# Patient Record
Sex: FEMALE | Race: WHITE | NOT HISPANIC OR LATINO | Employment: FULL TIME | URBAN - METROPOLITAN AREA
[De-identification: names, ages, dates, MRNs, and addresses within clinical notes are randomized per-mention and may not be internally consistent; named-entity substitution may affect disease eponyms.]

---

## 2017-03-27 ENCOUNTER — TRANSFERRED RECORDS (OUTPATIENT)
Dept: HEALTH INFORMATION MANAGEMENT | Facility: CLINIC | Age: 27
End: 2017-03-27

## 2017-03-27 LAB
CHOLEST SERPL-MCNC: 144 MG/DL (ref 100–199)
HBA1C MFR BLD: 5.7 % (ref 0–5.7)
HDLC SERPL-MCNC: 29 MG/DL
HPV ABSTRACT: NORMAL
LDLC SERPL CALC-MCNC: 90 MG/DL
NONHDLC SERPL-MCNC: 115 MG/DL
PAP-ABSTRACT: NORMAL
TRIGL SERPL-MCNC: 126 MG/DL

## 2018-02-21 ENCOUNTER — TELEPHONE (OUTPATIENT)
Dept: PEDIATRICS | Facility: CLINIC | Age: 28
End: 2018-02-21

## 2018-02-21 ENCOUNTER — HOSPITAL ENCOUNTER (EMERGENCY)
Facility: CLINIC | Age: 28
Discharge: HOME | End: 2018-02-22
Attending: EMERGENCY MEDICINE | Admitting: EMERGENCY MEDICINE
Payer: COMMERCIAL

## 2018-02-21 ENCOUNTER — APPOINTMENT (OUTPATIENT)
Dept: CT IMAGING | Facility: CLINIC | Age: 28
End: 2018-02-21
Attending: EMERGENCY MEDICINE
Payer: COMMERCIAL

## 2018-02-21 ENCOUNTER — APPOINTMENT (OUTPATIENT)
Dept: ULTRASOUND IMAGING | Facility: CLINIC | Age: 28
End: 2018-02-21
Attending: EMERGENCY MEDICINE
Payer: COMMERCIAL

## 2018-02-21 DIAGNOSIS — R10.31 ABDOMINAL PAIN, RIGHT LOWER QUADRANT: ICD-10-CM

## 2018-02-21 LAB
ALBUMIN SERPL-MCNC: 3.6 G/DL (ref 3.4–5)
ALBUMIN UR-MCNC: NEGATIVE MG/DL
ALP SERPL-CCNC: 77 U/L (ref 40–150)
ALT SERPL W P-5'-P-CCNC: 40 U/L (ref 0–50)
ANION GAP SERPL CALCULATED.3IONS-SCNC: 5 MMOL/L (ref 3–14)
APPEARANCE UR: ABNORMAL
APTT PPP: 28 SEC (ref 22–37)
AST SERPL W P-5'-P-CCNC: 19 U/L (ref 0–45)
BASOPHILS # BLD AUTO: 0.1 10E9/L (ref 0–0.2)
BASOPHILS NFR BLD AUTO: 0.5 %
BILIRUB SERPL-MCNC: 0.3 MG/DL (ref 0.2–1.3)
BILIRUB UR QL STRIP: NEGATIVE
BUN SERPL-MCNC: 8 MG/DL (ref 7–30)
CALCIUM SERPL-MCNC: 9.1 MG/DL (ref 8.5–10.1)
CHLORIDE SERPL-SCNC: 108 MMOL/L (ref 94–109)
CO2 SERPL-SCNC: 26 MMOL/L (ref 20–32)
COLOR UR AUTO: YELLOW
CREAT SERPL-MCNC: 0.8 MG/DL (ref 0.52–1.04)
DIFFERENTIAL METHOD BLD: NORMAL
EOSINOPHIL # BLD AUTO: 0.2 10E9/L (ref 0–0.7)
EOSINOPHIL NFR BLD AUTO: 1.8 %
ERYTHROCYTE [DISTWIDTH] IN BLOOD BY AUTOMATED COUNT: 13.8 % (ref 10–15)
GFR SERPL CREATININE-BSD FRML MDRD: 84 ML/MIN/1.7M2
GLUCOSE SERPL-MCNC: 97 MG/DL (ref 70–99)
GLUCOSE UR STRIP-MCNC: NEGATIVE MG/DL
HCG SERPL QL: NEGATIVE
HCT VFR BLD AUTO: 43.3 % (ref 35–47)
HGB BLD-MCNC: 13.9 G/DL (ref 11.7–15.7)
HGB UR QL STRIP: ABNORMAL
IMM GRANULOCYTES # BLD: 0.1 10E9/L (ref 0–0.4)
IMM GRANULOCYTES NFR BLD: 0.7 %
INR PPP: 1 (ref 0.86–1.14)
KETONES UR STRIP-MCNC: NEGATIVE MG/DL
LEUKOCYTE ESTERASE UR QL STRIP: ABNORMAL
LIPASE SERPL-CCNC: 151 U/L (ref 73–393)
LYMPHOCYTES # BLD AUTO: 2.9 10E9/L (ref 0.8–5.3)
LYMPHOCYTES NFR BLD AUTO: 26.5 %
MCH RBC QN AUTO: 28.4 PG (ref 26.5–33)
MCHC RBC AUTO-ENTMCNC: 32.1 G/DL (ref 31.5–36.5)
MCV RBC AUTO: 88 FL (ref 78–100)
MONOCYTES # BLD AUTO: 0.8 10E9/L (ref 0–1.3)
MONOCYTES NFR BLD AUTO: 7 %
MUCOUS THREADS #/AREA URNS LPF: PRESENT /LPF
NEUTROPHILS # BLD AUTO: 6.8 10E9/L (ref 1.6–8.3)
NEUTROPHILS NFR BLD AUTO: 63.5 %
NITRATE UR QL: NEGATIVE
NRBC # BLD AUTO: 0 10*3/UL
NRBC BLD AUTO-RTO: 0 /100
PH UR STRIP: 5 PH (ref 5–7)
PLATELET # BLD AUTO: 308 10E9/L (ref 150–450)
POTASSIUM SERPL-SCNC: 3.9 MMOL/L (ref 3.4–5.3)
PROT SERPL-MCNC: 8.3 G/DL (ref 6.8–8.8)
RBC # BLD AUTO: 4.9 10E12/L (ref 3.8–5.2)
RBC #/AREA URNS AUTO: 3 /HPF (ref 0–2)
SODIUM SERPL-SCNC: 139 MMOL/L (ref 133–144)
SOURCE: ABNORMAL
SP GR UR STRIP: 1.02 (ref 1–1.03)
SQUAMOUS #/AREA URNS AUTO: 5 /HPF (ref 0–1)
UROBILINOGEN UR STRIP-MCNC: 0 MG/DL (ref 0–2)
WBC # BLD AUTO: 10.8 10E9/L (ref 4–11)
WBC #/AREA URNS AUTO: 5 /HPF (ref 0–2)

## 2018-02-21 PROCEDURE — 84703 CHORIONIC GONADOTROPIN ASSAY: CPT | Performed by: EMERGENCY MEDICINE

## 2018-02-21 PROCEDURE — 80053 COMPREHEN METABOLIC PANEL: CPT | Performed by: EMERGENCY MEDICINE

## 2018-02-21 PROCEDURE — 25000128 H RX IP 250 OP 636: Performed by: EMERGENCY MEDICINE

## 2018-02-21 PROCEDURE — 81001 URINALYSIS AUTO W/SCOPE: CPT | Performed by: EMERGENCY MEDICINE

## 2018-02-21 PROCEDURE — 96374 THER/PROPH/DIAG INJ IV PUSH: CPT | Mod: 59

## 2018-02-21 PROCEDURE — 99285 EMERGENCY DEPT VISIT HI MDM: CPT | Mod: 25

## 2018-02-21 PROCEDURE — 85730 THROMBOPLASTIN TIME PARTIAL: CPT | Performed by: EMERGENCY MEDICINE

## 2018-02-21 PROCEDURE — 96375 TX/PRO/DX INJ NEW DRUG ADDON: CPT

## 2018-02-21 PROCEDURE — 74177 CT ABD & PELVIS W/CONTRAST: CPT

## 2018-02-21 PROCEDURE — 96361 HYDRATE IV INFUSION ADD-ON: CPT

## 2018-02-21 PROCEDURE — 85025 COMPLETE CBC W/AUTO DIFF WBC: CPT | Performed by: EMERGENCY MEDICINE

## 2018-02-21 PROCEDURE — 93976 VASCULAR STUDY: CPT

## 2018-02-21 PROCEDURE — 85610 PROTHROMBIN TIME: CPT | Performed by: EMERGENCY MEDICINE

## 2018-02-21 PROCEDURE — 96376 TX/PRO/DX INJ SAME DRUG ADON: CPT

## 2018-02-21 PROCEDURE — 83690 ASSAY OF LIPASE: CPT | Performed by: EMERGENCY MEDICINE

## 2018-02-21 RX ORDER — ONDANSETRON 2 MG/ML
4 INJECTION INTRAMUSCULAR; INTRAVENOUS EVERY 30 MIN PRN
Status: DISCONTINUED | OUTPATIENT
Start: 2018-02-21 | End: 2018-02-22 | Stop reason: HOSPADM

## 2018-02-21 RX ORDER — IOPAMIDOL 755 MG/ML
500 INJECTION, SOLUTION INTRAVASCULAR ONCE
Status: COMPLETED | OUTPATIENT
Start: 2018-02-21 | End: 2018-02-21

## 2018-02-21 RX ORDER — MORPHINE SULFATE 4 MG/ML
4 INJECTION, SOLUTION INTRAMUSCULAR; INTRAVENOUS
Status: DISCONTINUED | OUTPATIENT
Start: 2018-02-21 | End: 2018-02-22 | Stop reason: HOSPADM

## 2018-02-21 RX ADMIN — ONDANSETRON 4 MG: 2 INJECTION INTRAMUSCULAR; INTRAVENOUS at 21:53

## 2018-02-21 RX ADMIN — SODIUM CHLORIDE 1000 ML: 9 INJECTION, SOLUTION INTRAVENOUS at 22:36

## 2018-02-21 RX ADMIN — IOPAMIDOL 100 ML: 755 INJECTION, SOLUTION INTRAVENOUS at 22:21

## 2018-02-21 RX ADMIN — SODIUM CHLORIDE 65 ML: 9 INJECTION, SOLUTION INTRAVENOUS at 22:22

## 2018-02-21 RX ADMIN — ONDANSETRON 4 MG: 2 INJECTION INTRAMUSCULAR; INTRAVENOUS at 22:35

## 2018-02-21 RX ADMIN — MORPHINE SULFATE 4 MG: 4 INJECTION INTRAVENOUS at 21:55

## 2018-02-21 NOTE — TELEPHONE ENCOUNTER
"Patient called regarding     Lower right quadrant and lower middle quadrant abdominal pain 6-7/10. Pain described as cramping and constant.     Patient reports she was seen at [facility] last night. Was given pain medication, completed a urine test, and blood work. Patient reports she did not hear anything back regarding lab work.    Patient stated pain has intensified from last night. Feels she needs to be in a crunched position to feel relief of pain. Reports nausea. Denies vomiting. Reported chills and night sweats last night. Denies fever. Has tried IBU this morning. Pain was somewhat relieved but still present. Patient reports she has not had a BM for 2 days, \"unsualy for me\". Reports no passing of gas. Reports LMP this month.     Triage advised to be seen today in ER for further eval as patient reports pain intensified.  OV scheduled with resident cancelled.     Pt expressed understanding and acceptance of the plan.  Pt had no further questions at this time.  Advised can call back to clinic at any time with concerns.     Kandace BHANDARI RN, BSN, PHN  [place] [facility] RN    "

## 2018-02-21 NOTE — ED AVS SNAPSHOT
[facility] Emergency Department    [address]    [facility] [address]    Phone:  [phone]    Fax:  [phone]                                       Olga Sheehan   MRN: [id number]    Department:  [facility] Emergency Department   Date of Visit:  2/21/2018           After Visit Summary Signature Page     I have received my discharge instructions, and my questions have been answered. I have discussed any challenges I see with this plan with the nurse or doctor.    ..........................................................................................................................................  Patient/Patient Representative Signature      ..........................................................................................................................................  Patient Representative Print Name and Relationship to Patient    ..................................................               ................................................  Date                                            Time    ..........................................................................................................................................  Reviewed by Signature/Title    ...................................................              ..............................................  Date                                                            Time

## 2018-02-21 NOTE — ED AVS SNAPSHOT
[facility] Emergency Department    [address] [place] [address]    Phone:  [phone]    Fax:  [phone]                                       Olga Sheehan   MRN: [id number]    Department:  [facility] Emergency Department   Date of Visit:  2/21/2018           Patient Information     Date Of Birth          1990        Your diagnoses for this visit were:     Abdominal pain, right lower quadrant        You were seen by Lee Escalera MD.      Follow-up Information     Follow up with Yanni Ac. Schedule an appointment as soon as possible for a visit in 2 days.    Why:  For close follow up    Contact information:    [address] [facility]  [place] [address]  [phone]          Discharge Instructions       Discharge Instructions  Abdominal Pain    Abdominal pain (belly pain) can be caused by many things. Your evaluation today does not show the exact cause for your pain. Your provider today has decided that it is unlikely your pain is due to a life threatening problem, or a problem requiring surgery or hospital admission. Sometimes those problems cannot be found right away, so it is very important that you follow up as directed.  Sometimes only the changes which occur over time allow the cause of your pain to be found.    Generally, every Emergency Department visit should have a follow-up clinic visit with either a primary or a specialty clinic/provider. Please follow-up as instructed by your emergency provider today. With abdominal pain, we often recommend very close follow-up, such as the following day.    ADULTS:  Return to the Emergency Department right away if:      You get an oral temperature above 102oF or as directed by your provider.    You have blood in your stools. This may be bright red or appear as black, tarry stools.      You keep vomiting (throwing up) or cannot drink liquids.    You see blood when you vomit.     You cannot  have a bowel movement or you cannot pass gas.    Your stomach gets bloated or bigger.    Your skin or the whites of your eyes look yellow.    You faint.    You have bloody, frequent or painful urination (peeing).    You have new symptoms or anything that worries you.    CHILDREN:  Return to the Emergency Department right away if your child has any of the above-listed symptoms or the following:      Pushes your hand away or screams/cries when his/her belly is touched.    You notice your child is very fussy or weak.    Your child is very tired and is too tired to eat or drink.    Your child is dehydrated.  Signs of dehydration can be:  o Significant change in the amount of wet diapers/urine.  o Your infant or child starts to have dry mouth and lips, or no saliva (spit) or tears.    PREGNANT WOMEN:  Return to the Emergency Department right away if you have any of the above-listed symptoms or the following:      You have bleeding, leaking fluid or passing tissue from the vagina.    You have worse pain or cramping, or pain in your shoulder or back.    You have vomiting that will not stop.    You have a temperature of 100oF or more.    Your baby is not moving as much as usual.    You faint.    You get a bad headache with or without eye problems and abdominal pain.    You have a seizure.    You have unusual discharge from your vagina and abdominal pain.    Abdominal pain is pretty common during pregnancy.  Your pain may or may not be related to your pregnancy. You should follow-up closely with your OB provider so they can evaluate you and your baby.  Until you follow-up with your regular provider, do the following:       Avoid sex and do not put anything in your vagina.    Drink clear fluids.    Only take medications approved by your provider.    MORE INFORMATION:    Appendicitis:  A possible cause of abdominal pain in any person who still has their appendix is acute appendicitis. Appendicitis is often hard to diagnose.   "Testing does not always rule out early appendicitis or other causes of abdominal pain. Close follow-up with your provider and re-evaluations may be needed to figure out the reason for your abdominal pain.    Follow-up:  It is very important that you make an appointment with your clinic and go to the appointment.  If you do not follow-up with your primary provider, it may result in missing an important development which could result in permanent injury or disability and/or lasting pain.  If there is any problem keeping your appointment, call your provider or return to the Emergency Department.    Medications:  Take your medications as directed by your provider today.  Before using over-the-counter medications, ask your provider and make sure to take the medications as directed.  If you have any questions about medications, ask your provider.    Diet:  Resume your normal diet as much as possible, but do not eat fried, fatty or spicy foods while you have pain.  Do not drink alcohol or have caffeine.  Do not smoke tobacco.    Probiotics: If you have been given an antibiotic, you may want to also take a probiotic pill or eat yogurt with live cultures. Probiotics have \"good bacteria\" to help your intestines stay healthy. Studies have shown that probiotics help prevent diarrhea (loose stools) and other intestine problems (including C. diff infection) when you take antibiotics. You can buy these without a prescription in the pharmacy section of the store.     If you were given a prescription for medicine here today, be sure to read all of the information (including the package insert) that comes with your prescription.  This will include important information about the medicine, its side effects, and any warnings that you need to know about.  The pharmacist who fills the prescription can provide more information and answer questions you may have about the medicine.  If you have questions or concerns that the pharmacist " cannot address, please call or return to the Emergency Department.       Remember that you can always come back to the Emergency Department if you are not able to see your regular provider in the amount of time listed above, if you get any new symptoms, or if there is anything that worries you.      24 Hour Appointment Hotline       To make an appointment at any [facility], call [phone] ([phone]). If you don't have a family doctor or clinic, we will help you find one. [facility] are conveniently located to serve the needs of you and your family.             Review of your medicines      START taking        Dose / Directions Last dose taken    acetaminophen-codeine 300-30 MG per tablet   Commonly known as:  TYLENOL WITH CODEINE #3   Dose:  1-2 tablet   Quantity:  20 tablet        Take 1-2 tablets by mouth every 6 hours as needed for pain   Refills:  0        ibuprofen 600 MG tablet   Commonly known as:  ADVIL/MOTRIN   Dose:  600 mg   Quantity:  30 tablet        Take 1 tablet (600 mg) by mouth every 8 hours as needed for moderate pain   Refills:  0        polyethylene glycol powder   Commonly known as:  MIRALAX   Dose:  1 capful   Quantity:  510 g        Take 17 g (1 capful) by mouth daily Mix with 8 ounces of water or other liquid   Refills:  1          Our records show that you are taking the medicines listed below. If these are incorrect, please call your family doctor or clinic.        Dose / Directions Last dose taken    ALBUTEROL INHALER 17GM        None Entered   Refills:  0        ATIVAN PO        prn   Refills:  0        ondansetron 4 MG ODT tab   Commonly known as:  ZOFRAN ODT   Dose:  4 mg   Quantity:  10 tablet        Take 1 tablet by mouth every 6 hours as needed for nausea.   Refills:  0        ondansetron 4 MG tablet   Commonly known as:  ZOFRAN   Dose:  4 mg   Quantity:  6 tablet        Take 1 tablet by mouth every 8 hours as needed for nausea.   Refills:  0                 Prescriptions were sent or printed at these locations (3 Prescriptions)                   Other Prescriptions                Printed at Department/Unit printer (3 of 3)         polyethylene glycol (MIRALAX) powder               ibuprofen (ADVIL/MOTRIN) 600 MG tablet               acetaminophen-codeine (TYLENOL WITH CODEINE #3) 300-30 MG per tablet                Procedures and tests performed during your visit     CBC with platelets differential    CT Abdomen Pelvis w Contrast    Comprehensive metabolic panel    Give 20 ounces of water 15 minutes before CT of abdomen    HCG qualitative Blood    INR    Lipase    Partial thromboplastin time    Peripheral IV: Standard    UA reflex to Microscopic and Culture    US Pelvic Complete w Transvaginal & Abd/Pel Duplex Limited      Orders Needing Specimen Collection     None      Pending Results     Date and Time Order Name Status Description    2/21/2018 2236 US Pelvic Complete w Transvaginal & Abd/Pel Duplex Limited Preliminary             Pending Culture Results     No orders found for last 3 day(s).            Pending Results Instructions     If you had any lab results that were not finalized at the time of your Discharge, you can call the ED Lab Result RN at [phone]. You will be contacted by this team for any positive Lab results or changes in treatment. The nurses are available 7 days a week from 10A to 6:30P.  You can leave a message 24 hours per day and they will return your call.        Test Results From Your Hospital Stay        2/21/2018  9:49 PM      Component Results     Component Value Ref Range & Units Status    WBC 10.8 4.0 - 11.0 10e9/L Final    RBC Count 4.90 3.8 - 5.2 10e12/L Final    Hemoglobin 13.9 11.7 - 15.7 g/dL Final    Hematocrit 43.3 35.0 - 47.0 % Final    MCV 88 78 - 100 fl Final    MCH 28.4 26.5 - 33.0 pg Final    MCHC 32.1 31.5 - 36.5 g/dL Final    RDW 13.8 10.0 - 15.0 % Final    Platelet Count 308 150 - 450 10e9/L Final    Diff Method  Automated Method  Final    % Neutrophils 63.5 % Final    % Lymphocytes 26.5 % Final    % Monocytes 7.0 % Final    % Eosinophils 1.8 % Final    % Basophils 0.5 % Final    % Immature Granulocytes 0.7 % Final    Nucleated RBCs 0 0 /100 Final    Absolute Neutrophil 6.8 1.6 - 8.3 10e9/L Final    Absolute Lymphocytes 2.9 0.8 - 5.3 10e9/L Final    Absolute Monocytes 0.8 0.0 - 1.3 10e9/L Final    Absolute Eosinophils 0.2 0.0 - 0.7 10e9/L Final    Absolute Basophils 0.1 0.0 - 0.2 10e9/L Final    Abs Immature Granulocytes 0.1 0 - 0.4 10e9/L Final    Absolute Nucleated RBC 0.0  Final         2/21/2018 10:01 PM      Component Results     Component Value Ref Range & Units Status    INR 1.00 0.86 - 1.14 Final         2/21/2018 10:01 PM      Component Results     Component Value Ref Range & Units Status    PTT 28 22 - 37 sec Final         2/21/2018 10:07 PM      Component Results     Component Value Ref Range & Units Status    Sodium 139 133 - 144 mmol/L Final    Potassium 3.9 3.4 - 5.3 mmol/L Final    Chloride 108 94 - 109 mmol/L Final    Carbon Dioxide 26 20 - 32 mmol/L Final    Anion Gap 5 3 - 14 mmol/L Final    Glucose 97 70 - 99 mg/dL Final    Urea Nitrogen 8 7 - 30 mg/dL Final    Creatinine 0.80 0.52 - 1.04 mg/dL Final    GFR Estimate 84 >60 mL/min/1.7m2 Final    Non  GFR Calc    GFR Estimate If Black >90 >60 mL/min/1.7m2 Final    African American GFR Calc    Calcium 9.1 8.5 - 10.1 mg/dL Final    Bilirubin Total 0.3 0.2 - 1.3 mg/dL Final    Albumin 3.6 3.4 - 5.0 g/dL Final    Protein Total 8.3 6.8 - 8.8 g/dL Final    Alkaline Phosphatase 77 40 - 150 U/L Final    ALT 40 0 - 50 U/L Final    AST 19 0 - 45 U/L Final         2/21/2018 10:07 PM      Component Results     Component Value Ref Range & Units Status    Lipase 151 73 - 393 U/L Final         2/21/2018 10:12 PM      Component Results     Component Value Ref Range & Units Status    HCG Qualitative Serum Negative NEG^Negative Final    This test is for  screening purposes.  Results should be interpreted along with   the clinical picture.  Confirmation testing is available if warranted by   ordering [id number], HCG Quantitative Pregnancy.           2/21/2018 10:32 PM      Component Results     Component Value Ref Range & Units Status    Color Urine Yellow  Final    Appearance Urine Slightly Cloudy  Final    Glucose Urine Negative NEG^Negative mg/dL Final    Bilirubin Urine Negative NEG^Negative Final    Ketones Urine Negative NEG^Negative mg/dL Final    Specific Gravity Urine 1.018 1.003 - 1.035 Final    Blood Urine Small (A) NEG^Negative Final    pH Urine 5.0 5.0 - 7.0 pH Final    Protein Albumin Urine Negative NEG^Negative mg/dL Final    Urobilinogen mg/dL 0.0 0.0 - 2.0 mg/dL Final    Nitrite Urine Negative NEG^Negative Final    Leukocyte Esterase Urine Trace (A) NEG^Negative Final    Source Midstream Urine  Final    RBC Urine 3 (H) 0 - 2 /HPF Final    WBC Urine 5 (H) 0 - 2 /HPF Final    Squamous Epithelial /HPF Urine 5 (H) 0 - 1 /HPF Final    Mucous Urine Present (A) NEG^Negative /LPF Final         2/21/2018 10:31 PM      Narrative     CT ABDOMEN PELVIS W CONTRAST 2/21/2018 10:27 PM    HISTORY: Abdominal pain.    TECHNIQUE: CT of the abdomen and pelvis is performed with 100mL  Isovue-370 intravenously. Radiation dose for this scan was reduced  using automated exposure control, adjustment of the mA and/or kV  according to patient size, or iterative reconstruction technique.    COMPARISON: None.    FINDINGS:    Liver: There is diffuse fatty infiltration of the liver. Hepatomegaly.  Gallbladder: Cholelithiasis.  Pancreas:Normal.  Spleen:Normal.  Adrenals:Normal.  Ascites:None.    Kidneys:Normal.  Bladder:Normal.  Pelvic free fluid:None.    Bowels:Unremarkable.  No evidence of obstruction.  Appendix:Normal.    Abdominal or pelvic lymphadenopathy:None.    Miscellaneous findings:None.        Impression     IMPRESSION:    1. Hepatomegaly, with diffuse fatty infiltration of  the liver.  2. Cholelithiasis.  3. No evidence of appendicitis.    CHRISTIANA MONDRAGON MD         2/21/2018 11:56 PM      Narrative     PELVIC ULTRASOUND WITH ENDOVAGINAL TRANSDUCER   2/21/2018 11:48 PM     HISTORY: Right lower quadrant pain, evaluate for torsion or ovarian  pathology.     TECHNIQUE:  Endovaginal sonography was added to the transabdominal  exam to better evaluate the uterus and ovaries.    COMPARISON: None.    FINDINGS: The uterus is normal in size and position measuring 7.4 x  4.4 x 4.1 cm. The endometrium is normal in thickness at 1.0 cm.  Neither ovary is identified. No adnexal mass. No free pelvic fluid.        Impression     IMPRESSION: Normal uterus. Neither ovary is identified.                Clinical Quality Measure: Blood Pressure Screening     Your blood pressure was checked while you were in the emergency department today. The last reading we obtained was  BP: (!) 163/95 . Please read the guidelines below about what these numbers mean and what you should do about them.  If your systolic blood pressure (the top number) is less than 120 and your diastolic blood pressure (the bottom number) is less than 80, then your blood pressure is normal. There is nothing more that you need to do about it.  If your systolic blood pressure (the top number) is 120-139 or your diastolic blood pressure (the bottom number) is 80-89, your blood pressure may be higher than it should be. You should have your blood pressure rechecked within a year by a primary care provider.  If your systolic blood pressure (the top number) is 140 or greater or your diastolic blood pressure (the bottom number) is 90 or greater, you may have high blood pressure. High blood pressure is treatable, but if left untreated over time it can put you at risk for heart attack, stroke, or kidney failure. You should have your blood pressure rechecked by a primary care provider within the next 4 weeks.  If your provider in the emergency department  "today gave you specific instructions to follow-up with your doctor or provider even sooner than that, you should follow that instruction and not wait for up to 4 weeks for your follow-up visit.        Thank you for choosing [facility]       Thank you for choosing [facility] for your care. Our goal is always to provide you with excellent care. Hearing back from our patients is one way we can continue to improve our services. Please take a few minutes to complete the written survey that you may receive in the mail after you visit with us. Thank you!        [employer] Information     [employer] lets you send messages to your doctor, view your test results, renew your prescriptions, schedule appointments and more. To sign up, go to [email or web address] . Click on \"Log in\" on the left side of the screen, which will take you to the Welcome page. Then click on \"Sign up Now\" on the right side of the page.     You will be asked to enter the access code listed below, as well as some personal information. Please follow the directions to create your username and password.     Your access code is: [email or web address]  Expires: 2018 12:02 AM     Your access code will  in 90 days. If you need help or a new code, please call your [facility] clinic or [phone].        Care EveryWhere ID     This is your Care EveryWhere ID. This could be used by other organizations to access your [facility] medical records  [id number]        Equal Access to Services     BYRON MEDRANO : Hadii kadi Ruiz, waaxda zena, qaybta kaalmasylvester madsen, zaire singh . So [facility] [phone].    ATENCIÓN: Si habla español, tiene a tolbert disposición servicios gratuitos de asistencia lingüística. Lilaame al [phone].    We comply with applicable federal civil rights laws and [place] laws. We do not discriminate on the basis of race, color, national origin, age, disability, sex, sexual orientation, or gender identity.       "      After Visit Summary       This is your record. Keep this with you and show to your community pharmacist(s) and doctor(s) at your next visit.

## 2018-02-22 VITALS
SYSTOLIC BLOOD PRESSURE: 134 MMHG | DIASTOLIC BLOOD PRESSURE: 82 MMHG | OXYGEN SATURATION: 98 % | TEMPERATURE: 97.9 F | RESPIRATION RATE: 16 BRPM

## 2018-02-22 RX ORDER — IBUPROFEN 600 MG/1
600 TABLET, FILM COATED ORAL EVERY 8 HOURS PRN
Qty: 30 TABLET | Refills: 0 | Status: SHIPPED | OUTPATIENT
Start: 2018-02-22 | End: 2019-03-25

## 2018-02-22 RX ORDER — ACETAMINOPHEN AND CODEINE PHOSPHATE 300; 30 MG/1; MG/1
1-2 TABLET ORAL EVERY 6 HOURS PRN
Qty: 20 TABLET | Refills: 0 | Status: ON HOLD | OUTPATIENT
Start: 2018-02-22 | End: 2018-06-28

## 2018-02-22 RX ORDER — POLYETHYLENE GLYCOL 3350 17 G/17G
1 POWDER, FOR SOLUTION ORAL DAILY
Qty: 510 G | Refills: 1 | Status: SHIPPED | OUTPATIENT
Start: 2018-02-21 | End: 2019-09-10

## 2018-02-22 ASSESSMENT — ENCOUNTER SYMPTOMS
CONSTIPATION: 1
NAUSEA: 1
FEVER: 1
ABDOMINAL PAIN: 1
VOMITING: 1

## 2018-02-22 NOTE — ED PROVIDER NOTES
History     Chief Complaint:  Abdominal Pain     HPI   Olga Sheehan is a 28 year old female who presents with abdominal pain. The patient states that she had an onset of right lower quadrant abdominal pain yesterday which prompted her to visit the [facility]. There, they had performed an ultrasound as well as urine and blood labs. See below for more detail. She was told to follow up in the ED if she develops vomiting. This morning, the patient states that she had began to have emesis episodes this morning. In the afternoon, she notes that she has had menstrual type cramping in addition to the abdominal pain, but notes she is not currently on her menstrual period or having irregular bleeding. Here in the ED, the patient states that she has increased RLQ abdominal pain worse with movement. She additionally notes that she has had ongoing fever, highest of 101. She notes that she has been taking ibuprofen. She also notes decrease in bowel movements. Denies diarrhea, BRBPR, melena. She denies urinary symptoms. Pain does not radiate to her back or flank. She denies pelvic pain or vaginal symptoms.     Ultrasound conclusion from [facility] 2/20/18:  1. Normal gallbladder. No bile duct dilatation.  2. Diffuse hepatic steatosis.    Laboratory studies from [facility] 2/20/2018:  BASIC METABOLIC PANEL (02/20/2018 6:15 PM)  Component Value   SODIUM 145   POTASSIUM 3.6   CHLORIDE 107   CO2,TOTAL 24   ANION GAP 14 (H)   GLUCOSE,RANDOM 125 (H)   CALCIUM 9.1   BUN 9   CREATININE 0.80   BUN/CREAT RATIO 11   GFR if  >60   GFR if not  >60     HEPATIC FUNCTION PANEL (02/20/2018 6:15 PM)  Component Value   ALBUMIN 4.6   PROTEIN,TOTAL 8.4 (H)   GLOBULIN 3.8 (H)   A/G RATIO 1.2 (L)   BILIRUBIN,TOTAL 0.4   ALK PHOSPHATASE 75   ALT (SGPT) 42   AST (SGOT) 25     CBC WITH AUTO DIFFERENTIAL (02/20/2018 6:15 PM)  Component Value   WHITE BLOOD COUNT  10.8 (H)   RED BLOOD COUNT  5.01   HEMOGLOBIN  14.1    HEMATOCRIT  43.4   MCV  87   MCH  28.1   MCHC  32.5   RDW  13.4   PLATELET COUNT  282   MPV  8.9   NEUTROPHILS  66.5   LYMPHOCYTES  25.5   MONOCYTES  5.3   EOSINOPHILS  1.8   BASOPHILS  0.9   ABSOLUTE NEUTROPHILS  7.2 (H)   ABSOLUTE LYMPHOCYTES  2.7   ABSOLUTE MONOCYTES  0.6   ABSOLUTE EOSINOPHILS  0.2       Allergies:  Ceclor [Cefaclor]  Penicillins      Medications:    Zofran  Ativan  Albuterol    Past Medical History:    Asthma    Past Surgical History:    The patient does not have any pertinent past surgical history.     Family History:    No past pertinent family history.     Social History:  Presents with her significant other.   Current Every Day Smoker   Positive for alcohol use.    Marital Status:  Single [1]     Review of Systems   Constitutional: Positive for fever.   Gastrointestinal: Positive for abdominal pain, constipation, nausea and vomiting.   Genitourinary: Negative for vaginal bleeding.   All other systems reviewed and are negative.      Physical Exam   First Vitals:  BP: (!) 163/95  Heart Rate: 95  Temp: 97.9  F (36.6  C)  Resp: 16  SpO2: 100 %    Physical Exam  General: Well appearing, nontoxic. Resting comfortably  Head:  Scalp, face, and head appear normal  Eyes:  Pupils are equal, round    Conjunctivae non-injected and sclerae white  ENT:    The external nose is normal    Pinnae are normal    The oropharynx is normal, mucous membranes moist    Uvula is in the midline  Neck:  Normal range of motion    There is no rigidity noted    Trachea is in the midline  CV:  Regular rate and rhythm     Normal S1/S2, no S3/S4    No murmur or rub  Resp:  Lungs are clear and equal bilaterally    There is no tachypnea    No increased work of breathing    No rales, wheezing, or rhonchi  GI:  Abdomen is soft, obese, no rigidity or guarding    No distension, or mass    Significant RLQ tenderness. + Rebound tenderness. + Rosvings sign. No CVA tenderness   MS:  Normal muscular tone    Symmetric motor  strength    No lower extremity edema  Skin:  No rash or acute skin lesions noted  Neuro:  Awake and alert    Speech is normal and fluent    Moves all extremities spontaneously  Psych: Normal affect.  Appropriate interactions.    Emergency Department Course     Imaging:  Radiographic findings were communicated with the patient who voiced understanding of the findings.  CT Abdomen/Pelvis with IV contrast:   1. Hepatomegaly, with diffuse fatty infiltration of the liver.  2. Cholelithiasis.  3. No evidence of appendicitis. As per radiology.    US Pelvic Complete, with Transvaginal and Abdomen/Pelvis Duplex Limited:  Normal uterus. Neither ovary is identified. As per radiology.     Laboratory:  CBC: WBC: 10.8, HGB: 13.9, PLT: 308  CMP: all WNL (Creatinine: 0.80)    INR: 1.0  Partial Thromboplastin: 28  Lipase: 151  HCG qualitative: negative   UA with micro: small blood, trace leukocyte esterase, mucous present, RBC: 3 (H), WBC: 5 (H), squamous epithelial: 5 (H)  o/w negative     Interventions:   2153 Zofran, 4 mg, IV  2155 Morphine, 4 mg, IV  2235 Zofran, 4 mg, IV  2236 0.9% Sodium Chloride Bolus, 1L, IV      Emergency Department Course:  Nursing notes and vitals reviewed.     2118  I performed an exam of the patient as documented above.     IV inserted. Medicine administered as documented above. Blood drawn. This was sent to the lab for further testing, results above. The patient provided a urine sample here in the emergency department. This was sent for laboratory testing, findings above.      The patient was sent for a rib and chest XR while in the emergency department, findings above.     2238 I rechecked the patient and discussed the results of her workup thus far.     Findings and plan explained to the Patient. Patient discharged home with instructions regarding supportive care, medications, and reasons to return. The importance of close follow-up was reviewed. The patient was prescribed Miralax, Tylenol,  ibuprofen.     I personally reviewed the laboratory results with the Patient and answered all related questions prior to discharge.       Impression & Plan      Medical Decision Making:  Olga Sheehan is a 28 year old female who presents with worsening abdominal pain and was previously seen at the [facility] where she had a negative right upper quadrant US and unremarkable laboratory studies as noted above. Her pain has worsened and localized to the right lower quadrant. The differential diagnosis is broad and includes appendicitis, intraabdominal abscess, colitis, bowel obstruction, volvulus. There was no evidence of cholelithiasis or cholecystitis on her US results reviewed from [facility]. She does have evidence of hepatic steatosis, but I feel it is unlikely to cause her current symptoms. She also notes decrease in bowel movements. Constipation is on differential as well. Also considered was pelvic or gynecologic causes for her pain. Work up in the emergency department revealed normal laboratory studies including lipase which makes pancreatitis less likely. CT of the abdomen and pelvis was unremarkable. Appendix was unremarkable. There is no evidence of any other acute, serious etiology for her symptoms. Transvaginal and Pelvic US was then obtained and revealed no acute findings although the exam was limited due to the patient's body habitus and the ovaries were not definitely seen. On reevaluation multiple times during her ED course, her symptoms significantly improved after the above interventions. Clinically, I feel that acute ovarian torsion is less likely although I did discuss with the patient that we could not definitively rule this out given the difficulty visualizing the ovaries. Despite this and given that her symptoms are essentially resolved, I feel it is safe to have the patient follow up as an out patient. She has an appointment to see her PCP tomorrow. I will provide the patient with  Miralax, Tylenol #3, as well as ibuprofen to use as needed for pain. Return precautions were discussed with patient. The patient's questions were answered and the patient was agreeable with discharge. Patient was discharged in improved condition.       Diagnosis:    ICD-10-CM   1. Abdominal pain, right lower quadrant R10.31       Disposition:  discharged to home    Discharge Medications:   Details   polyethylene glycol (MIRALAX) powder Take 17 g (1 capful) by mouth daily Mix with 8 ounces of water or other liquid, Disp-510 g, R-1, Local Print      ibuprofen (ADVIL/MOTRIN) 600 MG tablet Take 1 tablet (600 mg) by mouth every 8 hours as needed for moderate pain, Disp-30 tablet, R-0, Local Print      acetaminophen-codeine (TYLENOL WITH CODEINE #3) 300-30 MG per tablet Take 1-2 tablets by mouth every 6 hours as needed for pain, Disp-20 tablet, R-0, Local Print     IDenita, am serving as a scribe on 2/21/2018 at 9:18 PM to personally document services performed by Lee Escalera MD based on my observations and the provider's statements to me.      Denita Ryder  2/21/2018   [facility] EMERGENCY DEPARTMENT       Lee Escalera MD  02/22/18 [id number]

## 2018-02-22 NOTE — ED NOTES
Middle abdomen pain that started yesterday. Seen at the [facility]; told to follow up if fever, chills or vomiting. No BM in two days. Not passing gas. Lower abdominal cramping.

## 2018-03-01 PROBLEM — J45.20 INTERMITTENT ASTHMA: Status: ACTIVE | Noted: 2018-03-01

## 2018-03-01 PROBLEM — J45.909 ASTHMA: Status: ACTIVE | Noted: 2018-03-01

## 2018-03-01 PROBLEM — E66.9 OBESITY: Status: ACTIVE | Noted: 2018-03-01

## 2018-06-27 ENCOUNTER — HOSPITAL ENCOUNTER (EMERGENCY)
Facility: CLINIC | Age: 28
Discharge: HOME | End: 2018-06-27
Attending: EMERGENCY MEDICINE | Admitting: EMERGENCY MEDICINE
Payer: COMMERCIAL

## 2018-06-27 VITALS
WEIGHT: 293 LBS | RESPIRATION RATE: 20 BRPM | BODY MASS INDEX: 44.41 KG/M2 | OXYGEN SATURATION: 100 % | HEART RATE: 119 BPM | TEMPERATURE: 98.7 F | SYSTOLIC BLOOD PRESSURE: 160 MMHG | HEIGHT: 68 IN | DIASTOLIC BLOOD PRESSURE: 104 MMHG

## 2018-06-27 DIAGNOSIS — K61.1 PERIRECTAL ABSCESS: ICD-10-CM

## 2018-06-27 PROCEDURE — 76857 US EXAM PELVIC LIMITED: CPT

## 2018-06-27 PROCEDURE — 46050 I&D PERIANAL ABSCESS SUPFC: CPT

## 2018-06-27 PROCEDURE — 99284 EMERGENCY DEPT VISIT MOD MDM: CPT | Mod: 25

## 2018-06-27 PROCEDURE — 25000132 ZZH RX MED GY IP 250 OP 250 PS 637: Performed by: EMERGENCY MEDICINE

## 2018-06-27 PROCEDURE — 25000128 H RX IP 250 OP 636: Performed by: EMERGENCY MEDICINE

## 2018-06-27 RX ORDER — DOXYCYCLINE 100 MG/1
100 CAPSULE ORAL 2 TIMES DAILY
Qty: 14 CAPSULE | Refills: 0 | Status: SHIPPED | OUTPATIENT
Start: 2018-06-27 | End: 2018-07-04

## 2018-06-27 RX ORDER — OXYCODONE HYDROCHLORIDE 5 MG/1
10 TABLET ORAL ONCE
Status: COMPLETED | OUTPATIENT
Start: 2018-06-27 | End: 2018-06-27

## 2018-06-27 RX ORDER — OXYCODONE HYDROCHLORIDE 5 MG/1
5-10 TABLET ORAL EVERY 6 HOURS PRN
Qty: 15 TABLET | Refills: 0 | Status: SHIPPED | OUTPATIENT
Start: 2018-06-27 | End: 2018-11-07

## 2018-06-27 RX ORDER — BUPIVACAINE HYDROCHLORIDE 5 MG/ML
INJECTION, SOLUTION PERINEURAL
Status: DISCONTINUED
Start: 2018-06-27 | End: 2018-06-27 | Stop reason: HOSPADM

## 2018-06-27 RX ADMIN — OXYCODONE HYDROCHLORIDE 10 MG: 5 TABLET ORAL at 14:48

## 2018-06-27 RX ADMIN — MIDAZOLAM 10 MG: 5 INJECTION INTRAMUSCULAR; INTRAVENOUS at 13:51

## 2018-06-27 ASSESSMENT — ENCOUNTER SYMPTOMS: DIARRHEA: 1

## 2018-06-27 NOTE — ED AVS SNAPSHOT
[facility]    [address] [place] [address]    Phone:  [phone]    Fax:  [phone]                                       Olga Sheehan   MRN: [id number]    Department:  [facility]   Date of Visit:  6/27/2018           Patient Information     Date Of Birth          1990        Your diagnoses for this visit were:     Perirectal abscess        You were seen by Adams Billings MD.        Discharge Instructions       Please make an appointment to follow up with [facility] [phone] in 2-3 days even if entirely better.      Discharge Instructions  Boils or Abscesses, MRSA Skin infections    You have been treated today for a skin boil or abscess. A boil is an infection under the skin that causes a painful pus filled lump. Boils start when bacteria infect a hair follicle, the place where a hair starts to grow.  The most common places that boils develop are on the face, neck, armpits, breasts, groin and buttocks. When they are small they can often be treated at home, but they can grow quickly, become very painful, and require medical attention.    Many of these infections are staph infections. Staph is a type of bacteria that commonly lives on skin. As many as 1 out of 3 people have staph that lives on their skin. Usually, it causes no problems, but if there is a cut or scrape, it can cause an infection. You have been treated today for an infection thought to be caused by MRSA, (pronounced  mursa ) which stands for methicillin resistant staph aureus. MRSA can be very difficult to treat. The antibiotics that were once used for skin infections do not work on MRSA, so alternative medications must be prescribed.    Return to the Emergency Department if:    Your redness, pain, or swelling gets a lot worse.    You are unable to get your antibiotics, or are vomiting them up or you can t take them.    You are  feeling more ill, weak or lightheaded.    You start to run a new fever (temperature >101).    Anything else about the infection worries or concerns you.  Treatment:    Incision and drainage (opening the boil with a scalpel to help the pus drain) or needle aspiration (removing pus with a syringe) is sometimes needed for larger abscesses. A wick or packing is sometimes put in the wound to encourage ongoing drainage of infection from the area.  Please leave it in place for as long as instructed by your care provider or until your follow up wound check in 48 hours.    Start your antibiotics right away, and take them as prescribed. Be sure to finish the whole prescription, even if you are better.    Apply a heating pad, warm packs, or warm water soaks to the infected area for 15 minutes at a time, at least 3 times a day. Do not use a heating pad on your feet or legs if you have diabetes. Do not sleep with a heating pad on, since this can cause burns or skin injury.    Raise the affected area above the level of your heart as much as possible in the first 1-2 days.    Pain medication - You may take a pain medication such as Tylenol  (acetaminophen), Advil  (ibuprofen), Nuprin  (ibuprofen) or Aleve  (naproxen).  If you have been given a narcotic such as Vicodin  (hydrocodone with acetaminophen), Percocet  (oxycodone with acetaminophen), or codeine, do not drive for four hours after you have taken it. If the narcotic contains Tylenol  (acetaminophen), do not take Tylenol  with it. All narcotics will cause constipation, so eat a high fiber diet.              Information about MRSA    How do you catch MRSA?    By touching a person who has MRSA on his or her skin.    By being nearby when a person with MRSA breathes, coughs, or sneezes.    By touching a table, handle or other surface that has the germ on it.    If the germ is on your skin and you cut yourself or have another injury, you can get infected.    How do I know if I  "have a MRSA infection? MRSA most commonly causes skin infections such as boils, red tender lumps that contain pus. Your physician may recognize MRSA from the appearance of your infection. Sometimes, your doctor may swab your skin or the drainage from a boil to test for MRSA or other bacteria.    Can MRSA be treated? Yes, certain medications are still effective in treating MRSA infections.  It is very important that you follow the directions exactly. Take ALL the pills you are given, even if you feel better before you finish the pills. If you do not take them all, the germ could come back even stronger and be harder to treat next time.  Is there any way to prevent MRSA?     Wash your hands frequently with soap and water.    Do not share towels, washcloths, razors or other personal care items.    Wipe down gym equipment before and after you use it.    If you develop a similar infection in the future, you can try to treat it at home:    Apply warm compresses to the affected area to promote drainage.    Wash hands frequently to prevent spread of infection.    Keep affected area covered to prevent spread of infection.    Never squeeze or pop boils.     When to seek medical attention for boils:    You develop a fever.    The area around the boil becomes red or red streaks develop.    Your pain becomes severe.    You develop swollen lymph nodes.    You have diabetes, a heart murmur, an immune disease like HIV or AIDS, you take corticosteroids for a medical condition, or you are on chemotherapy.    You develop a boil or abscess on your face, near your spine or near your rectal opening.  Probiotics: If you have been given an antibiotic, you may want to also take a probiotic pill or eat yogurt with live cultures. Probiotics have \"good bacteria\" to help your intestines stay healthy. Studies have shown that probiotics help prevent diarrhea and other intestine problems (including C. diff infection) when you take antibiotics. You " can buy these without a prescription in the pharmacy section of the store.   If you were given a prescription for medicine here today, be sure to read all of the information (including the package insert) that comes with your prescription.  This will include important information about the medicine, its side effects, and any warnings that you need to know about.  The pharmacist who fills the prescription can provide more information and answer questions you may have about the medicine.  If you have questions or concerns that the pharmacist cannot address, please call or return to the Emergency Department.   Opioid Medication Information    Pain medications are among the most commonly prescribed medicines, so we are including this information for all our patients. If you did not receive pain medication or get a prescription for pain medicine, you can ignore it.     You may have been given a prescription for an opioid (narcotic) pain medicine and/or have received a pain medicine while here in the Emergency Department. These medicines can make you drowsy or impaired. You must not drive, operate dangerous equipment, or engage in any other dangerous activities while taking these medications. If you drive while taking these medications, you could be arrested for DUI, or driving under the influence. Do not drink any alcohol while you are taking these medications.     Opioid pain medications can cause addiction. If you have a history of chemical dependency of any type, you are at a higher risk of becoming addicted to pain medications.  Only take these prescribed medications to treat your pain when all other options have been tried. Take it for as short a time and as few doses as possible. Store your pain pills in a secure place, as they are frequently stolen and provide a dangerous opportunity for children or visitors in your house to start abusing these powerful medications. We will not replace any lost or stolen medicine.   As soon as your pain is better, you should flush all your remaining medication.     Many prescription pain medications contain Tylenol  (acetaminophen), including Vicodin , Tylenol #3 , Norco , Lortab , and Percocet .  You should not take any extra pills of Tylenol  if you are using these prescription medications or you can get very sick.  Do not ever take more than 3000 mg of acetaminophen in any 24 hour period.    All opioids tend to cause constipation. Drink plenty of water and eat foods that have a lot of fiber, such as fruits, vegetables, prune juice, apple juice and high fiber cereal.  Take a laxative if you don t move your bowels at least every other day. Miralax , Milk of Magnesia, Colace , or Senna  can be used to keep you regular.      Remember that you can always come back to the Emergency Department if you are not able to see your regular doctor in the amount of time listed above, if you get any new symptoms, or if there is anything that worries you.        Perianal Abscess, Incision and Drainage  Glands near the anus can become blocked. This can lead to infection. If the infection cannot drain, a collection of pus called an abscess may form. Symptoms of an abscess include pain, itching, swelling, and fever.  Treatment of this infection has required an incision to drain the pus from the abscess. A gauze packing may have been put into the abscess opening. This should be removed within 1-2 days. if it falls out sooner, do not try to put it back in. Treatment with antibiotics may or may not be needed.  Healing of the wound will take about 1 to 2 weeks, depending on the size of the abscess.  Home care    The wound may drain for the first several days. Cover the wound with a clean dry bandage. Change the dressing if it becomes soaked with blood or pus, or soiled with feces.    If gauze packing was placed inside the abscess cavity, you may be told to remove it yourself. Do this only do it if the doctor told  you to. You may do this in the shower. Once the packing is removed, wash the area carefully once a day until the skin opening has closed.     Try sitz baths. Sit in a tub filled with about 6 inches of hot water for 15-30 minutes. (Test the water temperature before sitting down to ensure it will not burn you.) Repeat this twice a day until pain is relieved.    If you were prescribed antibiotics, take all of the medicine as prescribed. Continue it even if you start feeling better. All of the medicine should be finished unless your healthcare provider tells you to stop.    Unless a pain medicine has been prescribed, you may take an over-the-counter medicine, such as ibuprofen, for pain.    Passing stools may be painful. If so, ask your healthcare provider about using a stool-softener for a short time.  Follow-up care  Follow up with your healthcare provider as advised by our staff.  When to seek medical advice  Call your health care provider if any of the following occur:    Increasing pain, swelling, or redness    Pus continuing to drain from the wound 5 days after the incision    Fever of 100.4 F (38 C) or higher, or as directed by your healthcare provider  Date Last Reviewed: 6/22/2015 2000-2017 [employer]. [address] [place] [address]. All rights reserved. This information is not intended as a substitute for professional medical care. Always follow your healthcare professional's instructions.          24 Hour Appointment Hotline       To make an appointment at any [facility], call [phone] ([phone]). If you don't have a family doctor or clinic, we will help you find one. [facility] clinics are conveniently located to serve the needs of you and your family.             Review of your medicines      START taking        Dose / Directions Last dose taken    doxycycline 100 MG capsule   Commonly known as:  VIBRAMYCIN   Dose:  100 mg   Quantity:  14 capsule        Take 1  capsule (100 mg) by mouth 2 times daily for 7 days   Refills:  0        oxyCODONE IR 5 MG tablet   Commonly known as:  ROXICODONE   Dose:  5-10 mg   Quantity:  15 tablet        Take 1-2 tablets (5-10 mg) by mouth every 6 hours as needed for pain   Refills:  0          Our records show that you are taking the medicines listed below. If these are incorrect, please call your family doctor or clinic.        Dose / Directions Last dose taken    acetaminophen-codeine 300-30 MG per tablet   Commonly known as:  TYLENOL WITH CODEINE #3   Dose:  1-2 tablet   Quantity:  20 tablet        Take 1-2 tablets by mouth every 6 hours as needed for pain   Refills:  0        ALBUTEROL INHALER 17GM        None Entered   Refills:  0        ATIVAN PO        prn   Refills:  0        ibuprofen 600 MG tablet   Commonly known as:  ADVIL/MOTRIN   Dose:  600 mg   Quantity:  30 tablet        Take 1 tablet (600 mg) by mouth every 8 hours as needed for moderate pain   Refills:  0        ondansetron 4 MG ODT tab   Commonly known as:  ZOFRAN ODT   Dose:  4 mg   Quantity:  10 tablet        Take 1 tablet by mouth every 6 hours as needed for nausea.   Refills:  0        ondansetron 4 MG tablet   Commonly known as:  ZOFRAN   Dose:  4 mg   Quantity:  6 tablet        Take 1 tablet by mouth every 8 hours as needed for nausea.   Refills:  0        polyethylene glycol powder   Commonly known as:  MIRALAX   Dose:  1 capful   Quantity:  510 g        Take 17 g (1 capful) by mouth daily Mix with 8 ounces of water or other liquid   Refills:  1                Information about OPIOIDS     PRESCRIPTION OPIOIDS: WHAT YOU NEED TO KNOW   We gave you an opioid (narcotic) pain medicine. It is important to manage your pain, but opioids are not always the best choice. You should first try all the other options your care team gave you. Take this medicine for as short a time (and as few doses) as possible.     These medicines have risks:    DO NOT drive when on new or higher  doses of pain medicine. These medicines can affect your alertness and reaction times, and you could be arrested for driving under the influence (DUI). If you need to use opioids long-term, talk to your care team about driving.    DO NOT operate heave machinery    DO NOT do any other dangerous activities while taking these medicines.     DO NOT drink any alcohol while taking these medicines.      If the opioid prescribed includes acetaminophen, DO NOT take with any other medicines that contain acetaminophen. Read all labels carefully. Look for the word  acetaminophen  or  Tylenol.  Ask your pharmacist if you have questions or are unsure.    You can get addicted to pain medicines, especially if you have a history of addiction (chemical, alcohol or substance dependence). Talk to your care team about ways to reduce this risk.    Store your pills in a secure place, locked if possible. We will not replace any lost or stolen medicine. If you don t finish your medicine, please throw away (dispose) as directed by your pharmacist. The [place] [employer]ution [employer] Agency has more information about safe disposal: https://www.pca.[facility].[place]/living-green/managing-unwanted-medications.     All opioids tend to cause constipation. Drink plenty of water and eat foods that have a lot of fiber, such as fruits, vegetables, prune juice, apple juice and high-fiber cereal. Take a laxative (Miralax, milk of magnesia, Colace, Senna) if you don t move your bowels at least every other day.         Prescriptions were sent or printed at these locations (2 Prescriptions)                   Other Prescriptions                Printed at Department/Unit printer (2 of 2)         doxycycline (VIBRAMYCIN) 100 MG capsule               oxyCODONE IR (ROXICODONE) 5 MG tablet                Procedures and tests performed during your visit     POC US SOFT TISSUE    Pulse oximetry nursing    Suction      Orders Needing Specimen Collection     None      Pending  Results     Date and Time Order Name Status Description    6/27/2018 1323 POC US SOFT TISSUE In process             Pending Culture Results     No orders found from 6/25/2018 to 6/28/2018.            Pending Results Instructions     If you had any lab results that were not finalized at the time of your Discharge, you can call the ED Lab Result RN at [phone]. You will be contacted by this team for any positive Lab results or changes in treatment. The nurses are available 7 days a week from 10A to 6:30P.  You can leave a message 24 hours per day and they will return your call.        Test Results From Your Hospital Stay        6/27/2018  1:23 PM      Result not yet available     Exam Begun                Clinical Quality Measure: Blood Pressure Screening     Your blood pressure was checked while you were in the emergency department today. The last reading we obtained was  BP: (!) 171/93 . Please read the guidelines below about what these numbers mean and what you should do about them.  If your systolic blood pressure (the top number) is less than 120 and your diastolic blood pressure (the bottom number) is less than 80, then your blood pressure is normal. There is nothing more that you need to do about it.  If your systolic blood pressure (the top number) is 120-139 or your diastolic blood pressure (the bottom number) is 80-89, your blood pressure may be higher than it should be. You should have your blood pressure rechecked within a year by a primary care provider.  If your systolic blood pressure (the top number) is 140 or greater or your diastolic blood pressure (the bottom number) is 90 or greater, you may have high blood pressure. High blood pressure is treatable, but if left untreated over time it can put you at risk for heart attack, stroke, or kidney failure. You should have your blood pressure rechecked by a primary care provider within the next 4 weeks.  If your provider in the emergency department  "today gave you specific instructions to follow-up with your doctor or provider even sooner than that, you should follow that instruction and not wait for up to 4 weeks for your follow-up visit.        Thank you for choosing [facility]       Thank you for choosing [facility] for your care. Our goal is always to provide you with excellent care. Hearing back from our patients is one way we can continue to improve our services. Please take a few minutes to complete the written survey that you may receive in the mail after you visit with us. Thank you!        [employer] Information     [employer] lets you send messages to your doctor, view your test results, renew your prescriptions, schedule appointments and more. To sign up, go to [email or web address] . Click on \"Log in\" on the left side of the screen, which will take you to the Welcome page. Then click on \"Sign up Now\" on the right side of the page.     You will be asked to enter the access code listed below, as well as some personal information. Please follow the directions to create your username and password.     Your access code is: [email or web address]  Expires: 2018  3:15 PM     Your access code will  in 90 days. If you need help or a new code, please call your [facility] clinic or [phone].        Care EveryWhere ID     This is your Care EveryWhere ID. This could be used by other organizations to access your [facility] medical records  [id number]        Equal Access to Services     BYRON MEDRANO : Hadii kadi Ruiz, waaxda lususan, qaybta kaalmada cale, zaire singh . [facility] [phone].    ATENCIÓN: Si habla español, tiene a tolbert disposición servicios gratuitos de asistencia lingüística. Rafael carrion [phone].    We comply with applicable federal civil rights laws and [place] laws. We do not discriminate on the basis of race, color, national origin, age, disability, sex, sexual orientation, or gender identity.       "      After Visit Summary       This is your record. Keep this with you and show to your community pharmacist(s) and doctor(s) at your next visit.

## 2018-06-27 NOTE — ED NOTES
A/O. VSS.. Pt verbalized understanding of d/c instructions and ambulated to lobby steady gait.   Script rx adoxycycline and oxycodone given to pt at time of d/c

## 2018-06-27 NOTE — ED PROVIDER NOTES
"  History     Chief Complaint:  Rectal Pain      HPI   Olga Sheehan is a 28 year old female who presents with rectal pain. She states having intense diarrhea four days ago. The next day she began having internal and external rectal pain that has progressively worsened. She noted the pain is off centered and more on one side. The pain is constant and non-radiating. The patient denies any bloody discharge. She took laxatives as to not strain the area. She stated the pain woke her up from sleep at 0300 this morning and she is having difficulty sleeping due to it.     Allergies:  Ceclor   Penicillins     Medications:    Acetaminophen  Albuterol  Ativan  Ibuprofen  Zofran     Past Medical History:    Asthma  Fatty liver   Obesity   Depressed bipolar disorder  PTSD    Past Surgical History:    The patient does not have any pertinent past surgical history.    Family History:    The patient denies any relevant family medical history.    Social History:  Smoking Status: Daily  Smokeless Tobacco: No  Alcohol Use: Yes  Marital Status:  Single [1]      Review of Systems   Gastrointestinal: Positive for diarrhea.   All other systems reviewed and are negative.    Physical Exam   Vitals:  Patient Vitals for the past 24 hrs:   BP Temp Temp src Pulse Resp SpO2 Height Weight   06/27/18 1558 - - - - 20 - - -   06/27/18 1450 - - - - - 100 % - -   06/27/18 1449 - - - - - 98 % - -   06/27/18 1448 (!) 160/104 - - - - - - -   06/27/18 1400 (!) 171/93 - - - - 96 % - -   06/27/18 1350 161/75 - - - - 98 % - -   06/27/18 1345 - - - - - 99 % - -   06/27/18 1330 - - - - - 98 % - -   06/27/18 1140 162/85 98.7  F (37.1  C) Oral 119 18 99 % 1.727 m (5' 8\") (!) 163.3 kg (360 lb)         Physical Exam   Genitourinary:             Constitutional:  Pleasant, age appropriate female lying on her right side in obvious discomfort.  HEENT:    Oropharynx is moist  Eyes:    Conjunctiva normal  Neck:    Supple, no meningismus.     CV:     Regular rate and " rhythm.      No murmurs, rubs or gallops.  PULM:    Clear to auscultation bilateral.       No respiratory distress.      Good air exchange.  ABD:    Soft, non-distended.       No abdominal tenderness.     Bowel sounds normal.     No pulsatile masses.       No rebound, guarding or rigidity.     No CVA tenderness.   MSK:     No gross deformity to all four extremities.   LYMPH:   No cervical lymphadenopathy.  NEURO:   Alert.  Good muscular tone, no atrophy.   Skin:    Warm, dry and intact.    Psych:    Mood is good and affect is appropriate.      Emergency Department Course   Imaging:  Radiology findings were communicated with the patient who voiced understanding of the findings.  POC US Soft Tissue   FINDINGS: Cobblestoning of soft tissue: absent  Hypoechoic fluid (ie abscess) identified: present measuring 2 x 2 cm    INTERPRETATION:  The soft tissue and muscle layers were evaluated.     Findings indicate abscess    Final result by Adams Billings MD      Procedures:      Procedure: Incision and Drainage   LOCATIONS:  Perirectal abscess     ANESTHESIA:  Local field block using Marcaine 0.5% plain, total of 9 mLs     PREPARATION:  Cleansed with Betadine     PROCEDURE:  Area was incised with # 11 Blade ([place]) with a Single Straight incision.  Wound treatment included Deloculation, Purulent Drainage and Expression of Material.  Packing consisted of No Packing.  Appropriate dressing was applied to cover the area.    Patient Status:        Patient tolerated the procedure well. There were no complications.          .  Interventions:  1351 Versed 10 mg IV  1448 Oxycodone 10 oral     Emergency Department Course:  Nursing notes and vitals reviewed.  I performed an exam of the patient as documented above.     1340 I checked in with the patient.    1401 I rechecked with the patient.     The patient was sent for a POC US soft tissue while in the emergency department, results above.     I personally answered all  related questions prior to discharge.    Findings and plan explained to the Patient. Patient discharged home with instructions regarding supportive care, medications, and reasons to return. The importance of close follow-up was reviewed.   Impression & Plan      Medical Decision Makin-year-old female presented to the ED with progressively worsening perirectal pain.  Patient found to have a perirectal abscess which is confirmed by bedside ultrasound.  Patient underwent an incision and drainage with significant purulent drainage.  Patient is safe for discharge home with ongoing analgesics and antibiotics to cover for Staph and Strep including MRSA.  General wound care instructions given.  Close follow with primary care physician in 2-3 days and return to ED for any worsening symptoms..     Diagnosis:    ICD-10-CM    1. Perirectal abscess K61.1        Disposition:   Discharged.     CMS Diagnoses: None     Discharge Medications:    Discharge Medication List as of 2018  3:15 PM      START taking these medications    Details   doxycycline (VIBRAMYCIN) 100 MG capsule Take 1 capsule (100 mg) by mouth 2 times daily for 7 days, Disp-14 capsule, R-0, Local Print      oxyCODONE IR (ROXICODONE) 5 MG tablet Take 1-2 tablets (5-10 mg) by mouth every 6 hours as needed for pain, Disp-15 tablet, R-0, Local Print           Scribe Disclosure:  I, Yvonne Powers, am serving as a scribe at 1:12 PM on 2018 to document services personally performed by Adams Billings MD, based on my observations and the provider's statements to me.  [facility] EMERGENCY DEPARTMENT       Adams Billings MD  [phone]

## 2018-06-27 NOTE — ED TRIAGE NOTES
Pt c/o pain in rectum Saturday night, getting worse. Pt c/o a lump on her rectum- states it has gotten bigger since Saturday.

## 2018-06-27 NOTE — ED AVS SNAPSHOT
[facility] Emergency Department    [address]    [facility] [address]    Phone:  [phone]    Fax:  [phone]                                       Olga Sheehan   MRN: [id number]    Department:  [facility] Emergency Department   Date of Visit:  6/27/2018           After Visit Summary Signature Page     I have received my discharge instructions, and my questions have been answered. I have discussed any challenges I see with this plan with the nurse or doctor.    ..........................................................................................................................................  Patient/Patient Representative Signature      ..........................................................................................................................................  Patient Representative Print Name and Relationship to Patient    ..................................................               ................................................  Date                                            Time    ..........................................................................................................................................  Reviewed by Signature/Title    ...................................................              ..............................................  Date                                                            Time

## 2018-06-27 NOTE — DISCHARGE INSTRUCTIONS
Please make an appointment to follow up with [facility] [phone] in 2-3 days even if entirely better.      Discharge Instructions  Boils or Abscesses, MRSA Skin infections    You have been treated today for a skin boil or abscess. A boil is an infection under the skin that causes a painful pus filled lump. Boils start when bacteria infect a hair follicle, the place where a hair starts to grow.  The most common places that boils develop are on the face, neck, armpits, breasts, groin and buttocks. When they are small they can often be treated at home, but they can grow quickly, become very painful, and require medical attention.    Many of these infections are staph infections. Staph is a type of bacteria that commonly lives on skin. As many as 1 out of 3 people have staph that lives on their skin. Usually, it causes no problems, but if there is a cut or scrape, it can cause an infection. You have been treated today for an infection thought to be caused by MRSA, (pronounced  mursa ) which stands for methicillin resistant staph aureus. MRSA can be very difficult to treat. The antibiotics that were once used for skin infections do not work on MRSA, so alternative medications must be prescribed.    Return to the Emergency Department if:    Your redness, pain, or swelling gets a lot worse.    You are unable to get your antibiotics, or are vomiting them up or you can t take them.    You are feeling more ill, weak or lightheaded.    You start to run a new fever (temperature >101).    Anything else about the infection worries or concerns you.  Treatment:    Incision and drainage (opening the boil with a scalpel to help the pus drain) or needle aspiration (removing pus with a syringe) is sometimes needed for larger abscesses. A wick or packing is sometimes put in the wound to encourage ongoing drainage of infection from the area.  Please leave it in place for as long as instructed by your care provider or  until your follow up wound check in 48 hours.    Start your antibiotics right away, and take them as prescribed. Be sure to finish the whole prescription, even if you are better.    Apply a heating pad, warm packs, or warm water soaks to the infected area for 15 minutes at a time, at least 3 times a day. Do not use a heating pad on your feet or legs if you have diabetes. Do not sleep with a heating pad on, since this can cause burns or skin injury.    Raise the affected area above the level of your heart as much as possible in the first 1-2 days.    Pain medication - You may take a pain medication such as Tylenol  (acetaminophen), Advil  (ibuprofen), Nuprin  (ibuprofen) or Aleve  (naproxen).  If you have been given a narcotic such as Vicodin  (hydrocodone with acetaminophen), Percocet  (oxycodone with acetaminophen), or codeine, do not drive for four hours after you have taken it. If the narcotic contains Tylenol  (acetaminophen), do not take Tylenol  with it. All narcotics will cause constipation, so eat a high fiber diet.              Information about MRSA    How do you catch MRSA?    By touching a person who has MRSA on his or her skin.    By being nearby when a person with MRSA breathes, coughs, or sneezes.    By touching a table, handle or other surface that has the germ on it.    If the germ is on your skin and you cut yourself or have another injury, you can get infected.    How do I know if I have a MRSA infection? MRSA most commonly causes skin infections such as boils, red tender lumps that contain pus. Your physician may recognize MRSA from the appearance of your infection. Sometimes, your doctor may swab your skin or the drainage from a boil to test for MRSA or other bacteria.    Can MRSA be treated? Yes, certain medications are still effective in treating MRSA infections.  It is very important that you follow the directions exactly. Take ALL the pills you are given, even if you feel better before you  "finish the pills. If you do not take them all, the germ could come back even stronger and be harder to treat next time.  Is there any way to prevent MRSA?     Wash your hands frequently with soap and water.    Do not share towels, washcloths, razors or other personal care items.    Wipe down gym equipment before and after you use it.    If you develop a similar infection in the future, you can try to treat it at home:    Apply warm compresses to the affected area to promote drainage.    Wash hands frequently to prevent spread of infection.    Keep affected area covered to prevent spread of infection.    Never squeeze or pop boils.     When to seek medical attention for boils:    You develop a fever.    The area around the boil becomes red or red streaks develop.    Your pain becomes severe.    You develop swollen lymph nodes.    You have diabetes, a heart murmur, an immune disease like HIV or AIDS, you take corticosteroids for a medical condition, or you are on chemotherapy.    You develop a boil or abscess on your face, near your spine or near your rectal opening.  Probiotics: If you have been given an antibiotic, you may want to also take a probiotic pill or eat yogurt with live cultures. Probiotics have \"good bacteria\" to help your intestines stay healthy. Studies have shown that probiotics help prevent diarrhea and other intestine problems (including C. diff infection) when you take antibiotics. You can buy these without a prescription in the pharmacy section of the store.   If you were given a prescription for medicine here today, be sure to read all of the information (including the package insert) that comes with your prescription.  This will include important information about the medicine, its side effects, and any warnings that you need to know about.  The pharmacist who fills the prescription can provide more information and answer questions you may have about the medicine.  If you have questions or " concerns that the pharmacist cannot address, please call or return to the Emergency Department.   Opioid Medication Information    Pain medications are among the most commonly prescribed medicines, so we are including this information for all our patients. If you did not receive pain medication or get a prescription for pain medicine, you can ignore it.     You may have been given a prescription for an opioid (narcotic) pain medicine and/or have received a pain medicine while here in the Emergency Department. These medicines can make you drowsy or impaired. You must not drive, operate dangerous equipment, or engage in any other dangerous activities while taking these medications. If you drive while taking these medications, you could be arrested for DUI, or driving under the influence. Do not drink any alcohol while you are taking these medications.     Opioid pain medications can cause addiction. If you have a history of chemical dependency of any type, you are at a higher risk of becoming addicted to pain medications.  Only take these prescribed medications to treat your pain when all other options have been tried. Take it for as short a time and as few doses as possible. Store your pain pills in a secure place, as they are frequently stolen and provide a dangerous opportunity for children or visitors in your house to start abusing these powerful medications. We will not replace any lost or stolen medicine.  As soon as your pain is better, you should flush all your remaining medication.     Many prescription pain medications contain Tylenol  (acetaminophen), including Vicodin , Tylenol #3 , Norco , Lortab , and Percocet .  You should not take any extra pills of Tylenol  if you are using these prescription medications or you can get very sick.  Do not ever take more than 3000 mg of acetaminophen in any 24 hour period.    All opioids tend to cause constipation. Drink plenty of water and eat foods that have a lot of  fiber, such as fruits, vegetables, prune juice, apple juice and high fiber cereal.  Take a laxative if you don t move your bowels at least every other day. Miralax , Milk of Magnesia, Colace , or Senna  can be used to keep you regular.      Remember that you can always come back to the Emergency Department if you are not able to see your regular doctor in the amount of time listed above, if you get any new symptoms, or if there is anything that worries you.        Perianal Abscess, Incision and Drainage  Glands near the anus can become blocked. This can lead to infection. If the infection cannot drain, a collection of pus called an abscess may form. Symptoms of an abscess include pain, itching, swelling, and fever.  Treatment of this infection has required an incision to drain the pus from the abscess. A gauze packing may have been put into the abscess opening. This should be removed within 1-2 days. if it falls out sooner, do not try to put it back in. Treatment with antibiotics may or may not be needed.  Healing of the wound will take about 1 to 2 weeks, depending on the size of the abscess.  Home care    The wound may drain for the first several days. Cover the wound with a clean dry bandage. Change the dressing if it becomes soaked with blood or pus, or soiled with feces.    If gauze packing was placed inside the abscess cavity, you may be told to remove it yourself. Do this only do it if the doctor told you to. You may do this in the shower. Once the packing is removed, wash the area carefully once a day until the skin opening has closed.     Try sitz baths. Sit in a tub filled with about 6 inches of hot water for 15-30 minutes. (Test the water temperature before sitting down to ensure it will not burn you.) Repeat this twice a day until pain is relieved.    If you were prescribed antibiotics, take all of the medicine as prescribed. Continue it even if you start feeling better. All of the medicine should be  finished unless your healthcare provider tells you to stop.    Unless a pain medicine has been prescribed, you may take an over-the-counter medicine, such as ibuprofen, for pain.    Passing stools may be painful. If so, ask your healthcare provider about using a stool-softener for a short time.  Follow-up care  Follow up with your healthcare provider as advised by our staff.  When to seek medical advice  Call your health care provider if any of the following occur:    Increasing pain, swelling, or redness    Pus continuing to drain from the wound 5 days after the incision    Fever of 100.4 F (38 C) or higher, or as directed by your healthcare provider  Date Last Reviewed: 6/22/2015 2000-2017 [employer]. [address] [place] [address], [place] [address]. All rights reserved. This information is not intended as a substitute for professional medical care. Always follow your healthcare professional's instructions.

## 2018-06-28 ENCOUNTER — HOSPITAL ENCOUNTER (OUTPATIENT)
Facility: CLINIC | Age: 28
Discharge: HOME | End: 2018-06-28
Attending: EMERGENCY MEDICINE | Admitting: COLON & RECTAL SURGERY
Payer: COMMERCIAL

## 2018-06-28 ENCOUNTER — NURSE TRIAGE (OUTPATIENT)
Dept: NURSING | Facility: CLINIC | Age: 28
End: 2018-06-28

## 2018-06-28 ENCOUNTER — ANESTHESIA (OUTPATIENT)
Dept: SURGERY | Facility: CLINIC | Age: 28
End: 2018-06-28
Payer: COMMERCIAL

## 2018-06-28 ENCOUNTER — ANESTHESIA EVENT (OUTPATIENT)
Dept: SURGERY | Facility: CLINIC | Age: 28
End: 2018-06-28
Payer: COMMERCIAL

## 2018-06-28 ENCOUNTER — APPOINTMENT (OUTPATIENT)
Dept: CT IMAGING | Facility: CLINIC | Age: 28
End: 2018-06-28
Attending: EMERGENCY MEDICINE
Payer: COMMERCIAL

## 2018-06-28 VITALS
DIASTOLIC BLOOD PRESSURE: 92 MMHG | RESPIRATION RATE: 12 BRPM | OXYGEN SATURATION: 95 % | BODY MASS INDEX: 45.99 KG/M2 | HEIGHT: 67 IN | WEIGHT: 293 LBS | SYSTOLIC BLOOD PRESSURE: 141 MMHG | TEMPERATURE: 99.9 F

## 2018-06-28 DIAGNOSIS — K61.0 PERIANAL ABSCESS: ICD-10-CM

## 2018-06-28 LAB
ANION GAP SERPL CALCULATED.3IONS-SCNC: 6 MMOL/L (ref 3–14)
B-HCG FREE SERPL-ACNC: <5 IU/L
BASOPHILS # BLD AUTO: 0 10E9/L (ref 0–0.2)
BASOPHILS NFR BLD AUTO: 0.4 %
BUN SERPL-MCNC: 6 MG/DL (ref 7–30)
CALCIUM SERPL-MCNC: 8.5 MG/DL (ref 8.5–10.1)
CHLORIDE SERPL-SCNC: 105 MMOL/L (ref 94–109)
CO2 SERPL-SCNC: 26 MMOL/L (ref 20–32)
CREAT SERPL-MCNC: 0.77 MG/DL (ref 0.52–1.04)
DIFFERENTIAL METHOD BLD: ABNORMAL
EOSINOPHIL # BLD AUTO: 0.1 10E9/L (ref 0–0.7)
EOSINOPHIL NFR BLD AUTO: 0.8 %
ERYTHROCYTE [DISTWIDTH] IN BLOOD BY AUTOMATED COUNT: 14.5 % (ref 10–15)
GFR SERPL CREATININE-BSD FRML MDRD: 88 ML/MIN/1.7M2
GLUCOSE SERPL-MCNC: 101 MG/DL (ref 70–99)
HCT VFR BLD AUTO: 41.1 % (ref 35–47)
HGB BLD-MCNC: 13 G/DL (ref 11.7–15.7)
IMM GRANULOCYTES # BLD: 0.1 10E9/L (ref 0–0.4)
IMM GRANULOCYTES NFR BLD: 1.2 %
LYMPHOCYTES # BLD AUTO: 1.3 10E9/L (ref 0.8–5.3)
LYMPHOCYTES NFR BLD AUTO: 12.2 %
MCH RBC QN AUTO: 28.2 PG (ref 26.5–33)
MCHC RBC AUTO-ENTMCNC: 31.6 G/DL (ref 31.5–36.5)
MCV RBC AUTO: 89 FL (ref 78–100)
MONOCYTES # BLD AUTO: 0.8 10E9/L (ref 0–1.3)
MONOCYTES NFR BLD AUTO: 7.3 %
NEUTROPHILS # BLD AUTO: 8.4 10E9/L (ref 1.6–8.3)
NEUTROPHILS NFR BLD AUTO: 78.1 %
NRBC # BLD AUTO: 0 10*3/UL
NRBC BLD AUTO-RTO: 0 /100
PLATELET # BLD AUTO: 247 10E9/L (ref 150–450)
POTASSIUM SERPL-SCNC: 3.7 MMOL/L (ref 3.4–5.3)
RBC # BLD AUTO: 4.61 10E12/L (ref 3.8–5.2)
SODIUM SERPL-SCNC: 137 MMOL/L (ref 133–144)
WBC # BLD AUTO: 10.7 10E9/L (ref 4–11)

## 2018-06-28 PROCEDURE — 25000125 ZZHC RX 250: Performed by: NURSE ANESTHETIST, CERTIFIED REGISTERED

## 2018-06-28 PROCEDURE — 25000128 H RX IP 250 OP 636: Performed by: ANESTHESIOLOGY

## 2018-06-28 PROCEDURE — 25000128 H RX IP 250 OP 636: Performed by: NURSE ANESTHETIST, CERTIFIED REGISTERED

## 2018-06-28 PROCEDURE — 93005 ELECTROCARDIOGRAM TRACING: CPT | Mod: 59

## 2018-06-28 PROCEDURE — 25000128 H RX IP 250 OP 636: Performed by: COLON & RECTAL SURGERY

## 2018-06-28 PROCEDURE — 25000128 H RX IP 250 OP 636: Performed by: EMERGENCY MEDICINE

## 2018-06-28 PROCEDURE — 71000012 ZZH RECOVERY PHASE 1 LEVEL 1 FIRST HR: Performed by: COLON & RECTAL SURGERY

## 2018-06-28 PROCEDURE — 40000305 ZZH STATISTIC PRE PROC ASSESS I: Performed by: COLON & RECTAL SURGERY

## 2018-06-28 PROCEDURE — 84702 CHORIONIC GONADOTROPIN TEST: CPT

## 2018-06-28 PROCEDURE — 27210995 ZZH RX 272: Performed by: COLON & RECTAL SURGERY

## 2018-06-28 PROCEDURE — 36000052 ZZH SURGERY LEVEL 2 EA 15 ADDTL MIN: Performed by: COLON & RECTAL SURGERY

## 2018-06-28 PROCEDURE — 25000132 ZZH RX MED GY IP 250 OP 250 PS 637: Performed by: COLON & RECTAL SURGERY

## 2018-06-28 PROCEDURE — 96376 TX/PRO/DX INJ SAME DRUG ADON: CPT

## 2018-06-28 PROCEDURE — 25000125 ZZHC RX 250: Performed by: COLON & RECTAL SURGERY

## 2018-06-28 PROCEDURE — 27210794 ZZH OR GENERAL SUPPLY STERILE: Performed by: COLON & RECTAL SURGERY

## 2018-06-28 PROCEDURE — 71000027 ZZH RECOVERY PHASE 2 EACH 15 MINS: Performed by: COLON & RECTAL SURGERY

## 2018-06-28 PROCEDURE — 96361 HYDRATE IV INFUSION ADD-ON: CPT

## 2018-06-28 PROCEDURE — 25000566 ZZH SEVOFLURANE, EA 15 MIN: Performed by: COLON & RECTAL SURGERY

## 2018-06-28 PROCEDURE — 72193 CT PELVIS W/DYE: CPT

## 2018-06-28 PROCEDURE — 96374 THER/PROPH/DIAG INJ IV PUSH: CPT | Mod: 59

## 2018-06-28 PROCEDURE — 96375 TX/PRO/DX INJ NEW DRUG ADDON: CPT | Mod: 59

## 2018-06-28 PROCEDURE — 99285 EMERGENCY DEPT VISIT HI MDM: CPT | Mod: 25

## 2018-06-28 PROCEDURE — 37000009 ZZH ANESTHESIA TECHNICAL FEE, EACH ADDTL 15 MIN: Performed by: COLON & RECTAL SURGERY

## 2018-06-28 PROCEDURE — 85025 COMPLETE CBC W/AUTO DIFF WBC: CPT | Performed by: EMERGENCY MEDICINE

## 2018-06-28 PROCEDURE — 36000050 ZZH SURGERY LEVEL 2 1ST 30 MIN: Performed by: COLON & RECTAL SURGERY

## 2018-06-28 PROCEDURE — 37000008 ZZH ANESTHESIA TECHNICAL FEE, 1ST 30 MIN: Performed by: COLON & RECTAL SURGERY

## 2018-06-28 PROCEDURE — 80048 BASIC METABOLIC PNL TOTAL CA: CPT | Performed by: EMERGENCY MEDICINE

## 2018-06-28 RX ORDER — ONDANSETRON 4 MG/1
4 TABLET, ORALLY DISINTEGRATING ORAL EVERY 30 MIN PRN
Status: DISCONTINUED | OUTPATIENT
Start: 2018-06-28 | End: 2018-06-28 | Stop reason: HOSPADM

## 2018-06-28 RX ORDER — FENTANYL CITRATE 50 UG/ML
25-50 INJECTION, SOLUTION INTRAMUSCULAR; INTRAVENOUS
Status: DISCONTINUED | OUTPATIENT
Start: 2018-06-28 | End: 2018-06-28 | Stop reason: HOSPADM

## 2018-06-28 RX ORDER — LABETALOL HYDROCHLORIDE 5 MG/ML
10 INJECTION, SOLUTION INTRAVENOUS
Status: DISCONTINUED | OUTPATIENT
Start: 2018-06-28 | End: 2018-06-28 | Stop reason: HOSPADM

## 2018-06-28 RX ORDER — PROPOFOL 10 MG/ML
INJECTION, EMULSION INTRAVENOUS PRN
Status: DISCONTINUED | OUTPATIENT
Start: 2018-06-28 | End: 2018-06-28

## 2018-06-28 RX ORDER — OXYCODONE HYDROCHLORIDE 5 MG/1
5 TABLET ORAL EVERY 6 HOURS PRN
Qty: 12 TABLET | Refills: 0 | Status: SHIPPED | OUTPATIENT
Start: 2018-06-28 | End: 2018-11-07

## 2018-06-28 RX ORDER — SODIUM CHLORIDE, SODIUM LACTATE, POTASSIUM CHLORIDE, CALCIUM CHLORIDE 600; 310; 30; 20 MG/100ML; MG/100ML; MG/100ML; MG/100ML
INJECTION, SOLUTION INTRAVENOUS CONTINUOUS
Status: DISCONTINUED | OUTPATIENT
Start: 2018-06-28 | End: 2018-06-28 | Stop reason: HOSPADM

## 2018-06-28 RX ORDER — ONDANSETRON 4 MG/1
4 TABLET, ORALLY DISINTEGRATING ORAL EVERY 8 HOURS PRN
Qty: 10 TABLET | Refills: 0 | Status: SHIPPED | OUTPATIENT
Start: 2018-06-28 | End: 2018-07-01

## 2018-06-28 RX ORDER — MEPERIDINE HYDROCHLORIDE 25 MG/ML
12.5 INJECTION INTRAMUSCULAR; INTRAVENOUS; SUBCUTANEOUS
Status: DISCONTINUED | OUTPATIENT
Start: 2018-06-28 | End: 2018-06-28 | Stop reason: HOSPADM

## 2018-06-28 RX ORDER — DEXAMETHASONE SODIUM PHOSPHATE 4 MG/ML
INJECTION, SOLUTION INTRA-ARTICULAR; INTRALESIONAL; INTRAMUSCULAR; INTRAVENOUS; SOFT TISSUE PRN
Status: DISCONTINUED | OUTPATIENT
Start: 2018-06-28 | End: 2018-06-28

## 2018-06-28 RX ORDER — BUPIVACAINE HYDROCHLORIDE 5 MG/ML
INJECTION, SOLUTION EPIDURAL; INTRACAUDAL PRN
Status: DISCONTINUED | OUTPATIENT
Start: 2018-06-28 | End: 2018-06-28 | Stop reason: HOSPADM

## 2018-06-28 RX ORDER — HYDROMORPHONE HYDROCHLORIDE 1 MG/ML
.3-.5 INJECTION, SOLUTION INTRAMUSCULAR; INTRAVENOUS; SUBCUTANEOUS EVERY 10 MIN PRN
Status: DISCONTINUED | OUTPATIENT
Start: 2018-06-28 | End: 2018-06-28 | Stop reason: HOSPADM

## 2018-06-28 RX ORDER — FENTANYL CITRATE 50 UG/ML
INJECTION, SOLUTION INTRAMUSCULAR; INTRAVENOUS PRN
Status: DISCONTINUED | OUTPATIENT
Start: 2018-06-28 | End: 2018-06-28

## 2018-06-28 RX ORDER — GLYCOPYRROLATE 0.2 MG/ML
INJECTION, SOLUTION INTRAMUSCULAR; INTRAVENOUS PRN
Status: DISCONTINUED | OUTPATIENT
Start: 2018-06-28 | End: 2018-06-28

## 2018-06-28 RX ORDER — OXYCODONE HYDROCHLORIDE 5 MG/1
5-10 TABLET ORAL
Qty: 13 TABLET | Refills: 0 | Status: SHIPPED | OUTPATIENT
Start: 2018-06-28 | End: 2018-11-07

## 2018-06-28 RX ORDER — OXYCODONE HYDROCHLORIDE 5 MG/1
5 TABLET ORAL
Status: COMPLETED | OUTPATIENT
Start: 2018-06-28 | End: 2018-06-28

## 2018-06-28 RX ORDER — IOPAMIDOL 755 MG/ML
500 INJECTION, SOLUTION INTRAVASCULAR ONCE
Status: COMPLETED | OUTPATIENT
Start: 2018-06-28 | End: 2018-06-28

## 2018-06-28 RX ORDER — HYDRALAZINE HYDROCHLORIDE 20 MG/ML
2.5-5 INJECTION INTRAMUSCULAR; INTRAVENOUS EVERY 10 MIN PRN
Status: DISCONTINUED | OUTPATIENT
Start: 2018-06-28 | End: 2018-06-28 | Stop reason: HOSPADM

## 2018-06-28 RX ORDER — ONDANSETRON 2 MG/ML
4 INJECTION INTRAMUSCULAR; INTRAVENOUS EVERY 30 MIN PRN
Status: COMPLETED | OUTPATIENT
Start: 2018-06-28 | End: 2018-06-28

## 2018-06-28 RX ORDER — ACETAMINOPHEN 325 MG/1
975 TABLET ORAL ONCE
Status: DISCONTINUED | OUTPATIENT
Start: 2018-06-28 | End: 2018-06-28 | Stop reason: HOSPADM

## 2018-06-28 RX ORDER — LIDOCAINE 40 MG/G
CREAM TOPICAL
Status: DISCONTINUED | OUTPATIENT
Start: 2018-06-28 | End: 2018-06-28 | Stop reason: HOSPADM

## 2018-06-28 RX ORDER — CIPROFLOXACIN 2 MG/ML
400 INJECTION, SOLUTION INTRAVENOUS
Status: COMPLETED | OUTPATIENT
Start: 2018-06-28 | End: 2018-06-28

## 2018-06-28 RX ORDER — LIDOCAINE HYDROCHLORIDE AND EPINEPHRINE 10; 10 MG/ML; UG/ML
INJECTION, SOLUTION INFILTRATION; PERINEURAL
Status: DISCONTINUED
Start: 2018-06-28 | End: 2018-06-28 | Stop reason: HOSPADM

## 2018-06-28 RX ORDER — ONDANSETRON 2 MG/ML
4 INJECTION INTRAMUSCULAR; INTRAVENOUS EVERY 30 MIN PRN
Status: DISCONTINUED | OUTPATIENT
Start: 2018-06-28 | End: 2018-06-28 | Stop reason: HOSPADM

## 2018-06-28 RX ORDER — MAGNESIUM HYDROXIDE 1200 MG/15ML
LIQUID ORAL PRN
Status: DISCONTINUED | OUTPATIENT
Start: 2018-06-28 | End: 2018-06-28 | Stop reason: HOSPADM

## 2018-06-28 RX ORDER — SODIUM CHLORIDE 9 MG/ML
1000 INJECTION, SOLUTION INTRAVENOUS CONTINUOUS
Status: DISCONTINUED | OUTPATIENT
Start: 2018-06-28 | End: 2018-06-28 | Stop reason: HOSPADM

## 2018-06-28 RX ORDER — LIDOCAINE HYDROCHLORIDE 10 MG/ML
INJECTION, SOLUTION INFILTRATION; PERINEURAL PRN
Status: DISCONTINUED | OUTPATIENT
Start: 2018-06-28 | End: 2018-06-28

## 2018-06-28 RX ORDER — NALOXONE HYDROCHLORIDE 0.4 MG/ML
.1-.4 INJECTION, SOLUTION INTRAMUSCULAR; INTRAVENOUS; SUBCUTANEOUS
Status: DISCONTINUED | OUTPATIENT
Start: 2018-06-28 | End: 2018-06-28 | Stop reason: HOSPADM

## 2018-06-28 RX ORDER — CIPROFLOXACIN 2 MG/ML
400 INJECTION, SOLUTION INTRAVENOUS SEE ADMIN INSTRUCTIONS
Status: DISCONTINUED | OUTPATIENT
Start: 2018-06-28 | End: 2018-06-28 | Stop reason: HOSPADM

## 2018-06-28 RX ADMIN — HYDROMORPHONE HYDROCHLORIDE 1 MG: 1 INJECTION, SOLUTION INTRAMUSCULAR; INTRAVENOUS; SUBCUTANEOUS at 14:30

## 2018-06-28 RX ADMIN — CIPROFLOXACIN 400 MG: 2 INJECTION, SOLUTION INTRAVENOUS at 16:25

## 2018-06-28 RX ADMIN — ONDANSETRON 4 MG: 2 INJECTION, SOLUTION INTRAMUSCULAR; INTRAVENOUS at 16:03

## 2018-06-28 RX ADMIN — MIDAZOLAM 2 MG: 1 INJECTION INTRAMUSCULAR; INTRAVENOUS at 16:19

## 2018-06-28 RX ADMIN — DEXAMETHASONE SODIUM PHOSPHATE 4 MG: 4 INJECTION, SOLUTION INTRA-ARTICULAR; INTRALESIONAL; INTRAMUSCULAR; INTRAVENOUS; SOFT TISSUE at 16:25

## 2018-06-28 RX ADMIN — HYDROMORPHONE HYDROCHLORIDE 1 MG: 1 INJECTION, SOLUTION INTRAMUSCULAR; INTRAVENOUS; SUBCUTANEOUS at 13:08

## 2018-06-28 RX ADMIN — GLYCOPYRROLATE 0.2 MG: 0.2 INJECTION, SOLUTION INTRAMUSCULAR; INTRAVENOUS at 16:25

## 2018-06-28 RX ADMIN — GLYCOPYRROLATE 0.2 MG: 0.2 INJECTION, SOLUTION INTRAMUSCULAR; INTRAVENOUS at 16:21

## 2018-06-28 RX ADMIN — LIDOCAINE HYDROCHLORIDE 40 MG: 10 INJECTION, SOLUTION INFILTRATION; PERINEURAL at 16:25

## 2018-06-28 RX ADMIN — PROPOFOL 200 MG: 10 INJECTION, EMULSION INTRAVENOUS at 16:25

## 2018-06-28 RX ADMIN — ONDANSETRON 4 MG: 2 INJECTION, SOLUTION INTRAMUSCULAR; INTRAVENOUS at 18:42

## 2018-06-28 RX ADMIN — IOPAMIDOL 100 ML: 755 INJECTION, SOLUTION INTRAVENOUS at 13:25

## 2018-06-28 RX ADMIN — FENTANYL CITRATE 50 MCG: 50 INJECTION INTRAMUSCULAR; INTRAVENOUS at 17:49

## 2018-06-28 RX ADMIN — Medication 0.5 MG: at 17:35

## 2018-06-28 RX ADMIN — ROCURONIUM BROMIDE 20 MG: 10 INJECTION INTRAVENOUS at 16:33

## 2018-06-28 RX ADMIN — ROCURONIUM BROMIDE 10 MG: 10 INJECTION INTRAVENOUS at 16:25

## 2018-06-28 RX ADMIN — FENTANYL CITRATE 50 MCG: 50 INJECTION INTRAMUSCULAR; INTRAVENOUS at 17:12

## 2018-06-28 RX ADMIN — HYDROMORPHONE HYDROCHLORIDE 1 MG: 1 INJECTION, SOLUTION INTRAMUSCULAR; INTRAVENOUS; SUBCUTANEOUS at 12:32

## 2018-06-28 RX ADMIN — ONDANSETRON 4 MG: 2 INJECTION, SOLUTION INTRAMUSCULAR; INTRAVENOUS at 13:08

## 2018-06-28 RX ADMIN — SODIUM CHLORIDE 1000 ML: 9 INJECTION, SOLUTION INTRAVENOUS at 12:32

## 2018-06-28 RX ADMIN — Medication 100 MG: at 16:25

## 2018-06-28 RX ADMIN — OXYCODONE HYDROCHLORIDE 5 MG: 5 TABLET ORAL at 17:44

## 2018-06-28 RX ADMIN — METRONIDAZOLE 500 MG: 500 INJECTION, SOLUTION INTRAVENOUS at 16:19

## 2018-06-28 RX ADMIN — FENTANYL CITRATE 100 MCG: 50 INJECTION, SOLUTION INTRAMUSCULAR; INTRAVENOUS at 16:25

## 2018-06-28 RX ADMIN — SODIUM CHLORIDE, POTASSIUM CHLORIDE, SODIUM LACTATE AND CALCIUM CHLORIDE: 600; 310; 30; 20 INJECTION, SOLUTION INTRAVENOUS at 16:19

## 2018-06-28 RX ADMIN — ONDANSETRON 4 MG: 2 INJECTION, SOLUTION INTRAMUSCULAR; INTRAVENOUS at 12:32

## 2018-06-28 RX ADMIN — SODIUM CHLORIDE 65 ML: 9 INJECTION, SOLUTION INTRAVENOUS at 13:26

## 2018-06-28 ASSESSMENT — ENCOUNTER SYMPTOMS
FEVER: 0
CHILLS: 1
ROS SKIN COMMENTS: PERIRECTAL ABSCESS
NAUSEA: 1
COLOR CHANGE: 1
VOMITING: 1

## 2018-06-28 NOTE — DISCHARGE INSTRUCTIONS
Perianal Abscess, Incision and Drainage  Glands near the anus can become blocked. This can lead to infection. If the infection cannot drain, a collection of pus called an abscess may form. Symptoms of an abscess include pain, itching, swelling, and fever.  Treatment of this infection has required an incision to drain the pus from the abscess. A gauze packing may have been put into the abscess opening. This should be removed within 1-2 days. if it falls out sooner, do not try to put it back in. Treatment with antibiotics may or may not be needed.  Healing of the wound will take about 1 to 2 weeks, depending on the size of the abscess.  Home care    The wound may drain for the first several days. Cover the wound with a clean dry bandage. Change the dressing if it becomes soaked with blood or pus, or soiled with feces.    If gauze packing was placed inside the abscess cavity, you may be told to remove it yourself. Do this only do it if the doctor told you to. You may do this in the shower. Once the packing is removed, wash the area carefully once a day until the skin opening has closed.     Try sitz baths. Sit in a tub filled with about 6 inches of hot water for 15-30 minutes. (Test the water temperature before sitting down to ensure it will not burn you.) Repeat this twice a day until pain is relieved.    If you were prescribed antibiotics, take all of the medicine as prescribed. Continue it even if you start feeling better. All of the medicine should be finished unless your healthcare provider tells you to stop.    Unless a pain medicine has been prescribed, you may take an over-the-counter medicine, such as ibuprofen, for pain.    Passing stools may be painful. If so, ask your healthcare provider about using a stool-softener for a short time.  Follow-up care  Follow up with your healthcare provider as advised by our staff.  When to seek medical advice  Call your health care provider if any of the following  occur:    Increasing pain, swelling, or redness    Pus continuing to drain from the wound 5 days after the incision    Fever of 100.4 F (38 C) or higher, or as directed by your healthcare provider  Date Last Reviewed: 6/22/2015 2000-2017 [employer]. [address] [place] [id number]. All rights reserved. This information is not intended as a substitute for professional medical care. Always follow your healthcare professional's instructions.      Opioid Medication Information    You have been given a prescription for an opioid (narcotic) pain medicine and/or have received a pain medicine while here in the Emergency Department. These medicines can make you drowsy or impaired. You must not drive, operate dangerous equipment, or engage in any other dangerous activities while taking these medications. If you drive while taking these medications, you could be arrested for DUI, or driving under the influence. Do not drink any alcohol while you are taking these medications.   Opioid pain medications can cause addiction. If you have a history of chemical dependency of any type, you are at a higher risk of becoming addicted to pain medications.  Only take these prescribed medications to treat your pain when all other options have been tried. Take it for as short a time and as few doses as possible. Store your pain pills in a secure place, as they are frequently stolen and provide a dangerous opportunity for children or visitors in your house to start abusing these powerful medications. We will not replace any lost or stolen medicine.  As soon as your pain is better, you should flush all your remaining medication.   Many prescription pain medications contain Tylenol  (acetaminophen), including Vicodin , Tylenol #3 , Norco , Lortab , and Percocet .  You should not take any extra pills of Tylenol  if you are using these prescription medications or you can get very sick.  Do not ever take more than 4000  mg of acetaminophen in any 24 hour period.  All opioids tend to cause constipation. Drink plenty of water and eat foods that have a lot of fiber, such as fruits, vegetables, prune juice, apple juice and high fiber cereal.  Take a laxative if you don t move your bowels at least every other day. Miralax , Milk of Magnesia, Colace , or Senna  can be used to keep you regular.        GENERAL ANESTHESIA OR SEDATION ADULT DISCHARGE INSTRUCTIONS   SPECIAL PRECAUTIONS FOR 24 HOURS AFTER SURGERY    IT IS NOT UNUSUAL TO FEEL LIGHT-HEADED OR FAINT, UP TO 24 HOURS AFTER SURGERY OR WHILE TAKING PAIN MEDICATION.  IF YOU HAVE THESE SYMPTOMS; SIT FOR A FEW MINUTES BEFORE STANDING AND HAVE SOMEONE ASSIST YOU WHEN YOU GET UP TO WALK OR USE THE BATHROOM.    YOU SHOULD REST AND RELAX FOR THE NEXT 24 HOURS AND YOU MUST MAKE ARRANGEMENTS TO HAVE SOMEONE STAY WITH YOU FOR AT LEAST 24 HOURS AFTER YOUR DISCHARGE.  AVOID HAZARDOUS AND STRENUOUS ACTIVITIES.  DO NOT MAKE IMPORTANT DECISIONS FOR 24 HOURS.    DO NOT DRIVE ANY VEHICLE OR OPERATE MECHANICAL EQUIPMENT FOR 24 HOURS FOLLOWING THE END OF YOUR SURGERY.  EVEN THOUGH YOU MAY FEEL NORMAL, YOUR REACTIONS MAY BE AFFECTED BY THE MEDICATION YOU HAVE RECEIVED.    DO NOT DRINK ALCOHOLIC BEVERAGES FOR 24 HOURS FOLLOWING YOUR SURGERY.    DRINK CLEAR LIQUIDS (APPLE JUICE, GINGER ALE, 7-UP, BROTH, ETC.).  PROGRESS TO YOUR REGULAR DIET AS YOU FEEL ABLE.    YOU MAY HAVE A DRY MOUTH, A SORE THROAT, MUSCLES ACHES OR TROUBLE SLEEPING.  THESE SHOULD GO AWAY AFTER 24 HOURS.    CALL YOUR DOCTOR FOR ANY OF THE FOLLOWING:  SIGNS OF INFECTION (FEVER, GROWING TENDERNESS AT THE SURGERY SITE, A LARGE AMOUNT OF DRAINAGE OR BLEEDING, SEVERE PAIN, FOUL-SMELLING DRAINAGE, REDNESS OR SWELLING.    IT HAS BEEN OVER 8 TO 10 HOURS SINCE SURGERY AND YOU ARE STILL NOT ABLE TO URINATE (PASS WATER).     You received one oxyCODONE IR 5 MG tablet at 5:45 pm    DR. WALT JONES M.D.  CLINIC PHONE NUMBER:   [phone]

## 2018-06-28 NOTE — IP AVS SNAPSHOT
MRN:[id number]                      After Visit Summary   6/28/2018    Olga Sheehan    MRN: [id number]           Thank you!     Thank you for choosing [facility] for your care. Our goal is always to provide you with excellent care. Hearing back from our patients is one way we can continue to improve our services. Please take a few minutes to complete the written survey that you may receive in the mail after you visit. If you would like to speak to someone directly about your visit please contact Patient Relations at [phone]. Thank you!          Patient Information     Date Of Birth          1990        About your hospital stay     You were admitted on:  N/A You last received care in the:  [facility] PreOP/PostOP    You were discharged on:  June 28, 2018       Who to Call     For medical emergencies, please call [phone].  For non-urgent questions about your medical care, please call your primary care provider or clinic, None  For questions related to your surgery, please call your surgery clinic        Attending Provider     Provider Specialty    Catarina Chi MD Emergency Medicine       Primary Care Provider Fax #    Physician No Ref-Primary [phone]      After Care Instructions     Diet Instructions       Resume pre-procedure diet            Discharge Instructions       Make an appointment to follow up with Dr. Davis or one of our PA's in 3-6 weeks or as scheduled if previously arranged. Call [phone] to schedule this appointment. Call the office if you develop pain that is not controlled with pain medication, bleeding that does not stop, fever, or constipation.   Dress the area with dry gauze.   Do warm baths several times daily.   Take Metamucil 1 tablespoon daily in 8 ounces of fluid to prevent constipation.   Resume activities as tolerated.   No driving while taking narcotics.   If you have trouble urinating, soak in a hot tub for 15 minutes.  If you are  unable to void, call our office.  You may need to go to the emergency department to have a catheter placed.  If you have not had a bowel movement with in 24 hours after surgery, take one bottle of magnesium citrate.  If this does not produce a bowel movement within 6 hours, mix one 8.3 oz bottle of Miralax in 64 ounces of Gatorade.  Drink this over a period of about 3 hours.  If no bowel movement within 8 hours of doing this, call our office.            Dressing       Keep dressing clean and dry.  Dressing / incisional care as instructed by RN and or Surgeon            Ice to affected area       Ice to operative site PRN            No Alcohol       For 24 hours post procedure                  Further instructions from your care team         Perianal Abscess, Incision and Drainage  Glands near the anus can become blocked. This can lead to infection. If the infection cannot drain, a collection of pus called an abscess may form. Symptoms of an abscess include pain, itching, swelling, and fever.  Treatment of this infection has required an incision to drain the pus from the abscess. A gauze packing may have been put into the abscess opening. This should be removed within 1-2 days. if it falls out sooner, do not try to put it back in. Treatment with antibiotics may or may not be needed.  Healing of the wound will take about 1 to 2 weeks, depending on the size of the abscess.  Home care    The wound may drain for the first several days. Cover the wound with a clean dry bandage. Change the dressing if it becomes soaked with blood or pus, or soiled with feces.    If gauze packing was placed inside the abscess cavity, you may be told to remove it yourself. Do this only do it if the doctor told you to. You may do this in the shower. Once the packing is removed, wash the area carefully once a day until the skin opening has closed.     Try sitz baths. Sit in a tub filled with about 6 inches of hot water for 15-30 minutes. (Test  the water temperature before sitting down to ensure it will not burn you.) Repeat this twice a day until pain is relieved.    If you were prescribed antibiotics, take all of the medicine as prescribed. Continue it even if you start feeling better. All of the medicine should be finished unless your healthcare provider tells you to stop.    Unless a pain medicine has been prescribed, you may take an over-the-counter medicine, such as ibuprofen, for pain.    Passing stools may be painful. If so, ask your healthcare provider about using a stool-softener for a short time.  Follow-up care  Follow up with your healthcare provider as advised by our staff.  When to seek medical advice  Call your health care provider if any of the following occur:    Increasing pain, swelling, or redness    Pus continuing to drain from the wound 5 days after the incision    Fever of 100.4 F (38 C) or higher, or as directed by your healthcare provider  Date Last Reviewed: 6/22/2015 2000-2017 [employer]. [address] [place] [address]. All rights reserved. This information is not intended as a substitute for professional medical care. Always follow your healthcare professional's instructions.      Opioid Medication Information    You have been given a prescription for an opioid (narcotic) pain medicine and/or have received a pain medicine while here in the Emergency Department. These medicines can make you drowsy or impaired. You must not drive, operate dangerous equipment, or engage in any other dangerous activities while taking these medications. If you drive while taking these medications, you could be arrested for DUI, or driving under the influence. Do not drink any alcohol while you are taking these medications.   Opioid pain medications can cause addiction. If you have a history of chemical dependency of any type, you are at a higher risk of becoming addicted to pain medications.  Only take these prescribed  medications to treat your pain when all other options have been tried. Take it for as short a time and as few doses as possible. Store your pain pills in a secure place, as they are frequently stolen and provide a dangerous opportunity for children or visitors in your house to start abusing these powerful medications. We will not replace any lost or stolen medicine.  As soon as your pain is better, you should flush all your remaining medication.   Many prescription pain medications contain Tylenol  (acetaminophen), including Vicodin , Tylenol #3 , Norco , Lortab , and Percocet .  You should not take any extra pills of Tylenol  if you are using these prescription medications or you can get very sick.  Do not ever take more than 4000 mg of acetaminophen in any 24 hour period.  All opioids tend to cause constipation. Drink plenty of water and eat foods that have a lot of fiber, such as fruits, vegetables, prune juice, apple juice and high fiber cereal.  Take a laxative if you don t move your bowels at least every other day. Miralax , Milk of Magnesia, Colace , or Senna  can be used to keep you regular.        GENERAL ANESTHESIA OR SEDATION ADULT DISCHARGE INSTRUCTIONS   SPECIAL PRECAUTIONS FOR 24 HOURS AFTER SURGERY    IT IS NOT UNUSUAL TO FEEL LIGHT-HEADED OR FAINT, UP TO 24 HOURS AFTER SURGERY OR WHILE TAKING PAIN MEDICATION.  IF YOU HAVE THESE SYMPTOMS; SIT FOR A FEW MINUTES BEFORE STANDING AND HAVE SOMEONE ASSIST YOU WHEN YOU GET UP TO WALK OR USE THE BATHROOM.    YOU SHOULD REST AND RELAX FOR THE NEXT 24 HOURS AND YOU MUST MAKE ARRANGEMENTS TO HAVE SOMEONE STAY WITH YOU FOR AT LEAST 24 HOURS AFTER YOUR DISCHARGE.  AVOID HAZARDOUS AND STRENUOUS ACTIVITIES.  DO NOT MAKE IMPORTANT DECISIONS FOR 24 HOURS.    DO NOT DRIVE ANY VEHICLE OR OPERATE MECHANICAL EQUIPMENT FOR 24 HOURS FOLLOWING THE END OF YOUR SURGERY.  EVEN THOUGH YOU MAY FEEL NORMAL, YOUR REACTIONS MAY BE AFFECTED BY THE MEDICATION YOU HAVE RECEIVED.    DO  "NOT DRINK ALCOHOLIC BEVERAGES FOR 24 HOURS FOLLOWING YOUR SURGERY.    DRINK CLEAR LIQUIDS (APPLE JUICE, GINGER ALE, 7-UP, BROTH, ETC.).  PROGRESS TO YOUR REGULAR DIET AS YOU FEEL ABLE.    YOU MAY HAVE A DRY MOUTH, A SORE THROAT, MUSCLES ACHES OR TROUBLE SLEEPING.  THESE SHOULD GO AWAY AFTER 24 HOURS.    CALL YOUR DOCTOR FOR ANY OF THE FOLLOWING:  SIGNS OF INFECTION (FEVER, GROWING TENDERNESS AT THE SURGERY SITE, A LARGE AMOUNT OF DRAINAGE OR BLEEDING, SEVERE PAIN, FOUL-SMELLING DRAINAGE, REDNESS OR SWELLING.    IT HAS BEEN OVER 8 TO 10 HOURS SINCE SURGERY AND YOU ARE STILL NOT ABLE TO URINATE (PASS WATER).     You received one oxyCODONE IR 5 MG tablet at 5:45 pm    DR. WALT JONES M.D.  CLINIC PHONE NUMBER:  [phone]              Pending Results     Date and Time Order Name Status Description    2018 1457 EKG 12-lead, tracing only Preliminary             Admission Information     Date & Time Department Dept. Phone    2018 [facility] [place] [facility] PreOP/PostOP [phone]      Your Vitals Were     Blood Pressure Temperature Respirations Height Weight Last Period    110 99.2  F (37.3  C) (Temporal) 24 1.702 m (5' 7\") 163.3 kg (360 lb) 2018    Pulse Oximetry BMI (Body Mass Index)                96% 56.38 kg/m2          [employer] Information     [employer] lets you send messages to your doctor, view your test results, renew your prescriptions, schedule appointments and more. To sign up, go to [email or web address] . Click on \"Log in\" on the left side of the screen, which will take you to the Welcome page. Then click on \"Sign up Now\" on the right side of the page.     You will be asked to enter the access code listed below, as well as some personal information. Please follow the directions to create your username and password.     Your access code is: [email or web address]  Expires: 2018  3:15 PM     Your access code will  in 90 days. If you need help or a new code, please call your [facility] or " [phone].        Care EveryWhere ID     This is your Care EveryWhere ID. This could be used by other organizations to access your [place] medical records  [id number]        Equal Access to Services     BYRON MEDRANO : Hadii aad ku hadshajibo Joseph, wadarynda luqadaha, qaybta kaalmada cale, zaire hernandez shrutibianca piña samantha driscoll. So [facility] [phone].    ATENCIÓN: Si habla español, tiene a tolbert disposición servicios gratuitos de asistencia lingüística. Llame al [phone].    We comply with applicable federal civil rights laws and [place] laws. We do not discriminate on the basis of race, color, national origin, age, disability, sex, sexual orientation, or gender identity.               Review of your medicines      START taking        Dose / Directions    lidocaine 2 % topical gel   Commonly known as:  XYLOCAINE   Used for:  Perianal abscess        Apply topically as needed for moderate pain Apply to anus qid as needed for pain   Quantity:  30 mL   Refills:  3         CONTINUE these medicines which may have CHANGED, or have new prescriptions. If we are uncertain of the size of tablets/capsules you have at home, strength may be listed as something that might have changed.        Dose / Directions    * ondansetron 4 MG ODT tab   Commonly known as:  ZOFRAN ODT   This may have changed:  Another medication with the same name was added. Make sure you understand how and when to take each.        Dose:  4 mg   Take 1 tablet by mouth every 6 hours as needed for nausea.   Quantity:  10 tablet   Refills:  0       * ondansetron 4 MG ODT tab   Commonly known as:  ZOFRAN ODT   This may have changed:  You were already taking a medication with the same name, and this prescription was added. Make sure you understand how and when to take each.        Dose:  4 mg   Take 1 tablet (4 mg) by mouth every 8 hours as needed   Quantity:  10 tablet   Refills:  0       * oxyCODONE IR 5 MG tablet   Commonly known as:  ROXICODONE    This may have changed:  Another medication with the same name was added. Make sure you understand how and when to take each.        Dose:  5-10 mg   Take 1-2 tablets (5-10 mg) by mouth every 6 hours as needed for pain   Quantity:  15 tablet   Refills:  0       * oxyCODONE IR 5 MG tablet   Commonly known as:  ROXICODONE   This may have changed:  You were already taking a medication with the same name, and this prescription was added. Make sure you understand how and when to take each.        Dose:  5 mg   Take 1 tablet (5 mg) by mouth every 6 hours as needed for pain   Quantity:  12 tablet   Refills:  0       * oxyCODONE IR 5 MG tablet   Commonly known as:  ROXICODONE   This may have changed:  You were already taking a medication with the same name, and this prescription was added. Make sure you understand how and when to take each.   Used for:  Perianal abscess        Dose:  5-10 mg   Take 1-2 tablets (5-10 mg) by mouth every 3 hours as needed for pain or other (Moderate to Severe)   Quantity:  13 tablet   Refills:  0       * Notice:  This list has 5 medication(s) that are the same as other medications prescribed for you. Read the directions carefully, and ask your doctor or other care provider to review them with you.      CONTINUE these medicines which have NOT CHANGED        Dose / Directions    ALBUTEROL INHALER 17GM        None Entered   Refills:  0       doxycycline 100 MG capsule   Commonly known as:  VIBRAMYCIN        Dose:  100 mg   Take 1 capsule (100 mg) by mouth 2 times daily for 7 days   Quantity:  14 capsule   Refills:  0       ibuprofen 600 MG tablet   Commonly known as:  ADVIL/MOTRIN        Dose:  600 mg   Take 1 tablet (600 mg) by mouth every 8 hours as needed for moderate pain   Quantity:  30 tablet   Refills:  0       ondansetron 4 MG tablet   Commonly known as:  ZOFRAN        Dose:  4 mg   Take 1 tablet by mouth every 8 hours as needed for nausea.   Quantity:  6 tablet   Refills:  0        polyethylene glycol powder   Commonly known as:  MIRALAX        Dose:  1 capful   Take 17 g (1 capful) by mouth daily Mix with 8 ounces of water or other liquid   Quantity:  510 g   Refills:  1            Where to get your medicines      These medications were sent to [facility] [place], [place] - [address] [facility]  [address] [facility] [address]     Phone:  [phone]     lidocaine 2 % topical gel         Some of these will need a paper prescription and others can be bought over the counter. Ask your nurse if you have questions.     Bring a paper prescription for each of these medications     ondansetron 4 MG ODT tab    oxyCODONE IR 5 MG tablet    oxyCODONE IR 5 MG tablet                Protect others around you: Learn how to safely use, store and throw away your medicines at www.disposemymeds.org.        Information about OPIOIDS     PRESCRIPTION OPIOIDS: WHAT YOU NEED TO KNOW   We gave you an opioid (narcotic) pain medicine. It is important to manage your pain, but opioids are not always the best choice. You should first try all the other options your care team gave you. Take this medicine for as short a time (and as few doses) as possible.     These medicines have risks:    DO NOT drive when on new or higher doses of pain medicine. These medicines can affect your alertness and reaction times, and you could be arrested for driving under the influence (DUI). If you need to use opioids long-term, talk to your care team about driving.    DO NOT operate heave machinery    DO NOT do any other dangerous activities while taking these medicines.     DO NOT drink any alcohol while taking these medicines.      If the opioid prescribed includes acetaminophen, DO NOT take with any other medicines that contain acetaminophen. Read all labels carefully. Look for the word  acetaminophen  or  Tylenol.  Ask your pharmacist if you have questions or are unsure.    You can get addicted to pain  medicines, especially if you have a history of addiction (chemical, alcohol or substance dependence). Talk to your care team about ways to reduce this risk.    Store your pills in a secure place, locked if possible. We will not replace any lost or stolen medicine. If you don t finish your medicine, please throw away (dispose) as directed by your pharmacist. The [place] [employer]ution [employer] has more information about safe disposal: https://www.pca.[facility].[place].us/living-green/managing-unwanted-medications.     All opioids tend to cause constipation. Drink plenty of water and eat foods that have a lot of fiber, such as fruits, vegetables, prune juice, apple juice and high-fiber cereal. Take a laxative (Miralax, milk of magnesia, Colace, Senna) if you don t move your bowels at least every other day.              Medication List: This is a list of all your medications and when to take them. Check marks below indicate your daily home schedule. Keep this list as a reference.      Medications           Morning Afternoon Evening Bedtime As Needed    ALBUTEROL INHALER 17GM   None Entered                                doxycycline 100 MG capsule   Commonly known as:  VIBRAMYCIN   Take 1 capsule (100 mg) by mouth 2 times daily for 7 days                                ibuprofen 600 MG tablet   Commonly known as:  ADVIL/MOTRIN   Take 1 tablet (600 mg) by mouth every 8 hours as needed for moderate pain                                lidocaine 2 % topical gel   Commonly known as:  XYLOCAINE   Apply topically as needed for moderate pain Apply to anus qid as needed for pain                                * ondansetron 4 MG ODT tab   Commonly known as:  ZOFRAN ODT   Take 1 tablet by mouth every 6 hours as needed for nausea.                                * ondansetron 4 MG ODT tab   Commonly known as:  ZOFRAN ODT   Take 1 tablet (4 mg) by mouth every 8 hours as needed                                ondansetron 4 MG tablet    Commonly known as:  ZOFRAN   Take 1 tablet by mouth every 8 hours as needed for nausea.                                * oxyCODONE IR 5 MG tablet   Commonly known as:  ROXICODONE   Take 1-2 tablets (5-10 mg) by mouth every 6 hours as needed for pain   Last time this was given:  5 mg on 6/28/2018  5:44 PM                                * oxyCODONE IR 5 MG tablet   Commonly known as:  ROXICODONE   Take 1 tablet (5 mg) by mouth every 6 hours as needed for pain   Last time this was given:  5 mg on 6/28/2018  5:44 PM                                * oxyCODONE IR 5 MG tablet   Commonly known as:  ROXICODONE   Take 1-2 tablets (5-10 mg) by mouth every 3 hours as needed for pain or other (Moderate to Severe)   Last time this was given:  5 mg on 6/28/2018  5:44 PM                                polyethylene glycol powder   Commonly known as:  MIRALAX   Take 17 g (1 capful) by mouth daily Mix with 8 ounces of water or other liquid                                * Notice:  This list has 5 medication(s) that are the same as other medications prescribed for you. Read the directions carefully, and ask your doctor or other care provider to review them with you.

## 2018-06-28 NOTE — ANESTHESIA CARE TRANSFER NOTE
Patient: Olga Sheehan    Procedure(s):  Exam under anesthesia, incision and drainage perianal abscess - Wound Class: III-Contaminated   - Wound Class: III-Contaminated    Diagnosis: Abscess  Diagnosis Additional Information: No value filed.    Anesthesia Type:   General, ETT     Note:  Airway :Face Mask  Patient transferred to:PACU  Handoff Report: Identifed the Patient, Identified the Reponsible Provider, Reviewed the pertinent medical history, Discussed the surgical course, Reviewed Intra-OP anesthesia mangement and issues during anesthesia, Set expectations for post-procedure period and Allowed opportunity for questions and acknowledgement of understanding      Vitals: (Last set prior to Anesthesia Care Transfer)    CRNA VITALS  6/28/2018 1631 - 6/28/2018 1701      6/28/2018             Resp Rate (observed): (!)  1                Electronically Signed By: NIALL Lara CRNA  June 28, 2018  5:01 PM

## 2018-06-28 NOTE — ANESTHESIA POSTPROCEDURE EVALUATION
Patient: Olga Sheehan    Procedure(s):  Exam under anesthesia, incision and drainage perianal abscess - Wound Class: III-Contaminated   - Wound Class: III-Contaminated    Diagnosis:Abscess  Diagnosis Additional Information: Pre-operative diagnosis: Abscess  Post-operative diagnosis Left anterior perianal abscess, ischiorectal fossa  Procedure: Procedure(s):  Exam under anesthesia, incision and drainage perianal abscess - Wound Class: III-Contaminated   - Wound Class: II, I-Contaminated  Surgeon(s): Surgeon(s) and Role:     * Dave Davis MD - Primary  Estimated blood loss: * No values recorded between 6/28/2018  4:36 PM and 6/28/2018  4:52 PM *               Specimens: * No specimens in log *  Findings: See note , for full details         Anesthesia Type:  General, ETT    Note:  Anesthesia Post Evaluation    Patient location during evaluation: PACU  Patient participation: Able to fully participate in evaluation  Level of consciousness: awake  Pain management: adequate  Airway patency: patent  Cardiovascular status: acceptable  Respiratory status: acceptable  Hydration status: acceptable  PONV: none     Anesthetic complications: None          Last vitals:  Vitals:    06/28/18 1653 06/28/18 1655 06/28/18 1700   BP: (!) 80/68 90/74 110/69   Resp: 12 12 12   Temp: 99.2  F (37.3  C)     SpO2: 100% 100% 100%         Electronically Signed By: Luca Singer MD  June 28, 2018  5:10 PM

## 2018-06-28 NOTE — CONSULTS
[facility]  Colon and Rectal Surgery Consult Note  Name: Olga Sheehan    MRN: [id number]  YOB: 1990    Age: 28 year old  Date of admission: 6/28/2018  Primary care provider: No Ref-Primary, Physician     Requesting Physician:  Dr. Chi  Reason for consult:  Perianal abscess           History of Present Illness:   Olga Sheehan is a 28 year old female, seen at the request of Dr. Chi, who presents with rectal pain.    This has been present for 5 days and started after several days of diarrhea.  She developed pain and swelling near the anus.  She first thought it was a hemorrhoid.  She presented to the ED yesterday where bedside US revealed perianal abscess.  A bedside I&D was performed, and she was discharged home with pain medication and doxycycline.  She reports that she awoke today with significantly worse pain and felt as if the area was more swollen.  She returned to the ED today where CT pelvis revealed a 2.5 cm abscess in the left perianal region.  She has never had an abscess previously.  She denies history of Crohn's or UC.  No FHx of colorectal cancer or IBD that she knows of.  She denies prior anorectal surgeries or problems with accidental bowel leakage.  Also denies rectal bleeding.      Colonoscopy History:  None    Past abdominal surgery: None            Past Medical History:     Past Medical History:   Diagnosis Date     Asthma              Past Surgical History:     Past Surgical History:   Procedure Laterality Date     ENT SURGERY                 Social History:     Social History   Substance Use Topics     Smoking status: Current Some Day Smoker     Packs/day: 1.00     Smokeless tobacco: Never Used     Alcohol use Yes      Comment: occasionally             Family History:   No family history on file.          Allergies:     Allergies   Allergen Reactions     Ceclor [Cefaclor]      Penicillins              Medications:             Review of Systems:   A comprehensive  "greater than 10 system review of systems was carried out.  Pertinent positives and negatives are noted above.  Otherwise negative for contributory info.            Physical Exam:     Blood pressure 142/89, temperature 99.9  F (37.7  C), temperature source Oral, resp. rate 18, height 1.702 m (5' 7\"), weight (!) 163.3 kg (360 lb), last menstrual period 06/13/2018, SpO2 97 %, not currently breastfeeding.  No intake or output data in the 24 hours ending 06/28/18 1427  Exam:  General - Awake alert and oriented, appears stated age  Pulm - Non-labored breathing with normal respiratory effort  CVS - reg rate and rhythm, no peripheral edema  Abd - Obese, soft.  No guarding, rigidity or peritoneal signs.   Rectal - Erythema, induration, and significant tenderness at the left anterior perianal space. Prior linear I&D incision noted at left anterior. No evidence of thrombosed external hemorrhoids or anal fissure.  KELLI and anoscopy deferred.  Bedside I&D performed. Please see procedure note below.  Neuro - CN II-XII grossly intact  Musculoskeletal - extremities with no clubbing, cyanosis or edema; able to ambulate  Psych - responsive, alert, cooperative; oriented x3; appropriate mood and affect  External/skin - inspection reveals no rashes, lesions or ulcers, normal coloring    PROCEDURE NOTE:  Incision and Drainage Ischiorectal Abscess  Description of Procedure:  Obtained verbal consent. Site was correctly identified.  Patient was placed in left lateral position.  5 cc of 1% lidocaine with epinephrine was injected in left anterior perianal space.  A #15 blade was used to make a 1 cm cruciate incision.  3 cc of bloody drainage was expressed.  The wound was left open with dry gauze placed over the wound for drainage.  Difficult procedure due to patient's pain and body habitus.           Data Reviewed:     Recent Results (from the past 48 hour(s))   POC US SOFT TISSUE    Impression    [facility] Procedure Note     Limited " Bedside ED Ultrasound of Soft Tissue:    PROCEDURE: PERFORMED BY: Dr. Adams Billings  INDICATIONS/SYMPTOM: Skin redness, evaluate for abscess, cellulitis or foreign body  PROBE: High frequency linear probe  BODY LOCATION: Soft tissue located on perineum     FINDINGS: Cobblestoning of soft tissue: absent   Hypoechoic fluid (ie abscess) identified: present measuring 2 x 2 cm      INTERPRETATION:  The soft tissue and muscle layers were evaluated.      Findings indicate abscess    IMAGE DOCUMENTATION: Images were archived to PACs system.     CT Pelvis w Contrast    Narrative    CT PELVIS WITH CONTRAST  6/28/2018 1:32 PM      HISTORY:  Perirectal abscess.    TECHNIQUE: CT pelvis with intravenous contrast. Radiation dose for  this scan was reduced using automated exposure control, adjustment of  the mA and/or kV according to patient size, or iterative  reconstruction technique. 100 mL Isovue-370     COMPARISON:  2/21/2018.    FINDINGS: There is a small fluid collection to the posterior left of  the anal verge measuring approximately 1.8 x 1.6 x 2.5 cm. There is  associated soft tissue thickening. Minimal inflammatory changes in the  adjacent fat. There is no intrapelvic extension. Visualized bowel  appears normal. The uterus and adnexa are within normal limits. No  free pelvic fluid. Few pelvic phleboliths.      Impression    IMPRESSION: 2.5 cm abscess in the posterior left perianal region. No  intrapelvic extension.    OKSANA DONNELLY MD         Recent Labs  Lab 06/28/18  1238   WBC 10.7   HGB 13.0   HCT 41.1   MCV 89          Recent Labs  Lab 06/28/18  1238      POTASSIUM 3.7   CHLORIDE 105   CO2 26   ANIONGAP 6   *   BUN 6*   CR 0.77   GFRESTIMATED 88   GFRESTBLACK >90   JEFRY 8.5     No results for input(s): COLOR, APPEARANCE, URINEGLC, URINEBILI, URINEKETONE, SG, UBLD, URINEPH, PROTEIN, UROBILINOGEN, NITRITE, LEUKEST, RBCU, WBCU in the last 168 hours.      Assessment and Plan:   Olga Sheehan  is a 28 year old female who presented with rectal pain for 5 days.  She was seen in the ED yesterday where bedside US demonstrated perianal abscess.  She underwent bedside I&D and was discharged home on doxycycline.  She returned to the ED today for worsening rectal pain and swelling.  She is afebrile, slightly tachycardic with a normal WBC.  CT pelvis revealed 2.5 cm perianal abscess.  Repeat bedside I&D attempted with minimal return of bloody drainage.  Discussed with Dr. Davis who examined patient in ED and recommended drainage of abscess in the OR.  Patient has not had anything to eat or drink since 0700 today.  Will proceed to OR now.  Will get pre-op EKG.    Plan:  1. Surgery: Given patient's tenderness and need for repeat drainage, Dr. Davis recommending OR for drainage of abscess.  He discussed indications and risks of procedure with patient and patient's mother which include but are not limited to infection, bleeding, urinary retention, anesthesia reaction, alteration of control of bowel function, and need for further surgery.  2. Diet: NPO  3. IV Fluids: Continue NS  4. Antibiotics:  None at this time  5. Medications:  Hold meds while NPO, can resume post-op  6. I&O s:  strict I&O s   7. Labs:   - Reviewed: Yes  - Ordered: None   8. Imaging:   - I have personally viewed: CT abd/pelvis from 6/27 and CT pelvis from 6/28/18  - Ordered:  None  9. Activity:  OOB, ambulate as able  10. DVT prophylaxis: SCD s,   11. This plan has been discussed with Dr. Davis    Patient specific identified risk factors considered as part of today s evaluation include: BMI, smoker (1 PPD)    Additional history obtained from chart review.  Time spent on consultation: 30 minutes, greater than 50% spent on counseling and/or coordination of care.          Donna Roy PA-C  [facility]  [phone]'

## 2018-06-28 NOTE — ANESTHESIA PREPROCEDURE EVALUATION
PAC NOTE:       ANESTHESIA PRE EVALUATION:  Anesthesia Evaluation     .             ROS/MED HX    ENT/Pulmonary:     (+)asthma , . .    Neurologic:       Cardiovascular:         METS/Exercise Tolerance:     Hematologic:         Musculoskeletal:         GI/Hepatic:     (+) liver disease, perirectal abscess      Renal/Genitourinary:         Endo:     (+) Obesity, .      Psychiatric:     (+) psychiatric history bipolar and other (comment)      Infectious Disease:         Malignancy:         Other:                     Physical Exam  Normal systems: cardiovascular and pulmonary    Airway   Mallampati: II  TM distance: >3 FB  Neck ROM: full    Dental     Cardiovascular       Pulmonary              Anesthesia Plan      History & Physical Review  History and physical reviewed and following examination; no interval change.    ASA Status:  2 .    NPO Status:  > 8 hours    Plan for General and ETT with Intravenous and Propofol induction. Maintenance will be Balanced.    PONV prophylaxis:  Ondansetron (or other 5HT-3) and Dexamethasone or Solumedrol       Postoperative Care  Postoperative pain management:  IV analgesics.      Consents  Anesthetic plan, risks, benefits and alternatives discussed with:  Patient.  Use of blood products discussed: Yes.   Use of blood products discussed with Patient.  Consented to blood products.  .                            .

## 2018-06-28 NOTE — ED TRIAGE NOTES
Pt has perirectal abcess since Sunday and was seen in ED with I and ENRRIQUE.  She reports increased pain today and is vomiting.

## 2018-06-28 NOTE — BRIEF OP NOTE
[facility] Brief Operative Note    Pre-operative diagnosis: Abscess   Post-operative diagnosis Left anterior perianal abscess, ischiorectal fossa   Procedure: Procedure(s):  Exam under anesthesia, incision and drainage perianal abscess - Wound Class: III-Contaminated   - Wound Class: III-Contaminated   Surgeon(s): Surgeon(s) and Role:     * Dave Davis MD - Primary   Estimated blood loss: * No values recorded between 6/28/2018  4:36 PM and 6/28/2018  4:52 PM *    Specimens: * No specimens in log *   Findings: See note for full details

## 2018-06-28 NOTE — ED PROVIDER NOTES
"  History     Chief Complaint:  Wound Check    HPI   Olga Sheehna is a 28 year old female who presents for evaluation of a perirectal abscess. The patient states she first noticed an abscess on Saturday night, 5 days ago, and was unable to be seen until yesterday when she came to the ED. At that time, the patient was seen by Dr. Billings who drained the abscess and sent her home with Doxycycline and Oxycodone. On returning home, the patient reports she tried to take the Doxycycline, but was very nauseous and promptly vomited it up. Since then, she notes the abscess has become more painful and she continues to feel nauseous, vomiting frequently. This morning, she also notes she felt feverish, so she came back to the ED for further evaluation. On presentation to the ED, the patient notes pain primarily over the abscess, which is located to the left of her rectum. She denies pain radiating elsewhere, but notes the abscess has been more swollen and is draining.     Allergies:  Ceclor  Penicillins     Medications:    Doxycycline  Tylenol with codeine  Albuterol inhaler  Ativan  Ibuprofen  Zofran  Oxycodone  Miralax  Depression    Past Medical History:    Asthma    Past Surgical History:    ENT surgery    Family History:    History reviewed. No pertinent family history.     Social History:  Smoking status: Yes, 1 pack per day  Alcohol use: Yes, occasionally  Marital Status: Single [1]     Review of Systems   Constitutional: Positive for chills. Negative for fever.   Gastrointestinal: Positive for nausea and vomiting.   Skin: Positive for color change.        Perirectal abscess   All other systems reviewed and are negative.    Physical Exam     Patient Vitals for the past 24 hrs:   BP Temp Temp src Heart Rate Resp SpO2 Height Weight   06/28/18 1245 - - - - - 97 % - -   06/28/18 1110 142/89 99.9  F (37.7  C) Oral 102 18 99 % 1.702 m (5' 7\") (!) 163.3 kg (360 lb)     Physical Exam   Constitutional: She appears " well-developed and well-nourished. She appears distressed.   Eyes: No scleral icterus.   Cardiovascular: Regular rhythm, normal heart sounds and intact distal pulses.  Exam reveals no gallop and no friction rub.    No murmur heard.  tachycardic   Pulmonary/Chest: Effort normal and breath sounds normal. No respiratory distress. She has no wheezes. She has no rales.   Abdominal: Soft. Bowel sounds are normal. She exhibits no distension and no mass. There is no tenderness.   Genitourinary:   Genitourinary Comments: Left buttock lateral to the anus with very tender and mildly indurated area, erythematous and cellulitic appearing.  Scar seen where I&D took place yesterday.  No opening seen on exam.  Patient only tolerated mild palpation of the area.   Musculoskeletal: Normal range of motion. She exhibits no edema.   Neurological: She is alert.   Skin: Skin is warm and dry. No rash noted.   Psychiatric: She has a normal mood and affect.     Emergency Department Course   ECG (15:02:10):  Rate 97 bpm. NM interval 138. QRS duration 86. QT/QTc 330/419. P-R-T axes 10 51 3. Normal sinus rhythm. Normal ECG. Interpreted at 1506 by Catarina Chi MD.    Imaging:  Radiographic findings were communicated to the patient who voiced understanding.    CT Pelvis w Contrast:  2.5 cm abscess in the posterior left perianal region. No  intrapelvic extension.  As read by Radiology.    Laboratory:  CBC: WNL (WBC 10.7, HGB 13.0, )  BMP: Glucose 101 (H), BUN 6 (L), o/w WNL (Creatinine 0.77)  ISTAT HCG: <5.0    Interventions:  1232: NS 1L IV Bolus  1232: 1 mg Dilaudid IV  1232: 4 mg Zofran IV  1308: 4 mg Zofran IV  1308: 1 mg Dilaudid IV  1430: 1 mg Dilaudid IV    Emergency Department Course:  Past medical records, nursing notes, and vitals reviewed.  1210: I performed an exam of the patient and obtained history, as documented above.  IV inserted and blood drawn.  The patient was sent for a pelvic CT while in the emergency department,  findings above.    1220: I spoke with Dr. Billings who saw the patient yesterday. He evaluated the patient here in the ED and notes the abscess appears similar in size and firmness compared to yesterday after he drained it.    1424: I spoke to Donna Roy PA-C on-call for colorectal surgery. Her and her team will attempt to drain the patient's abscess.    1428: I rechecked the patient. Explained findings to the patient.    1457: I spoke with Donna Roy PA-C and Dr. Davis of colorectal surgery who came and evaluated the patient. At this time, she will be taken to the OR for further intervention. They recommended getting an ECG prior to her transfer to the OR. Results above.    The patient was taken to the operating room at this time.    Impression & Plan    Medical Decision Making:  Olga Sheehan is a 28 year old female who presents to the ED for evaluation of worsening pain around her perirectal abscess, which was drained yesterday by Dr. Billings. I did have Dr. Billings look at the wound. There appears to be the level of induration seems to be a little bit more today. I did do a CT pelvis to evaluate the deep extension. Thankfully, there was none. Since this is the patient's second visit, I did involve colorectal to evaluate the patient so she could get the best care possible. She was comfortable with this. Donna Roy and Dr. Davis came to the ED and decided to take the patient to the OR for drainage due to her significant discomfort.    Diagnosis:    ICD-10-CM   1. Perianal abscess K61.0     Disposition: The patient was brought to the operating room    Discharge Medications:  New Prescriptions    ONDANSETRON (ZOFRAN ODT) 4 MG ODT TAB    Take 1 tablet (4 mg) by mouth every 8 hours as needed    OXYCODONE IR (ROXICODONE) 5 MG TABLET    Take 1 tablet (5 mg) by mouth every 6 hours as needed for pain     Krissy Higginbotham  6/28/2018   [facility] EMERGENCY DEPARTMENT    I, Krissy Higginbotham, am  serving as a scribe at 12:10 PM on 6/28/2018 to document services personally performed by Catarina Chi MD based on my observations and the provider's statements to me.        Catarina Chi MD  07/03/18 1731

## 2018-06-28 NOTE — IP AVS SNAPSHOT
[facility] PreOP/PostOP    [address]    [facility] [address]    Phone:  [phone]    Fax:  [phone]                                       After Visit Summary   6/28/2018    Olga Sheehan    MRN: [id number]           After Visit Summary Signature Page     I have received my discharge instructions, and my questions have been answered. I have discussed any challenges I see with this plan with the nurse or doctor.    ..........................................................................................................................................  Patient/Patient Representative Signature      ..........................................................................................................................................  Patient Representative Print Name and Relationship to Patient    ..................................................               ................................................  Date                                            Time    ..........................................................................................................................................  Reviewed by Signature/Title    ...................................................              ..............................................  Date                                                            Time

## 2018-06-28 NOTE — TELEPHONE ENCOUNTER
Reason for Disposition    Taking prescription medication that could cause nausea (e.g., narcotics/opiates, antibiotics, OCPs, many others)    Additional Information    Negative: Shock suspected (e.g., cold/pale/clammy skin, too weak to stand, low BP, rapid pulse)    Negative: Sounds like a life-threatening emergency to the triager    Negative: [1] Nausea or vomiting AND [2] pregnancy < 20 weeks    Negative: Menstrual Period - Missed or Late (i.e., pregnancy suspected)    Negative: Heat exhaustion suspected (i.e., dehydration from heat exposure)    Negative: Motion sickness suspected (i.e., nausea with car, plane, boat, or train travel)    Negative: Anxiety or stress suspected (i.e., nausea with anxiety attacks or stressful situations)    Negative: Traumatic Brain Injury (TBI) suspected    Negative: Vomiting occurs    Negative: Other symptom is present, see that guideline.  (e.g., chest pain, headache, dizziness, abdominal pain, colds, sore throat, etc.).    Negative: Unable to walk, or can only walk with assistance (e.g., requires support)    Negative: Difficulty breathing    Negative: [1] Insulin-dependent diabetes (Type I) AND [2] glucose > 400 mg/dl (22 mmol/l)    Negative: [1] Drinking very little AND [2] dehydration suspected (e.g., no urine > 12 hours, very dry mouth, very lightheaded)    Negative: Patient sounds very sick or weak to the triager    Negative: Fever > 104 F (40 C)    Negative: [1] Fever > 101 F (38.3 C) AND [2] age > 60    Negative: [1] Fever > 101 F (38.3 C) AND [2] bedridden (e.g., nursing home patient, CVA, chronic illness, recovering from surgery)    Negative: [1] Fever > 100.5 F (38.1 C) AND [2] diabetes mellitus or weak immune system (e.g., HIV positive, cancer chemo, splenectomy, organ transplant, chronic steroids)    Negative: Taking any of the following medications: digoxin (Lanoxin), lithium, theophylline, phenytoin (Dilantin)    Negative: Yellowish color of the skin or white of the  eye (i.e., jaundice)    Negative: Fever present > 3 days (72 hours)    Negative: Receiving cancer chemotherapy medication    Protocols used: NAUSEA-ADULT-AH  Caller states she in taking an antibiotic and pain medication for matesu-rectal pain due to abscess. Caller states she is having nausea and vomiting and thinks it is from the antibiotic. Caller states she has not taking the morning dose as of know. Triage guidelines recommend to call provider within 24 hours. Caller verbalized and understands directives.

## 2018-06-29 LAB — INTERPRETATION ECG - MUSE: NORMAL

## 2018-06-29 NOTE — OP NOTE
Procedure Date: 06/28/2018      DATE OF SERVICE: 06/28/2018      PREOPERATIVE DIAGNOSIS:  Left ischiorectal fossa abscess.      POSTOPERATIVE DIAGNOSIS:  Left ischiorectal fossa abscess.      PROCEDURES:  Incision and drainage of left ischiorectal fossa abscess.      SURGEON:  Dave Davis MD      ANESTHESIA:  General.      ESTIMATED BLOOD LOSS:  Was 20 mL.      SPECIMENS:  None.      INDICATIONS:  Olga is a 28-year-old female who has a history of anal pain.  She was seen yesterday in the Emergency Department and the abscess was drained there. Unfortunately, she had continued pain and re-presented to the Emergency Department today.  She also noticed more nausea.  Examination at the bedside revealed a fluctuant area in the left anterior position.  A CT of the pelvis confirmed the presence of an ischiorectal fossa abscess.  I recommended an examination under anesthesia and incision and drainage of the abscess.  The risks, benefits and alternatives of surgery including the risk of bleeding, infection, recurrence, development of fistula and alteration in control of gas and liquid stool were discussed, she wishes to proceed.      DESCRIPTION OF PROCEDURE:  After obtaining informed consent, the patient was brought to the operating room, general anesthesia was induced, the patient was intubated by the anesthesia service without difficulty.  She was placed in the lithotomy position.  The perineum was prepped and draped in the usual sterile fashion.  A timeout was undertaken, which identified the patient and correct procedure.  Examination revealed the stab incision from the prior drainage procedure yesterday,  I inserted a Liz clamp into this and opened up the cavity which expressed approximately 30 mL of bloody purulent material.  I used electrocautery to open up the cavity with the Liz clamp in place to open up the area widely.  Hemostasis was achieved and the wound was packed.  Then 30 mL of 0.5% Marcaine plain was  infiltrated for local anesthesia.  Dressings were applied.  She was turned supine, awakened, extubated and transferred to the PACU in stable condition.  Lap, sponge and needle counts were correct at the end of the case x2.         DAVE DAVIS MD             D: 2018   T: 2018   MT: NTS      Name:     TISH BRITO   MRN:      [id number]        Account:        [id number]   :      1990           Procedure Date: 2018      Document: [id number]       cc: Dave Davis MD

## 2018-10-22 ENCOUNTER — HOSPITAL ENCOUNTER (EMERGENCY)
Facility: CLINIC | Age: 28
Discharge: HOME | End: 2018-10-23
Attending: INTERNAL MEDICINE | Admitting: INTERNAL MEDICINE
Payer: COMMERCIAL

## 2018-10-22 DIAGNOSIS — R25.2 CRAMPS, MUSCLE, GENERAL: ICD-10-CM

## 2018-10-22 DIAGNOSIS — R19.7 DIARRHEA, UNSPECIFIED TYPE: ICD-10-CM

## 2018-10-22 LAB
BASOPHILS # BLD AUTO: 0 10E9/L (ref 0–0.2)
BASOPHILS NFR BLD AUTO: 0.4 %
DIFFERENTIAL METHOD BLD: ABNORMAL
EOSINOPHIL # BLD AUTO: 0.2 10E9/L (ref 0–0.7)
EOSINOPHIL NFR BLD AUTO: 1.8 %
ERYTHROCYTE [DISTWIDTH] IN BLOOD BY AUTOMATED COUNT: 15.2 % (ref 10–15)
HCT VFR BLD AUTO: 40.9 % (ref 35–47)
HGB BLD-MCNC: 12.8 G/DL (ref 11.7–15.7)
IMM GRANULOCYTES # BLD: 0.1 10E9/L (ref 0–0.4)
IMM GRANULOCYTES NFR BLD: 0.7 %
LYMPHOCYTES # BLD AUTO: 2.5 10E9/L (ref 0.8–5.3)
LYMPHOCYTES NFR BLD AUTO: 25.6 %
MCH RBC QN AUTO: 28 PG (ref 26.5–33)
MCHC RBC AUTO-ENTMCNC: 31.3 G/DL (ref 31.5–36.5)
MCV RBC AUTO: 90 FL (ref 78–100)
MONOCYTES # BLD AUTO: 0.7 10E9/L (ref 0–1.3)
MONOCYTES NFR BLD AUTO: 6.6 %
NEUTROPHILS # BLD AUTO: 6.4 10E9/L (ref 1.6–8.3)
NEUTROPHILS NFR BLD AUTO: 64.9 %
NRBC # BLD AUTO: 0 10*3/UL
NRBC BLD AUTO-RTO: 0 /100
PLATELET # BLD AUTO: 306 10E9/L (ref 150–450)
RBC # BLD AUTO: 4.57 10E12/L (ref 3.8–5.2)
WBC # BLD AUTO: 9.8 10E9/L (ref 4–11)

## 2018-10-22 PROCEDURE — 81001 URINALYSIS AUTO W/SCOPE: CPT | Performed by: INTERNAL MEDICINE

## 2018-10-22 PROCEDURE — 93005 ELECTROCARDIOGRAM TRACING: CPT

## 2018-10-22 PROCEDURE — 83735 ASSAY OF MAGNESIUM: CPT | Performed by: INTERNAL MEDICINE

## 2018-10-22 PROCEDURE — 84484 ASSAY OF TROPONIN QUANT: CPT | Performed by: INTERNAL MEDICINE

## 2018-10-22 PROCEDURE — 85025 COMPLETE CBC W/AUTO DIFF WBC: CPT | Performed by: INTERNAL MEDICINE

## 2018-10-22 PROCEDURE — 99284 EMERGENCY DEPT VISIT MOD MDM: CPT | Mod: 25

## 2018-10-22 PROCEDURE — 96360 HYDRATION IV INFUSION INIT: CPT

## 2018-10-22 PROCEDURE — 80053 COMPREHEN METABOLIC PANEL: CPT | Performed by: INTERNAL MEDICINE

## 2018-10-22 PROCEDURE — 25000128 H RX IP 250 OP 636: Performed by: INTERNAL MEDICINE

## 2018-10-22 RX ORDER — SODIUM CHLORIDE, SODIUM LACTATE, POTASSIUM CHLORIDE, CALCIUM CHLORIDE 600; 310; 30; 20 MG/100ML; MG/100ML; MG/100ML; MG/100ML
1000 INJECTION, SOLUTION INTRAVENOUS CONTINUOUS
Status: DISCONTINUED | OUTPATIENT
Start: 2018-10-22 | End: 2018-10-23 | Stop reason: HOSPADM

## 2018-10-22 RX ADMIN — SODIUM CHLORIDE, POTASSIUM CHLORIDE, SODIUM LACTATE AND CALCIUM CHLORIDE 1000 ML: 600; 310; 30; 20 INJECTION, SOLUTION INTRAVENOUS at 23:49

## 2018-10-22 ASSESSMENT — ENCOUNTER SYMPTOMS
PALPITATIONS: 1
VOMITING: 1
FEVER: 0
ARTHRALGIAS: 1
NAUSEA: 1
DIARRHEA: 1
ABDOMINAL PAIN: 0
SHORTNESS OF BREATH: 1
BLOOD IN STOOL: 0

## 2018-10-22 NOTE — ED AVS SNAPSHOT
[facility] Emergency Department    [address] [place] [address]    Phone:  [phone]    Fax:  [phone]                                       Olga Sheehan   MRN: [id number]    Department:  [facility] Emergency Department   Date of Visit:  10/22/2018           Patient Information     Date Of Birth          1990        Your diagnoses for this visit were:     Diarrhea, unspecified type     Cramps, muscle, general        You were seen by Anjelica Farrar MD.        Discharge Instructions       Take imodium per package directions  Use an antacid (Maalox, Mylanta, or generic) as a source of calcium and magnesium    Discharge Instructions  Adult Diarrhea    You have been seen today for diarrhea. This is usually caused by a virus, but some bacteria, parasites, medicines or other medical conditions can cause similar symptoms. At this time your doctor does not find that your diarrhea is a sign of anything dangerous or life-threatening. However, sometimes the signs of serious illness do not show up right away. If you have new or worse symptoms, you may need to be seen again in the Emergency Department or by your primary doctor.     Return to the Emergency Department if:    You feel you are getting dehydrated, such as being very thirsty, not urinating at least every 8-12 hours, or feeling faint or lightheaded.     You develop a new fever, or your fever continues for more than 2 days.     You have belly pain that seems worse than cramps, is in one spot, or is getting worse over time.     You have blood in your stool or your stool becomes black.  (Remember that if you take Pepto-Bismol , this will turn your stool black).     You feel very weak.    You are not starting to improve within 24 hours of your visit here.    What can I do to help myself?    The most important thing to do is to drink clear liquids.   It is best to have only small, frequent sips of liquids.  "Drinking too much at once may cause more diarrhea. You should also replace minerals, sodium and potassium lost with diarrhea. Pedialyte  and sports drinks can help you replace these minerals. You can also drink clear liquids such as water, weak tea, apple juice, and 7-Up . Avoid acid liquids (orange), caffeine (coffee) or alcohol. Milk products will make the diarrhea worse.     Eat only bland foods. Soda crackers, toast, plain noodles, gelatin, applesauce and bananas are good first choices. Avoid foods that have acid, are spicy, fatty or fibrous (such as meats, coarse grains, vegetables). You may start eating these foods again in about 3 days when you are better.     Sometimes treatment includes prescription medicine to prevent diarrhea. If your doctor prescribes these for you, take them as directed.     Nonprescription medicine is available for the treatment of diarrhea and can be very effective. If you use it, make sure you use the dose recommended on the package. Check with your healthcare provider before you use any medicine for diarrhea.     Don t take ibuprofen, or other nonsteroidal anti-inflammatory medicines without checking with your healthcare provider.   Probiotics: If you have been given an antibiotic, you may want to also take a probiotic pill or eat yogurt with live cultures. Probiotics have \"good bacteria\" to help your intestines stay healthy. Studies have shown that probiotics help prevent diarrhea and other intestine problems (including C. diff infection) when you take antibiotics. You can buy these without a prescription in the pharmacy section of the store.   If you were given a prescription for medicine here today, be sure to read all of the information (including the package insert) that comes with your prescription.  This will include important information about the medicine, its side effects, and any warnings that you need to know about.  The pharmacist who fills the prescription can provide " more information and answer questions you may have about the medicine.  If you have questions or concerns that the pharmacist cannot address, please call or return to the Emergency Department.     Remember that you can always come back to the Emergency Department if you are not able to see your regular doctor in the amount of time listed above, if you get any new symptoms, or if there is anything that worries you.      Discharge References/Attachments     DIET, HIGH-POTASSIUM (ENGLISH)      24 Hour Appointment Hotline       To make an appointment at any [facility], call [phone] ([phone]). If you don't have a family doctor or clinic, we will help you find one. [place] clinics are conveniently located to serve the needs of you and your family.             Review of your medicines      Our records show that you are taking the medicines listed below. If these are incorrect, please call your family doctor or clinic.        Dose / Directions Last dose taken    ALBUTEROL INHALER 17GM        None Entered   Refills:  0        ibuprofen 600 MG tablet   Commonly known as:  ADVIL/MOTRIN   Dose:  600 mg   Quantity:  30 tablet        Take 1 tablet (600 mg) by mouth every 8 hours as needed for moderate pain   Refills:  0        LATUDA PO        Refills:  0        lidocaine 2 % topical gel   Commonly known as:  XYLOCAINE   Quantity:  30 mL        Apply topically as needed for moderate pain Apply to anus qid as needed for pain   Refills:  3        ondansetron 4 MG ODT tab   Commonly known as:  ZOFRAN ODT   Dose:  4 mg   Quantity:  10 tablet        Take 1 tablet by mouth every 6 hours as needed for nausea.   Refills:  0        ondansetron 4 MG tablet   Commonly known as:  ZOFRAN   Dose:  4 mg   Quantity:  6 tablet        Take 1 tablet by mouth every 8 hours as needed for nausea.   Refills:  0        * oxyCODONE IR 5 MG tablet   Commonly known as:  ROXICODONE   Dose:  5-10 mg   Quantity:  15 tablet        Take 1-2  tablets (5-10 mg) by mouth every 6 hours as needed for pain   Refills:  0        * oxyCODONE IR 5 MG tablet   Commonly known as:  ROXICODONE   Dose:  5 mg   Quantity:  12 tablet        Take 1 tablet (5 mg) by mouth every 6 hours as needed for pain   Refills:  0        * oxyCODONE IR 5 MG tablet   Commonly known as:  ROXICODONE   Dose:  5-10 mg   Quantity:  13 tablet        Take 1-2 tablets (5-10 mg) by mouth every 3 hours as needed for pain or other (Moderate to Severe)   Refills:  0        polyethylene glycol powder   Commonly known as:  MIRALAX   Dose:  1 capful   Quantity:  510 g        Take 17 g (1 capful) by mouth daily Mix with 8 ounces of water or other liquid   Refills:  1        * Notice:  This list has 3 medication(s) that are the same as other medications prescribed for you. Read the directions carefully, and ask your doctor or other care provider to review them with you.            Procedures and tests performed during your visit     CBC with platelets differential    Comprehensive metabolic panel    EKG 12-lead, tracing only    Magnesium    Troponin I    UA with Microscopic reflex to Culture      Orders Needing Specimen Collection     Ordered          10/22/18 2330  UA with Microscopic reflex to Culture - STAT, [facility]: STAT, Final result     Scheduled Task Status   10/22/18 2331 [facility] Reminder: Open   Order Class:  PCU Collect                  Pending Results     No orders found for last 3 day(s).            Pending Culture Results     No orders found for last 3 day(s).            Pending Results Instructions     If you had any lab results that were not finalized at the time of your Discharge, you can call the ED Lab Result RN at [phone]. You will be contacted by this team for any positive Lab results or changes in treatment. The nurses are available 7 days a week from 10A to 6:30P.  You can leave a message 24 hours per day and they will return your call.        Test Results From Your Hospital  Stay        10/22/2018 11:56 PM      Component Results     Component Value Ref Range & Units Status    WBC 9.8 4.0 - 11.0 10e9/L Final    RBC Count 4.57 3.8 - 5.2 10e12/L Final    Hemoglobin 12.8 11.7 - 15.7 g/dL Final    Hematocrit 40.9 35.0 - 47.0 % Final    MCV 90 78 - 100 fl Final    MCH 28.0 26.5 - 33.0 pg Final    MCHC 31.3 (L) 31.5 - 36.5 g/dL Final    RDW 15.2 (H) 10.0 - 15.0 % Final    Platelet Count 306 150 - 450 10e9/L Final    Diff Method Automated Method  Final    % Neutrophils 64.9 % Final    % Lymphocytes 25.6 % Final    % Monocytes 6.6 % Final    % Eosinophils 1.8 % Final    % Basophils 0.4 % Final    % Immature Granulocytes 0.7 % Final    Nucleated RBCs 0 0 /100 Final    Absolute Neutrophil 6.4 1.6 - 8.3 10e9/L Final    Absolute Lymphocytes 2.5 0.8 - 5.3 10e9/L Final    Absolute Monocytes 0.7 0.0 - 1.3 10e9/L Final    Absolute Eosinophils 0.2 0.0 - 0.7 10e9/L Final    Absolute Basophils 0.0 0.0 - 0.2 10e9/L Final    Abs Immature Granulocytes 0.1 0 - 0.4 10e9/L Final    Absolute Nucleated RBC 0.0  Final         10/23/2018 12:18 AM      Component Results     Component Value Ref Range & Units Status    Sodium 140 133 - 144 mmol/L Final    Potassium 3.9 3.4 - 5.3 mmol/L Final    Chloride 109 94 - 109 mmol/L Final    Carbon Dioxide 26 20 - 32 mmol/L Final    Anion Gap 5 3 - 14 mmol/L Final    Glucose 113 (H) 70 - 99 mg/dL Final    Urea Nitrogen 11 7 - 30 mg/dL Final    Creatinine 0.83 0.52 - 1.04 mg/dL Final    GFR Estimate 80 >60 mL/min/1.7m2 Final    Non  GFR Calc    GFR Estimate If Black >90 >60 mL/min/1.7m2 Final    African American GFR Calc    Calcium 8.8 8.5 - 10.1 mg/dL Final    Bilirubin Total 0.3 0.2 - 1.3 mg/dL Final    Albumin 3.4 3.4 - 5.0 g/dL Final    Protein Total 7.7 6.8 - 8.8 g/dL Final    Alkaline Phosphatase 71 40 - 150 U/L Final    ALT 46 0 - 50 U/L Final    AST 18 0 - 45 U/L Final         10/23/2018 12:18 AM      Component Results     Component Value Ref Range & Units  Status    Troponin I ES <0.015 0.000 - 0.045 ug/L Final    The 99th percentile for upper reference range is 0.045 ug/L.  Troponin values   in the range of 0.045 - 0.120 ug/L may be associated with risks of adverse   clinical events.           10/23/2018 12:13 AM      Component Results     Component Value Ref Range & Units Status    Color Urine Yellow  Final    Appearance Urine Slightly Cloudy  Final    Glucose Urine Negative NEG^Negative mg/dL Final    Bilirubin Urine Negative NEG^Negative Final    Ketones Urine Negative NEG^Negative mg/dL Final    Specific Gravity Urine 1.016 1.003 - 1.035 Final    Blood Urine Negative NEG^Negative Final    pH Urine 5.0 5.0 - 7.0 pH Final    Protein Albumin Urine Negative NEG^Negative mg/dL Final    Urobilinogen mg/dL 0.0 0.0 - 2.0 mg/dL Final    Nitrite Urine Negative NEG^Negative Final    Leukocyte Esterase Urine Trace (A) NEG^Negative Final    Source Midstream Urine  Final    WBC Urine 6 (H) 0 - 5 /HPF Final    RBC Urine 2 0 - 2 /HPF Final    Squamous Epithelial /HPF Urine 6 (H) 0 - 1 /HPF Final    Mucous Urine Present (A) NEG^Negative /LPF Final         10/23/2018 12:18 AM      Component Results     Component Value Ref Range & Units Status    Magnesium 2.2 1.6 - 2.3 mg/dL Final                Clinical Quality Measure: Blood Pressure Screening     Your blood pressure was checked while you were in the emergency department today. The last reading we obtained was  BP: 115/53 . Please read the guidelines below about what these numbers mean and what you should do about them.  If your systolic blood pressure (the top number) is less than 120 and your diastolic blood pressure (the bottom number) is less than 80, then your blood pressure is normal. There is nothing more that you need to do about it.  If your systolic blood pressure (the top number) is 120-139 or your diastolic blood pressure (the bottom number) is 80-89, your blood pressure may be higher than it should be. You should  "have your blood pressure rechecked within a year by a primary care provider.  If your systolic blood pressure (the top number) is 140 or greater or your diastolic blood pressure (the bottom number) is 90 or greater, you may have high blood pressure. High blood pressure is treatable, but if left untreated over time it can put you at risk for heart attack, stroke, or kidney failure. You should have your blood pressure rechecked by a primary care provider within the next 4 weeks.  If your provider in the emergency department today gave you specific instructions to follow-up with your doctor or provider even sooner than that, you should follow that instruction and not wait for up to 4 weeks for your follow-up visit.        Thank you for choosing [facility]       Thank you for choosing [facility] for your care. Our goal is always to provide you with excellent care. Hearing back from our patients is one way we can continue to improve our services. Please take a few minutes to complete the written survey that you may receive in the mail after you visit with us. Thank you!        [facility] Information     [facility] lets you send messages to your doctor, view your test results, renew your prescriptions, schedule appointments and more. To sign up, go to [email or web address] . Click on \"Log in\" on the left side of the screen, which will take you to the Welcome page. Then click on \"Sign up Now\" on the right side of the page.     You will be asked to enter the access code listed below, as well as some personal information. Please follow the directions to create your username and password.     Your access code is: [email or web address]  Expires: 2019 12:33 AM     Your access code will  in 90 days. If you need help or a new code, please call your [facility] clinic or [phone].        Care EveryWhere ID     This is your Care EveryWhere ID. This could be used by other organizations to access your [facility] medical " records  [id number]        Equal Access to Services     BYRON MEDRANO : Cheng Ruiz, loreta freitas, zaire araujo. So [facility] [phone].    ATENCIÓN: Si habla español, tiene a tolbert disposición servicios gratuitos de asistencia lingüística. Llame al [phone].    We comply with applicable federal civil rights laws and [place] laws. We do not discriminate on the basis of race, color, national origin, age, disability, sex, sexual orientation, or gender identity.            After Visit Summary       This is your record. Keep this with you and show to your community pharmacist(s) and doctor(s) at your next visit.

## 2018-10-22 NOTE — ED AVS SNAPSHOT
[facility] Emergency Department    [address]    [facility] [address]    Phone:  [phone]    Fax:  [phone]                                       Olga Sheehan   MRN: [id number]    Department:  [facility] Emergency Department   Date of Visit:  10/22/2018           After Visit Summary Signature Page     I have received my discharge instructions, and my questions have been answered. I have discussed any challenges I see with this plan with the nurse or doctor.    ..........................................................................................................................................  Patient/Patient Representative Signature      ..........................................................................................................................................  Patient Representative Print Name and Relationship to Patient    ..................................................               ................................................  Date                                   Time    ..........................................................................................................................................  Reviewed by Signature/Title    ...................................................              ..............................................  Date                                               Time          22EPIC Rev 08/18

## 2018-10-23 VITALS
RESPIRATION RATE: 20 BRPM | SYSTOLIC BLOOD PRESSURE: 115 MMHG | WEIGHT: 293 LBS | TEMPERATURE: 97.6 F | OXYGEN SATURATION: 100 % | DIASTOLIC BLOOD PRESSURE: 53 MMHG | HEART RATE: 84 BPM | BODY MASS INDEX: 50.86 KG/M2

## 2018-10-23 LAB
ALBUMIN SERPL-MCNC: 3.4 G/DL (ref 3.4–5)
ALBUMIN UR-MCNC: NEGATIVE MG/DL
ALP SERPL-CCNC: 71 U/L (ref 40–150)
ALT SERPL W P-5'-P-CCNC: 46 U/L (ref 0–50)
ANION GAP SERPL CALCULATED.3IONS-SCNC: 5 MMOL/L (ref 3–14)
APPEARANCE UR: ABNORMAL
AST SERPL W P-5'-P-CCNC: 18 U/L (ref 0–45)
BILIRUB SERPL-MCNC: 0.3 MG/DL (ref 0.2–1.3)
BILIRUB UR QL STRIP: NEGATIVE
BUN SERPL-MCNC: 11 MG/DL (ref 7–30)
CALCIUM SERPL-MCNC: 8.8 MG/DL (ref 8.5–10.1)
CHLORIDE SERPL-SCNC: 109 MMOL/L (ref 94–109)
CO2 SERPL-SCNC: 26 MMOL/L (ref 20–32)
COLOR UR AUTO: YELLOW
CREAT SERPL-MCNC: 0.83 MG/DL (ref 0.52–1.04)
GFR SERPL CREATININE-BSD FRML MDRD: 80 ML/MIN/1.7M2
GLUCOSE SERPL-MCNC: 113 MG/DL (ref 70–99)
GLUCOSE UR STRIP-MCNC: NEGATIVE MG/DL
HGB UR QL STRIP: NEGATIVE
INTERPRETATION ECG - MUSE: NORMAL
KETONES UR STRIP-MCNC: NEGATIVE MG/DL
LEUKOCYTE ESTERASE UR QL STRIP: ABNORMAL
MAGNESIUM SERPL-MCNC: 2.2 MG/DL (ref 1.6–2.3)
MUCOUS THREADS #/AREA URNS LPF: PRESENT /LPF
NITRATE UR QL: NEGATIVE
PH UR STRIP: 5 PH (ref 5–7)
POTASSIUM SERPL-SCNC: 3.9 MMOL/L (ref 3.4–5.3)
PROT SERPL-MCNC: 7.7 G/DL (ref 6.8–8.8)
RBC #/AREA URNS AUTO: 2 /HPF (ref 0–2)
SODIUM SERPL-SCNC: 140 MMOL/L (ref 133–144)
SOURCE: ABNORMAL
SP GR UR STRIP: 1.02 (ref 1–1.03)
SQUAMOUS #/AREA URNS AUTO: 6 /HPF (ref 0–1)
TROPONIN I SERPL-MCNC: <0.015 UG/L (ref 0–0.04)
UROBILINOGEN UR STRIP-MCNC: 0 MG/DL (ref 0–2)
WBC #/AREA URNS AUTO: 6 /HPF (ref 0–5)

## 2018-10-23 NOTE — ED TRIAGE NOTES
Here with a 4 day history of palpitations and dehydrations . Says she thinks she got diarrhea about 4 days from \some crab she ate . Feels she got dehydrated from the diarrhea she had . Noted the palpitations off and on but not at the time of triage . Feels she has also been having muscle spasms . Has a mateus/anal abscess but says she is not having symptoms for this

## 2018-10-23 NOTE — ED NOTES
Pt provided with discharge paperwork and educated on recommended follow-up with PCP. Pt educated on how to manage symptoms appropriately at home. Pt voiced understanding and denied any questions at discharge.

## 2018-10-23 NOTE — DISCHARGE INSTRUCTIONS
Take imodium per package directions  Use an antacid (Maalox, Mylanta, or generic) as a source of calcium and magnesium    Discharge Instructions  Adult Diarrhea    You have been seen today for diarrhea. This is usually caused by a virus, but some bacteria, parasites, medicines or other medical conditions can cause similar symptoms. At this time your doctor does not find that your diarrhea is a sign of anything dangerous or life-threatening. However, sometimes the signs of serious illness do not show up right away. If you have new or worse symptoms, you may need to be seen again in the Emergency Department or by your primary doctor.     Return to the Emergency Department if:    You feel you are getting dehydrated, such as being very thirsty, not urinating at least every 8-12 hours, or feeling faint or lightheaded.     You develop a new fever, or your fever continues for more than 2 days.     You have belly pain that seems worse than cramps, is in one spot, or is getting worse over time.     You have blood in your stool or your stool becomes black.  (Remember that if you take Pepto-Bismol , this will turn your stool black).     You feel very weak.    You are not starting to improve within 24 hours of your visit here.    What can I do to help myself?    The most important thing to do is to drink clear liquids.   It is best to have only small, frequent sips of liquids. Drinking too much at once may cause more diarrhea. You should also replace minerals, sodium and potassium lost with diarrhea. Pedialyte  and sports drinks can help you replace these minerals. You can also drink clear liquids such as water, weak tea, apple juice, and 7-Up . Avoid acid liquids (orange), caffeine (coffee) or alcohol. Milk products will make the diarrhea worse.     Eat only bland foods. Soda crackers, toast, plain noodles, gelatin, applesauce and bananas are good first choices. Avoid foods that have acid, are spicy, fatty or fibrous (such as  "meats, coarse grains, vegetables). You may start eating these foods again in about 3 days when you are better.     Sometimes treatment includes prescription medicine to prevent diarrhea. If your doctor prescribes these for you, take them as directed.     Nonprescription medicine is available for the treatment of diarrhea and can be very effective. If you use it, make sure you use the dose recommended on the package. Check with your healthcare provider before you use any medicine for diarrhea.     Don t take ibuprofen, or other nonsteroidal anti-inflammatory medicines without checking with your healthcare provider.   Probiotics: If you have been given an antibiotic, you may want to also take a probiotic pill or eat yogurt with live cultures. Probiotics have \"good bacteria\" to help your intestines stay healthy. Studies have shown that probiotics help prevent diarrhea and other intestine problems (including C. diff infection) when you take antibiotics. You can buy these without a prescription in the pharmacy section of the store.   If you were given a prescription for medicine here today, be sure to read all of the information (including the package insert) that comes with your prescription.  This will include important information about the medicine, its side effects, and any warnings that you need to know about.  The pharmacist who fills the prescription can provide more information and answer questions you may have about the medicine.  If you have questions or concerns that the pharmacist cannot address, please call or return to the Emergency Department.     Remember that you can always come back to the Emergency Department if you are not able to see your regular doctor in the amount of time listed above, if you get any new symptoms, or if there is anything that worries you.    "

## 2018-10-23 NOTE — ED PROVIDER NOTES
History     Chief Complaint:  Palpitations and diarrhea    HPI   Olga Sheehan is a 28 year old female who presents with palpitations and diarrhea. The patient reports she ate crab 5 days ago and began feeling unwell after eating it. However developed severe non-bloody diarrhea the following day with associated nausea, non-bloody emesis, and muscle spasms throughout her body. Patient has been applying Salon Pas to the areas of muscle spasms to ease the pain. She also has been feeling dehydrated, and has been drinking water and Gatorade. She also has been experiencing palpitations, where her heart beat feels hard and fast with associated slight shortness of breath and pain across her chest. Of note, patient states she was diagnosed with a perianal abscess a couple of months ago and has had a procedure done on it. However she recently had a follow-up with her provider and there were concerns about a possible fistula that has developed. Denies fevers, abdominal pain, or any other symptoms at this time. Patient is a cigarette smoker. No history of prior cardiac disease.     CARDIAC RISK FACTORS:  Sex:    Female  Tobacco:   Yes  Hypertension:   No  Hyperlipidemia:  No  Diabetes:   No  Family History:  No    PE/DVT RISK FACTORS:  Sex:    Female  Hormones:   No  Tobacco:   Yes  Cancer:   No  Travel:   No  Surgery:   No  Other immobilization: No  Personal history:  No  Family history:  No     Allergies:  Ceclor  Lamictal  Penicillins     Medications:    Albuterol  Lurasidone    Past Medical History:    Asthma  Bipolar affective disorder  Obesity  Sleep apnea  PTSD  Fatty liver    Past Surgical History:    ENT surgery  I&D rectum    Family History:    History reviewed. No pertinent family history.      Social History:  Smoking status: Current some day smoker  Alcohol use: Yes    Marital Status:  Single [1]    Review of Systems   Constitutional: Negative for fever.   Respiratory: Positive for shortness of breath.     Cardiovascular: Positive for chest pain and palpitations.   Gastrointestinal: Positive for diarrhea, nausea and vomiting. Negative for abdominal pain and blood in stool.   Musculoskeletal: Positive for arthralgias (Muscle spasms).   All other systems reviewed and are negative.    Physical Exam   Patient Vitals for the past 24 hrs:   BP Temp Temp src Pulse Resp SpO2 Weight   10/22/18 2345 115/53 - - - - 98 % -   10/22/18 2233 (!) 164/123 97.6  F (36.4  C) Temporal 84 20 99 % 147.3 kg (324 lb 11.8 oz)      Physical Exam   Constitutional: She is cooperative.   HENT:   Right Ear: Tympanic membrane normal.   Left Ear: Tympanic membrane normal.   Mouth/Throat: Oropharynx is clear and moist and mucous membranes are normal.   Eyes: Conjunctivae are normal.   Neck: Normal range of motion.   Cardiovascular: Regular rhythm and normal heart sounds.    Pulmonary/Chest: Effort normal and breath sounds normal.   Abdominal: Soft. Normal appearance and bowel sounds are normal. There is no rebound and no guarding.   Musculoskeletal: Normal range of motion.   Lymphadenopathy:     She has no cervical adenopathy.   Neurological: She is alert.   Skin: Skin is warm and dry.   Psychiatric: She has a normal mood and affect.     Emergency Department Course   ECG (23:39:54)  Normal sinus rhythm. Normal ECG.    Agree with computer interpretation. No changes compared to EKG dated 6/28/18  Interpreted at 2344 by Anjelica Farrar MD.   Rate 78 bpm. NJ interval 138. QRS duration 90. QT/QTc 372/424. P-R-T axes 16 39 4.     Laboratory:  CBC: WNL (WBC 9.8, HGB 12.8, )   CMP: Glucose 113 (H) (Creatinine 0.83)   Troponin (2348): <0.015   UA: Leukocyte esterase Trace, WBC/HPF 6 (H), Squamous epithelial/HPF 6 (H), Mucous Present  Magnesium: 2.2    Interventions:  2349: Lactated ringers bolus 1L IV      Emergency Department Course:  Past medical records, nursing notes, and vitals reviewed.  2324: I performed an exam of the patient and  obtained history, as documented above.  2348: Peripheral IV established. Blood drawn for basic laboratory. Troponin obtained. Results as noted above.    2339: ECG obtained, findings as noted above.    Patient was given the above interventions while here in the emergency department.   0022: I rechecked the patient. Findings and plan explained to the Patient. Patient discharged home with instructions regarding supportive care, medications, and reasons to return. The importance of close follow-up was reviewed.       Impression & Plan    Medical Decision Making:  Olga Sheehan is a 28 year old female presents to the emergency department complaining of frequent diffuse muscle spasms in the context of diarrheal illness.  Laboratory tests are unremarkable without evidence of significant derangements of potassium, magnesium, or other electrolytes.  No evidence of significant dehydration.  With her history I am not highly suspicious of a bacterial cause of diarrhea.  I discussed with her the possibility that abdominal cramps may be causing hyperventilation with electrolyte shifts but she indicates that her muscle cramps do not seem connected in time to intestinal cramps.  I will have her continue plenty of fluids and electrolytes.  I suggested the Imodium to decrease diarrhea and improve electrolyte absorption and using an oral antacid to replace calcium and magnesium salts.  Return if worse or new symptoms or not improved within 1-2 days.    Diagnosis:    ICD-10-CM   1. Diarrhea, unspecified type R19.7   2. Cramps, muscle, general R25.2     Disposition:  discharged to home    Starla Ding  10/22/2018   [facility] EMERGENCY DEPARTMENT  I, Starla Ding, am serving as a scribe at 11:24 PM on 10/22/2018 to document services personally performed by Anjelica Farrar MD based on my observations and the provider's statements to me.       Anjelica Farrar MD  10/23/18 1575

## 2018-11-07 ENCOUNTER — APPOINTMENT (OUTPATIENT)
Dept: CT IMAGING | Facility: CLINIC | Age: 28
End: 2018-11-07
Attending: EMERGENCY MEDICINE
Payer: COMMERCIAL

## 2018-11-07 ENCOUNTER — HOSPITAL ENCOUNTER (EMERGENCY)
Facility: CLINIC | Age: 28
Discharge: HOME | End: 2018-11-08
Attending: EMERGENCY MEDICINE | Admitting: EMERGENCY MEDICINE
Payer: COMMERCIAL

## 2018-11-07 VITALS
TEMPERATURE: 98 F | SYSTOLIC BLOOD PRESSURE: 117 MMHG | DIASTOLIC BLOOD PRESSURE: 74 MMHG | RESPIRATION RATE: 18 BRPM | OXYGEN SATURATION: 96 % | HEART RATE: 88 BPM

## 2018-11-07 DIAGNOSIS — K62.89 PERIANAL PAIN: ICD-10-CM

## 2018-11-07 DIAGNOSIS — K60.40: ICD-10-CM

## 2018-11-07 LAB
ANION GAP SERPL CALCULATED.3IONS-SCNC: 7 MMOL/L (ref 3–14)
BASOPHILS # BLD AUTO: 0.1 10E9/L (ref 0–0.2)
BASOPHILS NFR BLD AUTO: 0.5 %
BUN SERPL-MCNC: 11 MG/DL (ref 7–30)
CALCIUM SERPL-MCNC: 8.7 MG/DL (ref 8.5–10.1)
CHLORIDE SERPL-SCNC: 107 MMOL/L (ref 94–109)
CO2 SERPL-SCNC: 25 MMOL/L (ref 20–32)
CREAT SERPL-MCNC: 0.81 MG/DL (ref 0.52–1.04)
DIFFERENTIAL METHOD BLD: ABNORMAL
EOSINOPHIL # BLD AUTO: 0.2 10E9/L (ref 0–0.7)
EOSINOPHIL NFR BLD AUTO: 1.3 %
ERYTHROCYTE [DISTWIDTH] IN BLOOD BY AUTOMATED COUNT: 15 % (ref 10–15)
GFR SERPL CREATININE-BSD FRML MDRD: 83 ML/MIN/1.7M2
GLUCOSE SERPL-MCNC: 97 MG/DL (ref 70–99)
HCT VFR BLD AUTO: 41.4 % (ref 35–47)
HGB BLD-MCNC: 13.1 G/DL (ref 11.7–15.7)
IMM GRANULOCYTES # BLD: 0.1 10E9/L (ref 0–0.4)
IMM GRANULOCYTES NFR BLD: 0.6 %
LYMPHOCYTES # BLD AUTO: 2.7 10E9/L (ref 0.8–5.3)
LYMPHOCYTES NFR BLD AUTO: 21.8 %
MCH RBC QN AUTO: 28 PG (ref 26.5–33)
MCHC RBC AUTO-ENTMCNC: 31.6 G/DL (ref 31.5–36.5)
MCV RBC AUTO: 89 FL (ref 78–100)
MONOCYTES # BLD AUTO: 0.8 10E9/L (ref 0–1.3)
MONOCYTES NFR BLD AUTO: 6.6 %
NEUTROPHILS # BLD AUTO: 8.6 10E9/L (ref 1.6–8.3)
NEUTROPHILS NFR BLD AUTO: 69.2 %
NRBC # BLD AUTO: 0 10*3/UL
NRBC BLD AUTO-RTO: 0 /100
PLATELET # BLD AUTO: 342 10E9/L (ref 150–450)
POTASSIUM SERPL-SCNC: 3.7 MMOL/L (ref 3.4–5.3)
RBC # BLD AUTO: 4.68 10E12/L (ref 3.8–5.2)
SODIUM SERPL-SCNC: 139 MMOL/L (ref 133–144)
WBC # BLD AUTO: 12.5 10E9/L (ref 4–11)

## 2018-11-07 PROCEDURE — 99285 EMERGENCY DEPT VISIT HI MDM: CPT | Mod: 25

## 2018-11-07 PROCEDURE — 85025 COMPLETE CBC W/AUTO DIFF WBC: CPT | Performed by: EMERGENCY MEDICINE

## 2018-11-07 PROCEDURE — 96375 TX/PRO/DX INJ NEW DRUG ADDON: CPT | Mod: 59

## 2018-11-07 PROCEDURE — 80048 BASIC METABOLIC PNL TOTAL CA: CPT | Performed by: EMERGENCY MEDICINE

## 2018-11-07 PROCEDURE — 72193 CT PELVIS W/DYE: CPT

## 2018-11-07 PROCEDURE — 25000128 H RX IP 250 OP 636: Performed by: EMERGENCY MEDICINE

## 2018-11-07 PROCEDURE — 96374 THER/PROPH/DIAG INJ IV PUSH: CPT | Mod: 59

## 2018-11-07 RX ORDER — IOPAMIDOL 755 MG/ML
500 INJECTION, SOLUTION INTRAVASCULAR ONCE
Status: COMPLETED | OUTPATIENT
Start: 2018-11-07 | End: 2018-11-07

## 2018-11-07 RX ORDER — ONDANSETRON 2 MG/ML
4 INJECTION INTRAMUSCULAR; INTRAVENOUS ONCE
Status: COMPLETED | OUTPATIENT
Start: 2018-11-07 | End: 2018-11-07

## 2018-11-07 RX ORDER — KETOROLAC TROMETHAMINE 15 MG/ML
15 INJECTION, SOLUTION INTRAMUSCULAR; INTRAVENOUS ONCE
Status: COMPLETED | OUTPATIENT
Start: 2018-11-07 | End: 2018-11-07

## 2018-11-07 RX ADMIN — KETOROLAC TROMETHAMINE 15 MG: 15 INJECTION, SOLUTION INTRAMUSCULAR; INTRAVENOUS at 23:02

## 2018-11-07 RX ADMIN — IOPAMIDOL 100 ML: 755 INJECTION, SOLUTION INTRAVENOUS at 23:33

## 2018-11-07 RX ADMIN — SODIUM CHLORIDE 65 ML: 9 INJECTION, SOLUTION INTRAVENOUS at 23:33

## 2018-11-07 RX ADMIN — ONDANSETRON 4 MG: 2 INJECTION INTRAMUSCULAR; INTRAVENOUS at 23:09

## 2018-11-07 RX ADMIN — HYDROMORPHONE HYDROCHLORIDE 1 MG: 1 INJECTION, SOLUTION INTRAMUSCULAR; INTRAVENOUS; SUBCUTANEOUS at 23:01

## 2018-11-07 NOTE — ED AVS SNAPSHOT
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        Discharge Instructions       Please make an appointment to follow up with [facility] and Rectal Surgery [phone] as already planned    Return to ER immediately if you develop: worsening pain, swelling, redness, Fever > 101, persistent nausea or vomiting OR you have any other concerns about your health.    Use tylenol and/or ibuprofen for pain or discomfort    Use Oxycodone for severe pain uncontrolled by above medications    Opioid Medication Information  You have been given a prescription for an opioid (narcotic) pain medicine and/or have received a pain medicine while here in the emergency department. These medicines can make you drowsy or impaired. You must not drive, operate dangerous equipment, or engage in any other dangerous activities while taking these medications. If you drive while taking these medications, you could be arrested for DUI, or driving under the influence. Do not drink any alcohol while you are taking these medications.   Opioid pain medications can cause addiction. If you have a history of chemical dependency of any type, you are at a higher risk of becoming addicted to pain medications. Only take these prescribed medications to treat your pain when all other options have been tried. Take it for as short a time and as few doses as possible. Store your pain pills in a secure place, as they are frequently stolen and provide a dangerous opportunity for children or visitors in your  house to start abusing these powerful medications. We will not replace any lost or stolen medicine. As soon as your pain is better, you should flush all your remaining medication.   Many prescription pain medications contain Tylenol (acetaminophen), including Vicodin, Tylenol #3, Norco, Lortab, and Percocet. You should not take any extra pills of Tylenol if you are using these prescription medications or you can get very sick. Do not ever take more than 4000 mg of acetaminophen in any 24 hour period.  All opioids tend to cause constipation. Drink plenty of water and eat foods that have a lot of fiber, such as fruits, vegetables, prune juice, apple juice and high fiber cereal. Take a laxative if you don t move your bowels at least every other day. Miralax, Milk of Magnesia, Colace, or Senna can be used to keep you regular.            Perianal Abscess, Antibiotic Treatment  Glands near the anus can become blocked. This can lead to infection. If the infection can't drain, a collection of pus called an abscess may form. Symptoms of an abscess include anal or rectal pain, itching, swelling, and fever. Frequently the abscess results in a fistula, which is an abnormal connection between the abscess and the skin where pus drains. A fistula may sometimes be seen on exam and may require other testing and treatments.  Your healthcare provider will likely drain the abscess. In some cases, he or she will also prescribe antibiotics. People with artificial valves, diabetes, weak immune systems, and certain other conditions always need antibiotics.  Home care    Abscesses are almost always drained. Follow any instructions from your provider about care of the incision site.      If you are prescribed antibiotics, take them exactly as prescribed. Take all of the antibiotic medicine as prescribed. Continue it even if you start feeling better. Finish all of the medicine unless your healthcare provider tells you to stop.    Try bro  baths. Sit in a tub filled with about 6 inches of hot water for 15 to 30 minutes. Test the water temperature before sitting down to ensure it will not burn you. Repeat this twice a day until pain is relieved.    Unless a pain medicine has been prescribed, you may take an over-the-counter medicine, such as ibuprofen, for pain.     Passing stools may be painful. If so, ask your healthcare provider about using a stool-softener for a short time. Gradually adding fiber to your diet, or taking a fiber supplement, is also helpful.  Follow-up care  Follow up with your doctor, or as advised.  When to seek medical advice  Call your healthcare provider right away if any of the following occur:    Increasing pain, swelling, or redness    Pus draining from the abscess    Fever of 100.4 F (38 C) or higher that continues for a day after starting antibiotics, or as directed by your healthcare provider    Other symptoms such as rectal bleeding, abdominal pain, or chronic diarrhea. Your provider will evaluate whether the abscess may indicate other medical conditions.  Date Last Reviewed: 3/1/2018    9613-3764 [employer]. [address] [place] [address]. All rights reserved. This information is not intended as a substitute for professional medical care. Always follow your healthcare professional's instructions.                 Discharge References/Attachments     PERIANAL ABSCESS (ANTIBIOTICS ONLY) (ENGLISH)      24 Hour Appointment Hotline       To make an appointment at any [facility] clinic, call [phone] ([phone]). If you don't have a family doctor or clinic, we will help you find one. [facility] clinics are conveniently located to serve the needs of you and your family.             Review of your medicines      START taking        Dose / Directions Last dose taken    ciprofloxacin 500 MG tablet   Commonly known as:  CIPRO   Dose:  500 mg   Quantity:  14 tablet        Take 1 tablet (500 mg) by  mouth 2 times daily for 7 days   Refills:  0        metroNIDAZOLE 500 MG tablet   Commonly known as:  FLAGYL   Dose:  500 mg   Quantity:  21 tablet        Take 1 tablet (500 mg) by mouth 3 times daily for 7 days   Refills:  0        oxyCODONE IR 5 MG tablet   Commonly known as:  ROXICODONE   Dose:  5-10 mg   Quantity:  12 tablet        Take 1-2 tablets (5-10 mg) by mouth every 6 hours as needed for pain   Refills:  0          Our records show that you are taking the medicines listed below. If these are incorrect, please call your family doctor or clinic.        Dose / Directions Last dose taken    ALBUTEROL INHALER 17GM        None Entered   Refills:  0        ibuprofen 600 MG tablet   Commonly known as:  ADVIL/MOTRIN   Dose:  600 mg   Quantity:  30 tablet        Take 1 tablet (600 mg) by mouth every 8 hours as needed for moderate pain   Refills:  0        LATUDA PO        Refills:  0        lidocaine 2 % topical gel   Commonly known as:  XYLOCAINE   Quantity:  30 mL        Apply topically as needed for moderate pain Apply to anus qid as needed for pain   Refills:  3        ondansetron 4 MG ODT tab   Commonly known as:  ZOFRAN ODT   Dose:  4 mg   Quantity:  10 tablet        Take 1 tablet by mouth every 6 hours as needed for nausea.   Refills:  0        ondansetron 4 MG tablet   Commonly known as:  ZOFRAN   Dose:  4 mg   Quantity:  6 tablet        Take 1 tablet by mouth every 8 hours as needed for nausea.   Refills:  0        polyethylene glycol powder   Commonly known as:  MIRALAX   Dose:  1 capful   Quantity:  510 g        Take 17 g (1 capful) by mouth daily Mix with 8 ounces of water or other liquid   Refills:  1                Information about OPIOIDS     PRESCRIPTION OPIOIDS: WHAT YOU NEED TO KNOW   We gave you an opioid (narcotic) pain medicine. It is important to manage your pain, but opioids are not always the best choice. You should first try all the other options your care team gave you. Take this medicine  for as short a time (and as few doses) as possible.    Some activities can increase your pain, such as bandage changes or therapy sessions. It may help to take your pain medicine 30 to 60 minutes before these activities. Reduce your stress by getting enough sleep, working on hobbies you enjoy and practicing relaxation or meditation. Talk to your care team about ways to manage your pain beyond prescription opioids.    These medicines have risks:    DO NOT drive when on new or higher doses of pain medicine. These medicines can affect your alertness and reaction times, and you could be arrested for driving under the influence (DUI). If you need to use opioids long-term, talk to your care team about driving.    DO NOT operate heavy machinery    DO NOT do any other dangerous activities while taking these medicines.    DO NOT drink any alcohol while taking these medicines.     If the opioid prescribed includes acetaminophen, DO NOT take with any other medicines that contain acetaminophen. Read all labels carefully. Look for the word  acetaminophen  or  Tylenol.  Ask your pharmacist if you have questions or are unsure.    You can get addicted to pain medicines, especially if you have a history of addiction (chemical, alcohol or substance dependence). Talk to your care team about ways to reduce this risk.    All opioids tend to cause constipation. Drink plenty of water and eat foods that have a lot of fiber, such as fruits, vegetables, prune juice, apple juice and high-fiber cereal. Take a laxative (Miralax, milk of magnesia, Colace, Senna) if you don t move your bowels at least every other day. Other side effects include upset stomach, sleepiness, dizziness, throwing up, tolerance (needing more of the medicine to have the same effect), physical dependence and slowed breathing.    Store your pills in a secure place, locked if possible. We will not replace any lost or stolen medicine. If you don t finish your medicine,  please throw away (dispose) as directed by your pharmacist. The [place] [employer] has more information about safe disposal: https://www.pca.state.[place]/living-green/managing-unwanted-medications        Prescriptions were sent or printed at these locations (3 Prescriptions)                   Other Prescriptions                Printed at Department/Unit printer (3 of 3)         ciprofloxacin (CIPRO) 500 MG tablet               metroNIDAZOLE (FLAGYL) 500 MG tablet               oxyCODONE IR (ROXICODONE) 5 MG tablet                Procedures and tests performed during your visit     Basic metabolic panel    CBC with platelets differential    CT Pelvis Soft Tissue w Contrast      Orders Needing Specimen Collection     Ordered          11/07/18 2214  Wound Culture Aerobic Bacterial - STAT, [facility]: STAT, Status: Sent     Scheduled Task Status   11/07/18 2215 [facility] Reminder: Open   Order Class:  PCU Collect                  Pending Results     Date and Time Order Name Status Description    11/7/2018 2236 CT Pelvis Soft Tissue w Contrast Preliminary             Pending Culture Results     No orders found for last 3 day(s).            Pending Results Instructions     If you had any lab results that were not finalized at the time of your Discharge, you can call the ED Lab Result RN at [phone]. You will be contacted by this team for any positive Lab results or changes in treatment. The nurses are available 7 days a week from 10A to 6:30P.  You can leave a message 24 hours per day and they will return your call.        Test Results From Your Hospital Stay        11/7/2018 11:07 PM      Component Results     Component Value Ref Range & Units Status    WBC 12.5 (H) 4.0 - 11.0 10e9/L Final    RBC Count 4.68 3.8 - 5.2 10e12/L Final    Hemoglobin 13.1 11.7 - 15.7 g/dL Final    Hematocrit 41.4 35.0 - 47.0 % Final    MCV 89 78 - 100 fl Final    MCH 28.0 26.5 - 33.0 pg Final    MCHC 31.6 31.5 - 36.5 g/dL Final     RDW 15.0 10.0 - 15.0 % Final    Platelet Count 342 150 - 450 10e9/L Final    Diff Method Automated Method  Final    % Neutrophils 69.2 % Final    % Lymphocytes 21.8 % Final    % Monocytes 6.6 % Final    % Eosinophils 1.3 % Final    % Basophils 0.5 % Final    % Immature Granulocytes 0.6 % Final    Nucleated RBCs 0 0 /100 Final    Absolute Neutrophil 8.6 (H) 1.6 - 8.3 10e9/L Final    Absolute Lymphocytes 2.7 0.8 - 5.3 10e9/L Final    Absolute Monocytes 0.8 0.0 - 1.3 10e9/L Final    Absolute Eosinophils 0.2 0.0 - 0.7 10e9/L Final    Absolute Basophils 0.1 0.0 - 0.2 10e9/L Final    Abs Immature Granulocytes 0.1 0 - 0.4 10e9/L Final    Absolute Nucleated RBC 0.0  Final         11/7/2018 11:22 PM      Component Results     Component Value Ref Range & Units Status    Sodium 139 133 - 144 mmol/L Final    Potassium 3.7 3.4 - 5.3 mmol/L Final    Chloride 107 94 - 109 mmol/L Final    Carbon Dioxide 25 20 - 32 mmol/L Final    Anion Gap 7 3 - 14 mmol/L Final    Glucose 97 70 - 99 mg/dL Final    Urea Nitrogen 11 7 - 30 mg/dL Final    Creatinine 0.81 0.52 - 1.04 mg/dL Final    GFR Estimate 83 >60 mL/min/1.7m2 Final    Non  GFR Calc    GFR Estimate If Black >90 >60 mL/min/1.7m2 Final    African American GFR Calc    Calcium 8.7 8.5 - 10.1 mg/dL Final         11/7/2018 11:46 PM      Narrative     CT ABDOMEN AND PELVIS WITH CONTRAST  11/7/2018 11:40 PM     HISTORY: History of ischiorectal abscess. Worsening pain similar to  prior abscess.    COMPARISON: 6/28/2018.    TECHNIQUE: Following the uneventful administration of 100 mL  Isovue-370 intravenous contrast, helical sections were acquired from  the iliac crests through the pubic symphysis. Coronal reconstructions  were generated. Radiation dose for this scan was reduced using  automated exposure control, adjustment of the mA and/or kV according  to the patient's size, or iterative reconstruction technique.    FINDINGS: The visualized portions of the small and  large bowel are  normal in caliber. The uterus is present. No enlarged lymph nodes or  free fluid in the pelvis. No convincing perirectal or perineal fluid  collection.        Impression     IMPRESSION: No visualized perirectal abscess.                Clinical Quality Measure: Blood Pressure Screening     Your blood pressure was checked while you were in the emergency department today. The last reading we obtained was  BP: 117/74 . Please read the guidelines below about what these numbers mean and what you should do about them.  If your systolic blood pressure (the top number) is less than 120 and your diastolic blood pressure (the bottom number) is less than 80, then your blood pressure is normal. There is nothing more that you need to do about it.  If your systolic blood pressure (the top number) is 120-139 or your diastolic blood pressure (the bottom number) is 80-89, your blood pressure may be higher than it should be. You should have your blood pressure rechecked within a year by a primary care provider.  If your systolic blood pressure (the top number) is 140 or greater or your diastolic blood pressure (the bottom number) is 90 or greater, you may have high blood pressure. High blood pressure is treatable, but if left untreated over time it can put you at risk for heart attack, stroke, or kidney failure. You should have your blood pressure rechecked by a primary care provider within the next 4 weeks.  If your provider in the emergency department today gave you specific instructions to follow-up with your doctor or provider even sooner than that, you should follow that instruction and not wait for up to 4 weeks for your follow-up visit.        Thank you for choosing [facility]       Thank you for choosing [facility] for your care. Our goal is always to provide you with excellent care. Hearing back from our patients is one way we can continue to improve our services. Please take a few minutes to complete the written  "survey that you may receive in the mail after you visit with us. Thank you!        [employer] Information     [employer] lets you send messages to your doctor, view your test results, renew your prescriptions, schedule appointments and more. To sign up, go to [email or web address] . Click on \"Log in\" on the left side of the screen, which will take you to the Welcome page. Then click on \"Sign up Now\" on the right side of the page.     You will be asked to enter the access code listed below, as well as some personal information. Please follow the directions to create your username and password.     Your access code is: [email or web address]  Expires: 2019 11:33 PM     Your access code will  in 90 days. If you need help or a new code, please call your [facility] clinic or [phone].        Care EveryWhere ID     This is your Care EveryWhere ID. This could be used by other organizations to access your [facility] medical records  [id number]        Equal Access to Services     CAMPOS MEDRANO : Hadalex king Sosoham, waaxda luqadaha, qaybta kaalmasylvester madsen, zaire singh . So [facility] [phone].    ATENCIÓN: Si habla español, tiene a tolbert disposición servicios gratuitos de asistencia lingüística. Llame al [phone].    We comply with applicable federal civil rights laws and [place] laws. We do not discriminate on the basis of race, color, national origin, age, disability, sex, sexual orientation, or gender identity.            After Visit Summary       This is your record. Keep this with you and show to your community pharmacist(s) and doctor(s) at your next visit.                  "

## 2018-11-07 NOTE — ED AVS SNAPSHOT
[facility] Emergency Department    [address]    [facility] [address]    Phone:  [phone]    Fax:  [phone]                                       Olga Sheehan   MRN: [id number]    Department:  [facility] Emergency Department   Date of Visit:  11/7/2018           After Visit Summary Signature Page     I have received my discharge instructions, and my questions have been answered. I have discussed any challenges I see with this plan with the nurse or doctor.    ..........................................................................................................................................  Patient/Patient Representative Signature      ..........................................................................................................................................  Patient Representative Print Name and Relationship to Patient    ..................................................               ................................................  Date                                   Time    ..........................................................................................................................................  Reviewed by Signature/Title    ...................................................              ..............................................  Date                                               Time          22EPIC Rev 08/18

## 2018-11-08 RX ORDER — CIPROFLOXACIN 500 MG/1
500 TABLET, FILM COATED ORAL 2 TIMES DAILY
Qty: 14 TABLET | Refills: 0 | Status: SHIPPED | OUTPATIENT
Start: 2018-11-08 | End: 2019-03-19

## 2018-11-08 RX ORDER — OXYCODONE HYDROCHLORIDE 5 MG/1
5-10 TABLET ORAL EVERY 6 HOURS PRN
Qty: 12 TABLET | Refills: 0 | Status: SHIPPED | OUTPATIENT
Start: 2018-11-08 | End: 2019-03-19

## 2018-11-08 RX ORDER — METRONIDAZOLE 500 MG/1
500 TABLET ORAL 3 TIMES DAILY
Qty: 21 TABLET | Refills: 0 | Status: SHIPPED | OUTPATIENT
Start: 2018-11-08 | End: 2019-03-19

## 2018-11-08 ASSESSMENT — ENCOUNTER SYMPTOMS
FEVER: 0
WOUND: 1

## 2018-11-08 NOTE — ED PROVIDER NOTES
History     Chief Complaint:  Wound Check      HPI   Olga Sheehan is a 28 year old female who presents with an abscess which is similar to her previous perirectal abscess. The patient reports that she has been draining intermittently from the wound. Additionally, the patient reports that her roommate who is a nurse had a look at the wound and noticed some redness surrounding the area. The patient notes that she was seen by Colorectal about a week ago and there was no redness reported at that time so this redness is new. She reports that her doctors expressed concern that she might be having a fistula in the area. She has also experienced some constant pain in the area with intermittent, intense surges in pain. She reports that she has an appointment with Dr. Acosta of Colorectal this upcoming Tuesday. The patient denies any fevers, black or bloody stools. She has been having normal bowel movements. The patient denies change of pregnancy.     Allergies:  Ceclor  Lamictal  Penicillins       Medications:    Albuterol  Lurasidone     Past Medical History:    Asthma  Bipolar affective disorder  Obesity  Sleep apnea  PTSD  Fatty liver     Past Surgical History:    ENT surgery  I&D rectum     Family History:    History reviewed. No pertinent family history.       Social History:  Smoking status: Current some day smoker  Alcohol use: Yes    Marital Status:  Single [1]        Review of Systems   Constitutional: Negative for fever.   Skin: Positive for wound.   All other systems reviewed and are negative.    Physical Exam     Vital signs    Patient Vitals for the past 24 hrs:   BP Temp Temp src Pulse Heart Rate Resp SpO2   11/07/18 2345 - - - - - - 96 %   11/07/18 2344 - - - - - - 99 %   11/07/18 2343 117/74 - - - - - -   11/07/18 2330 - - - - - - 97 %   11/07/18 2315 - - - - - - 95 %   11/07/18 2230 - - - - - - 97 %   11/07/18 2215 113/61 - - - - - 98 %   11/07/18 2122 (!) 174/115 98  F (36.7  C) Temporal 88 88 18 99 %             Physical Exam   HENT:   Right Ear: External ear normal.   Left Ear: External ear normal.   Nose: Nose normal.   Eyes: Right eye exhibits no discharge. Left eye exhibits no discharge. No scleral icterus.   Cardiovascular: Intact distal pulses.    Pulmonary/Chest: Effort normal.   Speaking in full sentences   Genitourinary:         Musculoskeletal:   No peripheral edema    Neurological:   Alert    No gross motor or sensory deficits   Skin: Skin is warm.   Psychiatric: She has a normal mood and affect. Judgment normal.   Nursing note and vitals reviewed.        Emergency Department Course   Imaging:  CT Pelvis Soft Tissue w Contrast   Preliminary Result   IMPRESSION: No visualized perirectal abscess.          I communicated the results of the imaging studies with the patient who expressed understanding of these findings.      Laboratory:  CBC: WBC 12.5, Absolute Neutrophil 8.6, otherwise within normal limits   BMP: WNL    Interventions:  At 2300, Pt received Dilaudid 1mg, Toradol 10mg, Zofran 4mg IV    Responded well to medications.     Emergency Department Course:  Past medical records, nursing notes, and vitals reviewed.  I performed an exam of the patient and obtained history, as documented above.       IV inserted and blood drawn for the above work up to be conducted.     The patient was sent for a CT Pelvic while in the emergency department, findings above.     Rechecked the patient, findings and plan explained to the patient. Patient discharged home, status improved, with instructions regarding supportive care, medications, and reasons to return as well as the importance of close follow-up was reviewed.    Impression & Plan    Medical Decision Makin-year-old female with a history of previous issue of anal/rectal abscess here with pain around that surgical scar and concern for recurrent abscess.  No significant signs or roundness of cutaneous abscess on my examination.  CT scan to better define her  anatomy and this showed no deep space abscess.  There is likely a fistula tract present will cover with antibiotics but given his CT scan no incision and drainage at this time.  She has colorectal follow-up already arranged and will return with new or worsening symptoms.    Diagnosis:    ICD-10-CM    1. Perianal pain K62.89    2. 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       Jonn Erwin MD  11/08/18 0043

## 2018-11-08 NOTE — DISCHARGE INSTRUCTIONS
Please make an appointment to follow up with Colon and Rectal Surgery [phone] as already planned    Return to ER immediately if you develop: worsening pain, swelling, redness, Fever > 101, persistent nausea or vomiting OR you have any other concerns about your health.    Use tylenol and/or ibuprofen for pain or discomfort    Use Oxycodone for severe pain uncontrolled by above medications    Opioid Medication Information  You have been given a prescription for an opioid (narcotic) pain medicine and/or have received a pain medicine while here in the emergency department. These medicines can make you drowsy or impaired. You must not drive, operate dangerous equipment, or engage in any other dangerous activities while taking these medications. If you drive while taking these medications, you could be arrested for DUI, or driving under the influence. Do not drink any alcohol while you are taking these medications.   Opioid pain medications can cause addiction. If you have a history of chemical dependency of any type, you are at a higher risk of becoming addicted to pain medications. Only take these prescribed medications to treat your pain when all other options have been tried. Take it for as short a time and as few doses as possible. Store your pain pills in a secure place, as they are frequently stolen and provide a dangerous opportunity for children or visitors in your house to start abusing these powerful medications. We will not replace any lost or stolen medicine. As soon as your pain is better, you should flush all your remaining medication.   Many prescription pain medications contain Tylenol (acetaminophen), including Vicodin, Tylenol #3, Norco, Lortab, and Percocet. You should not take any extra pills of Tylenol if you are using these prescription medications or you can get very sick. Do not ever take more than 4000 mg of acetaminophen in any 24 hour period.  All opioids tend to cause constipation.  Drink plenty of water and eat foods that have a lot of fiber, such as fruits, vegetables, prune juice, apple juice and high fiber cereal. Take a laxative if you don t move your bowels at least every other day. Miralax, Milk of Magnesia, Colace, or Senna can be used to keep you regular.            Perianal Abscess, Antibiotic Treatment  Glands near the anus can become blocked. This can lead to infection. If the infection can't drain, a collection of pus called an abscess may form. Symptoms of an abscess include anal or rectal pain, itching, swelling, and fever. Frequently the abscess results in a fistula, which is an abnormal connection between the abscess and the skin where pus drains. A fistula may sometimes be seen on exam and may require other testing and treatments.  Your healthcare provider will likely drain the abscess. In some cases, he or she will also prescribe antibiotics. People with artificial valves, diabetes, weak immune systems, and certain other conditions always need antibiotics.  Home care    Abscesses are almost always drained. Follow any instructions from your provider about care of the incision site.      If you are prescribed antibiotics, take them exactly as prescribed. Take all of the antibiotic medicine as prescribed. Continue it even if you start feeling better. Finish all of the medicine unless your healthcare provider tells you to stop.    Try sitz baths. Sit in a tub filled with about 6 inches of hot water for 15 to 30 minutes. Test the water temperature before sitting down to ensure it will not burn you. Repeat this twice a day until pain is relieved.    Unless a pain medicine has been prescribed, you may take an over-the-counter medicine, such as ibuprofen, for pain.     Passing stools may be painful. If so, ask your healthcare provider about using a stool-softener for a short time. Gradually adding fiber to your diet, or taking a fiber supplement, is also helpful.  Follow-up  care  Follow up with your doctor, or as advised.  When to seek medical advice  Call your healthcare provider right away if any of the following occur:    Increasing pain, swelling, or redness    Pus draining from the abscess    Fever of 100.4 F (38 C) or higher that continues for a day after starting antibiotics, or as directed by your healthcare provider    Other symptoms such as rectal bleeding, abdominal pain, or chronic diarrhea. Your provider will evaluate whether the abscess may indicate other medical conditions.  Date Last Reviewed: 3/1/2018    8819-1154 [employer]. [address] [place] [address]. All rights reserved. This information is not intended as a substitute for professional medical care. Always follow your healthcare professional's instructions.

## 2018-11-13 ENCOUNTER — MEDICAL CORRESPONDENCE (OUTPATIENT)
Dept: HEALTH INFORMATION MANAGEMENT | Facility: CLINIC | Age: 28
End: 2018-11-13

## 2018-11-15 ENCOUNTER — OFFICE VISIT (OUTPATIENT)
Dept: PEDIATRICS | Facility: CLINIC | Age: 28
End: 2018-11-15
Payer: COMMERCIAL

## 2018-11-15 VITALS
BODY MASS INDEX: 45.99 KG/M2 | HEART RATE: 85 BPM | DIASTOLIC BLOOD PRESSURE: 78 MMHG | HEIGHT: 67 IN | SYSTOLIC BLOOD PRESSURE: 136 MMHG | WEIGHT: 293 LBS | OXYGEN SATURATION: 97 % | TEMPERATURE: 98 F

## 2018-11-15 DIAGNOSIS — K60.40 PERIRECTAL FISTULA: ICD-10-CM

## 2018-11-15 DIAGNOSIS — Z01.818 PREOP GENERAL PHYSICAL EXAM: Primary | ICD-10-CM

## 2018-11-15 DIAGNOSIS — J45.20 MILD INTERMITTENT ASTHMA WITHOUT COMPLICATION: ICD-10-CM

## 2018-11-15 DIAGNOSIS — E66.01 MORBID OBESITY (H): ICD-10-CM

## 2018-11-15 PROCEDURE — 99214 OFFICE O/P EST MOD 30 MIN: CPT | Mod: GC | Performed by: STUDENT IN AN ORGANIZED HEALTH CARE EDUCATION/TRAINING PROGRAM

## 2018-11-15 RX ORDER — GLUCOSAMINE HCL 500 MG
TABLET ORAL
COMMUNITY
End: 2019-09-10

## 2018-11-15 NOTE — MR AVS SNAPSHOT
After Visit Summary   11/15/2018    Olga Sheehan    MRN: [id number]           Patient Information     Date Of Birth          1990        Visit Information        Provider Department      11/15/2018 8:30 AM John Paul Warren MD [facility]        Today's Diagnoses     Preop general physical exam    -  1    Perirectal fistula        Mild intermittent asthma without complication        Morbid obesity (H)          Care Instructions      Before Your Surgery      Call your surgeon if there is any change in your health. This includes signs of a cold or flu (such as a sore throat, runny nose, cough, rash or fever).    Do not smoke, drink alcohol or take over the counter medicine (unless your surgeon or primary care doctor tells you to) for the 24 hours before and after surgery.    If you take prescribed drugs: Follow your doctor s orders about which medicines to take and which to stop until after surgery.    Eating and drinking prior to surgery: follow the instructions from your surgeon    Take a shower or bath the night before surgery. Use the soap your surgeon gave you to gently clean your skin. If you do not have soap from your surgeon, use your regular soap. Do not shave or scrub the surgery site.  Wear clean pajamas and have clean sheets on your bed.           Follow-ups after your visit        Follow-up notes from your care team     Return in about 1 month (around 12/15/2018) for Physical Exam.      Your next 10 appointments already scheduled     Nov 21, 2018   Procedure with Chanel Parker MD   [facility] (--)    [address]  [facility] [id number] [phone]              Who to contact     If you have questions or need follow up information about today's clinic visit or your schedule please contact [facility] directly at [phone].  Normal or non-critical lab and imaging results will be communicated to you by [facility]  "letter or phone within 4 business days after the clinic has received the results. If you do not hear from us within 7 days, please contact the clinic through [employer] or phone. If you have a critical or abnormal lab result, we will notify you by phone as soon as possible.  Submit refill requests through [employer] or call your pharmacy and they will forward the refill request to us. Please allow 3 business days for your refill to be completed.          Additional Information About Your Visit        [employer] Information     [employer] lets you send messages to your doctor, view your test results, renew your prescriptions, schedule appointments and more. To sign up, go to [email or web address] . Click on \"Log in\" on the left side of the screen, which will take you to the Welcome page. Then click on \"Sign up Now\" on the right side of the page.     You will be asked to enter the access code listed below, as well as some personal information. Please follow the directions to create your username and password.     Your access code is: [email or web address]  Expires: 2019 11:33 PM     Your access code will  in 90 days. If you need help or a new code, please call your [place] clinic or [phone].        Care EveryWhere ID     This is your Care EveryWhere ID. This could be used by other organizations to access your [place] medical records  [id number]        Your Vitals Were     Pulse Temperature Height Last Period Pulse Oximetry BMI (Body Mass Index)    85 98  F (36.7  C) (Tympanic) 5' 7.25\" (1.708 m) 2018 (Approximate) 97% 58.61 kg/m2       Blood Pressure from Last 3 Encounters:   11/15/18 136/78   18 117/74   10/22/18 115/53    Weight from Last 3 Encounters:   11/15/18 (!) 377 lb (171 kg)   10/22/18 324 lb 11.8 oz (147.3 kg)   18 (!) 360 lb (163.3 kg)              Today, you had the following     No orders found for display         Today's Medication Changes          These changes are accurate as of " 11/15/18  8:56 AM.  If you have any questions, ask your nurse or doctor.               Stop taking these medicines if you haven't already. Please contact your care team if you have questions.     ondansetron 4 MG tablet   Commonly known as:  ZOFRAN   Stopped by:  John Paul Warren MD                    Primary Care Provider Fax #    Physician No Ref-Primary [phone]       No address on file        Equal Access to Services     [facility]: Hadii aad ku hadasho Soomaali, waaxda luqadaha, qaybta kaalmada adeegyada, waxay idiin hayjudyn adebianca khlakesh lajaredn . So [facility] [phone].    ATENCIÓN: Si dm henley, tiene a tolbert disposición servicios gratuitos de asistencia lingüística. Llame al [phone].    We comply with applicable federal civil rights laws and [place] laws. We do not discriminate on the basis of race, color, national origin, age, disability, sex, sexual orientation, or gender identity.            Thank you!     Thank you for choosing [facility]  for your care. Our goal is always to provide you with excellent care. Hearing back from our patients is one way we can continue to improve our services. Please take a few minutes to complete the written survey that you may receive in the mail after your visit with us. Thank you!             Your Updated Medication List - Protect others around you: Learn how to safely use, store and throw away your medicines at www.disposemymeds.org.          This list is accurate as of 11/15/18  8:56 AM.  Always use your most recent med list.                   Brand Name Dispense Instructions for use Diagnosis    ALBUTEROL INHALER 17GM      None Entered        ciprofloxacin 500 MG tablet    CIPRO    14 tablet    Take 1 tablet (500 mg) by mouth 2 times daily for 7 days        ibuprofen 600 MG tablet    ADVIL/MOTRIN    30 tablet    Take 1 tablet (600 mg) by mouth every 8 hours as needed for moderate pain        LATUDA PO           lidocaine 2 % topical gel     XYLOCAINE    30 mL    Apply topically as needed for moderate pain Apply to anus qid as needed for pain    Perianal abscess       MELATONIN PO           metroNIDAZOLE 500 MG tablet    FLAGYL    21 tablet    Take 1 tablet (500 mg) by mouth 3 times daily for 7 days        MULTIVITAMIN PO           ondansetron 4 MG ODT tab    ZOFRAN ODT    10 tablet    Take 1 tablet by mouth every 6 hours as needed for nausea.        oxyCODONE IR 5 MG tablet    ROXICODONE    12 tablet    Take 1-2 tablets (5-10 mg) by mouth every 6 hours as needed for pain        polyethylene glycol powder    MIRALAX    510 g    Take 17 g (1 capful) by mouth daily Mix with 8 ounces of water or other liquid        VITAMIN C PO           Vitamin D3 3000 units Tabs

## 2018-11-15 NOTE — PROGRESS NOTES
[facility]  [address]  [address]  [place] [address]  [phone]  Dept: [phone]    PRE-OP EVALUATION:  Today's date: 11/15/2018    Olga Sheehan (: 1990) presents for pre-operative evaluation assessment as requested by Dr. Chanel Parker.  She requires evaluation and anesthesia risk assessment prior to undergoing surgery/procedure for treatment of EXAM UNDER ANESTHESIA, POSSIBLE FISTULOTOMY RECTUM .    Fax number for surgical facility: available electronically - [facility]  Primary Physician: No Ref-Primary, Physician  Type of Anesthesia Anticipated: General    Patient has a Health Care Directive or Living Will:  NO    Preop Questions 11/15/2018   Who is doing your surgery? colon and rectal avi   What are you having done? fistula repair   Date of Surgery/Procedure: 18   Facility or Hospital where procedure/surgery will be performed: [facility]   1.  Do you have a history of Heart attack, stroke, stent, coronary bypass surgery, or other heart surgery? No   2.  Do you ever have any pain or discomfort in your chest? No   3.  Do you have a history of  Heart Failure? No   4.   Are you troubled by shortness of breath when:  walking on a level surface, or up a slight hill, or at night? No   5.  Do you currently have a cold, bronchitis or other respiratory infection? No   6.  Do you have a cough, shortness of breath, or wheezing? No   7.  Do you sometimes get pains in the calves of your legs when you walk? No   8. Do you or anyone in your family have previous history of blood clots? No   9.  Do you or does anyone in your family have a serious bleeding problem such as prolonged bleeding following surgeries or cuts? No   10. Have you ever had problems with anemia or been told to take iron pills? No   11. Have you had any abnormal blood loss such as black, tarry or bloody stools, or abnormal vaginal bleeding? No   12. Have you ever had a blood transfusion?  No   13. Have you or any of your relatives ever had problems with anesthesia? No   14. Do you have sleep apnea, excessive snoring or daytime drowsiness? YES - Uses CPAP and compliant and working well   15. Do you have any prosthetic heart valves? No   16. Do you have prosthetic joints? No   17. Is there any chance that you may be pregnant? No         HPI:     HPI related to upcoming procedure: Has had abscess in the perirectal area in the past and now developed a fistula. Finished ciprofloxacin and metronidazole for 2 weeks last night. No fever. Still having pain, warmth, and erythema around the abscess/fistula. Taking oxycodone as needed for breakthrough pain.     MALINA - well controlled with CPAP now. Compliant with CPAP    ASTHMA - well controlled and only taking albuterol inhaler as needed for exacerbations; has not needed recently. Not received steroid for exacerbation recently    ACT Total Scores 11/15/2018   ACT TOTAL SCORE (Goal Greater than or Equal to 20) 25   In the past 12 months, how many times did you visit the emergency room for your asthma without being admitted to the hospital? 0   In the past 12 months, how many times were you hospitalized overnight because of your asthma? 0       MEDICAL HISTORY:     Patient Active Problem List    Diagnosis Date Noted     Intermittent asthma 03/01/2018     Priority: Medium     Overview:   Triggered by exercise, extreme heat/cold, illness       Obesity 03/01/2018     Priority: Medium     Fatty liver 05/21/2015     Priority: Medium     Moderate depressed bipolar I disorder (H) 07/02/2014     Priority: Medium     Post traumatic stress disorder (PTSD) 07/02/2014     Priority: Medium      Past Medical History:   Diagnosis Date     Asthma      Bipolar affective disorder (H)      Obese      Sleep apnea      Past Surgical History:   Procedure Laterality Date     ENT SURGERY       INCISION AND DRAINAGE RECTUM, COMBINED N/A 6/28/2018    Procedure: COMBINED INCISION AND  "DRAINAGE RECTUM;  Incision and drainage of left ischiorectal fossa abscess;  Surgeon: Dave Davis MD;  Location: [facility] OR     Current Outpatient Prescriptions   Medication Sig Dispense Refill     Ascorbic Acid (VITAMIN C PO)        Cholecalciferol (VITAMIN D3) 3000 units TABS        Lurasidone HCl (LATUDA PO)        MELATONIN PO        Multiple Vitamins-Minerals (MULTIVITAMIN PO)        oxyCODONE IR (ROXICODONE) 5 MG tablet Take 1-2 tablets (5-10 mg) by mouth every 6 hours as needed for pain 12 tablet 0     polyethylene glycol (MIRALAX) powder Take 17 g (1 capful) by mouth daily Mix with 8 ounces of water or other liquid 510 g 1     ALBUTEROL INHALER 17GM None Entered       ibuprofen (ADVIL/MOTRIN) 600 MG tablet Take 1 tablet (600 mg) by mouth every 8 hours as needed for moderate pain (Patient not taking: Reported on 11/15/2018) 30 tablet 0     lidocaine (XYLOCAINE) 2 % topical gel Apply topically as needed for moderate pain Apply to anus qid as needed for pain (Patient not taking: Reported on 11/15/2018) 30 mL 3     ondansetron (ZOFRAN ODT) 4 MG disintegrating tablet Take 1 tablet by mouth every 6 hours as needed for nausea. (Patient not taking: Reported on 11/15/2018) 10 tablet 0     OTC products: no recent use of OTC ASA, NSAIDS or Steroids and herbals and vitamins - Vitamin C, D, and melatonin    Allergies   Allergen Reactions     Ceclor [Cefaclor]      Lamictal [Lamotrigine]      Penicillins       Latex Allergy: NO    Social History   Substance Use Topics     Smoking status: Current Some Day Smoker     Packs/day: 1.00     Smokeless tobacco: Never Used     Alcohol use Yes      Comment: occasionally     History   Drug Use No       REVIEW OF SYSTEMS:   Constitutional, HEENT, cardiovascular, pulmonary, gi and gu systems are negative, except as otherwise noted.    EXAM:   /78 (BP Location: Other (Comment), Cuff Size: Adult Regular)  Pulse 85  Temp 98  F (36.7  C) (Tympanic)  Ht 5' 7.25\" (1.708 m)  Wt (!) " 377 lb (171 kg)  LMP 11/08/2018 (Approximate)  SpO2 97%  BMI 58.61 kg/m2       GENERAL APPEARANCE: healthy, obese female, alert and no distress     EYES: Normal conjunctivae, EOMI, PERRL     HENT: ear canals normal and nose and mouth without ulcers or lesions, MMM, non-erythematous oropharynx, no oral lesions     NECK: no adenopathy, no asymmetry, masses, or scars     RESP: lungs clear to auscultation - no rales, rhonchi or wheezes     CV: Heart sounds difficult to discern due to obesity but regular rates and rhythm, normal S1 S2, no S3 or S4 and no murmur, click or rub     ABDOMEN:  soft, nontender, no HSM or masses and bowel sounds normal, tenderness to palpation at the left side of rectum where fistula is located     SKIN: No rash noted     NEURO: Normal strength and tone, sensory exam grossly normal, mentation intact and speech normal     PSYCH: mentation appears normal. and affect normal/bright     LYMPHATICS: No cervical adenopathy    DIAGNOSTICS:   No labs or EKG required for low risk surgery (cataract, skin procedure, breast biopsy, etc)    Recent Labs   Lab Test  11/07/18   2301  10/22/18   2348   02/21/18   2125   HGB  13.1  12.8   < >  13.9   PLT  342  306   < >  308   INR   --    --    --   1.00   NA  139  140   < >  139   POTASSIUM  3.7  3.9   < >  3.9   CR  0.81  0.83   < >  0.80    < > = values in this interval not displayed.        IMPRESSION:   Reason for surgery/procedure: Perirectal fistula  Diagnosis/reason for consult: Pre-op for fistulotomy    The proposed surgical procedure is considered LOW risk.    REVISED CARDIAC RISK INDEX  The patient has the following serious cardiovascular risks for perioperative complications such as (MI, PE, VFib and 3  AV Block):  No serious cardiac risks  INTERPRETATION: 0 risks: Class I (very low risk - 0.4% complication rate)    The patient has the following additional risks for perioperative complications:  Morbid obesity      ICD-10-CM    1. Preop general  physical exam Z01.818    2. Perirectal fistula K60.4    3. Mild intermittent asthma without complication J45.20 Adequate control.  Continue current regimen without changes.    4. Morbid obesity (H) E66.01    5.   MALINA    RECOMMENDATIONS:   Obstructive Sleep Apnea (or suspected sleep apnea)  Patient is to bring their home CPAP with them on the day of surgery  Patient is clearly advised to use their home CPAP when released from surgery      --Patient is to take all scheduled medications on the day of surgery EXCEPT for modifications listed below.    APPROVAL GIVEN to proceed with proposed procedure, without further diagnostic evaluation       Signed Electronically by: John Paul Warren MD    ===========  STAFF NOTE:  Patient seen with resident physician today.  I was physically present during key portions of the visit and participated in the evaluation and management of the patient today.     Osman Jett MD      Copy of this evaluation report is provided to requesting physician.    [facility] Preop Guidelines    Revised Cardiac Risk Index

## 2018-11-16 ASSESSMENT — ASTHMA QUESTIONNAIRES: ACT_TOTALSCORE: 25

## 2018-11-19 RX ORDER — HYDROCODONE BITARTRATE AND ACETAMINOPHEN 5; 325 MG/1; MG/1
1 TABLET ORAL EVERY 6 HOURS PRN
COMMUNITY
End: 2019-03-19

## 2018-11-19 NOTE — H&P (VIEW-ONLY)
[facility]  [address]  [address]  [place] [address]  [phone]  Dept: [phone]    PRE-OP EVALUATION:  Today's date: 11/15/2018    Olga Sheehan (: 1990) presents for pre-operative evaluation assessment as requested by Dr. Chanel Parker.  She requires evaluation and anesthesia risk assessment prior to undergoing surgery/procedure for treatment of EXAM UNDER ANESTHESIA, POSSIBLE FISTULOTOMY RECTUM .    Fax number for surgical facility: available electronically - [facility]  Primary Physician: No Ref-Primary, Physician  Type of Anesthesia Anticipated: General    Patient has a Health Care Directive or Living Will:  NO    Preop Questions 11/15/2018   Who is doing your surgery? colon and rectal avi   What are you having done? fistula repair   Date of Surgery/Procedure: 18   Facility or Hospital where procedure/surgery will be performed: [facility]   1.  Do you have a history of Heart attack, stroke, stent, coronary bypass surgery, or other heart surgery? No   2.  Do you ever have any pain or discomfort in your chest? No   3.  Do you have a history of  Heart Failure? No   4.   Are you troubled by shortness of breath when:  walking on a level surface, or up a slight hill, or at night? No   5.  Do you currently have a cold, bronchitis or other respiratory infection? No   6.  Do you have a cough, shortness of breath, or wheezing? No   7.  Do you sometimes get pains in the calves of your legs when you walk? No   8. Do you or anyone in your family have previous history of blood clots? No   9.  Do you or does anyone in your family have a serious bleeding problem such as prolonged bleeding following surgeries or cuts? No   10. Have you ever had problems with anemia or been told to take iron pills? No   11. Have you had any abnormal blood loss such as black, tarry or bloody stools, or abnormal vaginal bleeding? No   12. Have you ever had a blood transfusion?  No   13. Have you or any of your relatives ever had problems with anesthesia? No   14. Do you have sleep apnea, excessive snoring or daytime drowsiness? YES - Uses CPAP and compliant and working well   15. Do you have any prosthetic heart valves? No   16. Do you have prosthetic joints? No   17. Is there any chance that you may be pregnant? No         HPI:     HPI related to upcoming procedure: Has had abscess in the perirectal area in the past and now developed a fistula. Finished ciprofloxacin and metronidazole for 2 weeks last night. No fever. Still having pain, warmth, and erythema around the abscess/fistula. Taking oxycodone as needed for breakthrough pain.     MALINA - well controlled with CPAP now. Compliant with CPAP    ASTHMA - well controlled and only taking albuterol inhaler as needed for exacerbations; has not needed recently. Not received steroid for exacerbation recently    ACT Total Scores 11/15/2018   ACT TOTAL SCORE (Goal Greater than or Equal to 20) 25   In the past 12 months, how many times did you visit the emergency room for your asthma without being admitted to the hospital? 0   In the past 12 months, how many times were you hospitalized overnight because of your asthma? 0       MEDICAL HISTORY:     Patient Active Problem List    Diagnosis Date Noted     Intermittent asthma 03/01/2018     Priority: Medium     Overview:   Triggered by exercise, extreme heat/cold, illness       Obesity 03/01/2018     Priority: Medium     Fatty liver 05/21/2015     Priority: Medium     Moderate depressed bipolar I disorder (H) 07/02/2014     Priority: Medium     Post traumatic stress disorder (PTSD) 07/02/2014     Priority: Medium      Past Medical History:   Diagnosis Date     Asthma      Bipolar affective disorder (H)      Obese      Sleep apnea      Past Surgical History:   Procedure Laterality Date     ENT SURGERY       INCISION AND DRAINAGE RECTUM, COMBINED N/A 6/28/2018    Procedure: COMBINED INCISION AND  "DRAINAGE RECTUM;  Incision and drainage of left ischiorectal fossa abscess;  Surgeon: Dave Davis MD;  Location: [facility] OR     Current Outpatient Prescriptions   Medication Sig Dispense Refill     Ascorbic Acid (VITAMIN C PO)        Cholecalciferol (VITAMIN D3) 3000 units TABS        Lurasidone HCl (LATUDA PO)        MELATONIN PO        Multiple Vitamins-Minerals (MULTIVITAMIN PO)        oxyCODONE IR (ROXICODONE) 5 MG tablet Take 1-2 tablets (5-10 mg) by mouth every 6 hours as needed for pain 12 tablet 0     polyethylene glycol (MIRALAX) powder Take 17 g (1 capful) by mouth daily Mix with 8 ounces of water or other liquid 510 g 1     ALBUTEROL INHALER 17GM None Entered       ibuprofen (ADVIL/MOTRIN) 600 MG tablet Take 1 tablet (600 mg) by mouth every 8 hours as needed for moderate pain (Patient not taking: Reported on 11/15/2018) 30 tablet 0     lidocaine (XYLOCAINE) 2 % topical gel Apply topically as needed for moderate pain Apply to anus qid as needed for pain (Patient not taking: Reported on 11/15/2018) 30 mL 3     ondansetron (ZOFRAN ODT) 4 MG disintegrating tablet Take 1 tablet by mouth every 6 hours as needed for nausea. (Patient not taking: Reported on 11/15/2018) 10 tablet 0     OTC products: no recent use of OTC ASA, NSAIDS or Steroids and herbals and vitamins - Vitamin C, D, and melatonin    Allergies   Allergen Reactions     Ceclor [Cefaclor]      Lamictal [Lamotrigine]      Penicillins       Latex Allergy: NO    Social History   Substance Use Topics     Smoking status: Current Some Day Smoker     Packs/day: 1.00     Smokeless tobacco: Never Used     Alcohol use Yes      Comment: occasionally     History   Drug Use No       REVIEW OF SYSTEMS:   Constitutional, HEENT, cardiovascular, pulmonary, gi and gu systems are negative, except as otherwise noted.    EXAM:   /78 (BP Location: Other (Comment), Cuff Size: Adult Regular)  Pulse 85  Temp 98  F (36.7  C) (Tympanic)  Ht 5' 7.25\" (1.708 m)  Wt (!) " 377 lb (171 kg)  LMP 11/08/2018 (Approximate)  SpO2 97%  BMI 58.61 kg/m2       GENERAL APPEARANCE: healthy, obese female, alert and no distress     EYES: Normal conjunctivae, EOMI, PERRL     HENT: ear canals normal and nose and mouth without ulcers or lesions, MMM, non-erythematous oropharynx, no oral lesions     NECK: no adenopathy, no asymmetry, masses, or scars     RESP: lungs clear to auscultation - no rales, rhonchi or wheezes     CV: Heart sounds difficult to discern due to obesity but regular rates and rhythm, normal S1 S2, no S3 or S4 and no murmur, click or rub     ABDOMEN:  soft, nontender, no HSM or masses and bowel sounds normal, tenderness to palpation at the left side of rectum where fistula is located     SKIN: No rash noted     NEURO: Normal strength and tone, sensory exam grossly normal, mentation intact and speech normal     PSYCH: mentation appears normal. and affect normal/bright     LYMPHATICS: No cervical adenopathy    DIAGNOSTICS:   No labs or EKG required for low risk surgery (cataract, skin procedure, breast biopsy, etc)    Recent Labs   Lab Test  11/07/18   2301  10/22/18   2348   02/21/18   2125   HGB  13.1  12.8   < >  13.9   PLT  342  306   < >  308   INR   --    --    --   1.00   NA  139  140   < >  139   POTASSIUM  3.7  3.9   < >  3.9   CR  0.81  0.83   < >  0.80    < > = values in this interval not displayed.        IMPRESSION:   Reason for surgery/procedure: Perirectal fistula  Diagnosis/reason for consult: Pre-op for fistulotomy    The proposed surgical procedure is considered LOW risk.    REVISED CARDIAC RISK INDEX  The patient has the following serious cardiovascular risks for perioperative complications such as (MI, PE, VFib and 3  AV Block):  No serious cardiac risks  INTERPRETATION: 0 risks: Class I (very low risk - 0.4% complication rate)    The patient has the following additional risks for perioperative complications:  Morbid obesity      ICD-10-CM    1. Preop general  physical exam Z01.818    2. Perirectal fistula K60.4    3. Mild intermittent asthma without complication J45.20 Adequate control.  Continue current regimen without changes.    4. Morbid obesity (H) E66.01    5.   MALINA    RECOMMENDATIONS:   Obstructive Sleep Apnea (or suspected sleep apnea)  Patient is to bring their home CPAP with them on the day of surgery  Patient is clearly advised to use their home CPAP when released from surgery      --Patient is to take all scheduled medications on the day of surgery EXCEPT for modifications listed below.    APPROVAL GIVEN to proceed with proposed procedure, without further diagnostic evaluation       Signed Electronically by: John Paul Warren MD    ===========  STAFF NOTE:  Patient seen with resident physician today.  I was physically present during key portions of the visit and participated in the evaluation and management of the patient today.     Osman Jett MD      Copy of this evaluation report is provided to requesting physician.    [facility] Preop Guidelines    Revised Cardiac Risk Index

## 2018-11-21 ENCOUNTER — SURGERY (OUTPATIENT)
Age: 28
End: 2018-11-21

## 2018-11-21 ENCOUNTER — ANESTHESIA (OUTPATIENT)
Dept: SURGERY | Facility: CLINIC | Age: 28
End: 2018-11-21
Payer: COMMERCIAL

## 2018-11-21 ENCOUNTER — ANESTHESIA EVENT (OUTPATIENT)
Dept: SURGERY | Facility: CLINIC | Age: 28
End: 2018-11-21
Payer: COMMERCIAL

## 2018-11-21 ENCOUNTER — HOSPITAL ENCOUNTER (OUTPATIENT)
Facility: CLINIC | Age: 28
Discharge: HOME | End: 2018-11-21
Attending: COLON & RECTAL SURGERY | Admitting: COLON & RECTAL SURGERY
Payer: COMMERCIAL

## 2018-11-21 VITALS
SYSTOLIC BLOOD PRESSURE: 121 MMHG | BODY MASS INDEX: 45.99 KG/M2 | HEART RATE: 78 BPM | WEIGHT: 293 LBS | TEMPERATURE: 97.3 F | HEIGHT: 67 IN | DIASTOLIC BLOOD PRESSURE: 94 MMHG | OXYGEN SATURATION: 97 % | RESPIRATION RATE: 16 BRPM

## 2018-11-21 DIAGNOSIS — K60.30 ANAL FISTULA: Primary | ICD-10-CM

## 2018-11-21 LAB — HCG UR QL: NEGATIVE

## 2018-11-21 PROCEDURE — 36000052 ZZH SURGERY LEVEL 2 EA 15 ADDTL MIN: Performed by: COLON & RECTAL SURGERY

## 2018-11-21 PROCEDURE — 81025 URINE PREGNANCY TEST: CPT | Performed by: ANESTHESIOLOGY

## 2018-11-21 PROCEDURE — 25000566 ZZH SEVOFLURANE, EA 15 MIN: Performed by: COLON & RECTAL SURGERY

## 2018-11-21 PROCEDURE — 71000014 ZZH RECOVERY PHASE 1 LEVEL 2 FIRST HR: Performed by: COLON & RECTAL SURGERY

## 2018-11-21 PROCEDURE — 25000132 ZZH RX MED GY IP 250 OP 250 PS 637: Performed by: COLON & RECTAL SURGERY

## 2018-11-21 PROCEDURE — 25000125 ZZHC RX 250: Performed by: NURSE ANESTHETIST, CERTIFIED REGISTERED

## 2018-11-21 PROCEDURE — 25000125 ZZHC RX 250: Performed by: COLON & RECTAL SURGERY

## 2018-11-21 PROCEDURE — 25000132 ZZH RX MED GY IP 250 OP 250 PS 637: Performed by: ANESTHESIOLOGY

## 2018-11-21 PROCEDURE — 37000009 ZZH ANESTHESIA TECHNICAL FEE, EACH ADDTL 15 MIN: Performed by: COLON & RECTAL SURGERY

## 2018-11-21 PROCEDURE — 25000128 H RX IP 250 OP 636: Performed by: NURSE ANESTHETIST, CERTIFIED REGISTERED

## 2018-11-21 PROCEDURE — 25000128 H RX IP 250 OP 636: Performed by: COLON & RECTAL SURGERY

## 2018-11-21 PROCEDURE — 71000027 ZZH RECOVERY PHASE 2 EACH 15 MINS: Performed by: COLON & RECTAL SURGERY

## 2018-11-21 PROCEDURE — 40000170 ZZH STATISTIC PRE-PROCEDURE ASSESSMENT II: Performed by: COLON & RECTAL SURGERY

## 2018-11-21 PROCEDURE — 27210794 ZZH OR GENERAL SUPPLY STERILE: Performed by: COLON & RECTAL SURGERY

## 2018-11-21 PROCEDURE — 36000050 ZZH SURGERY LEVEL 2 1ST 30 MIN: Performed by: COLON & RECTAL SURGERY

## 2018-11-21 PROCEDURE — 37000008 ZZH ANESTHESIA TECHNICAL FEE, 1ST 30 MIN: Performed by: COLON & RECTAL SURGERY

## 2018-11-21 RX ORDER — HYDROMORPHONE HYDROCHLORIDE 1 MG/ML
.3-.5 INJECTION, SOLUTION INTRAMUSCULAR; INTRAVENOUS; SUBCUTANEOUS EVERY 10 MIN PRN
Status: DISCONTINUED | OUTPATIENT
Start: 2018-11-21 | End: 2018-11-21 | Stop reason: HOSPADM

## 2018-11-21 RX ORDER — ACETAMINOPHEN 325 MG/1
975 TABLET ORAL ONCE
Status: COMPLETED | OUTPATIENT
Start: 2018-11-21 | End: 2018-11-21

## 2018-11-21 RX ORDER — OXYCODONE AND ACETAMINOPHEN 5; 325 MG/1; MG/1
1-2 TABLET ORAL EVERY 4 HOURS PRN
Qty: 12 TABLET | Refills: 0 | Status: SHIPPED | OUTPATIENT
Start: 2018-11-21 | End: 2019-03-19

## 2018-11-21 RX ORDER — SODIUM CHLORIDE, SODIUM LACTATE, POTASSIUM CHLORIDE, CALCIUM CHLORIDE 600; 310; 30; 20 MG/100ML; MG/100ML; MG/100ML; MG/100ML
INJECTION, SOLUTION INTRAVENOUS CONTINUOUS PRN
Status: DISCONTINUED | OUTPATIENT
Start: 2018-11-21 | End: 2018-11-21

## 2018-11-21 RX ORDER — ONDANSETRON 2 MG/ML
INJECTION INTRAMUSCULAR; INTRAVENOUS PRN
Status: DISCONTINUED | OUTPATIENT
Start: 2018-11-21 | End: 2018-11-21

## 2018-11-21 RX ORDER — MAGNESIUM HYDROXIDE 1200 MG/15ML
LIQUID ORAL PRN
Status: DISCONTINUED | OUTPATIENT
Start: 2018-11-21 | End: 2018-11-21 | Stop reason: HOSPADM

## 2018-11-21 RX ORDER — BUPIVACAINE HYDROCHLORIDE 5 MG/ML
INJECTION, SOLUTION EPIDURAL; INTRACAUDAL
Status: DISCONTINUED
Start: 2018-11-21 | End: 2018-11-21 | Stop reason: HOSPADM

## 2018-11-21 RX ORDER — FENTANYL CITRATE 50 UG/ML
25-50 INJECTION, SOLUTION INTRAMUSCULAR; INTRAVENOUS
Status: DISCONTINUED | OUTPATIENT
Start: 2018-11-21 | End: 2018-11-21 | Stop reason: HOSPADM

## 2018-11-21 RX ORDER — ACETAMINOPHEN 325 MG/1
650 TABLET ORAL
Status: DISCONTINUED | OUTPATIENT
Start: 2018-11-21 | End: 2018-11-21 | Stop reason: HOSPADM

## 2018-11-21 RX ORDER — MEPERIDINE HYDROCHLORIDE 25 MG/ML
12.5 INJECTION INTRAMUSCULAR; INTRAVENOUS; SUBCUTANEOUS
Status: DISCONTINUED | OUTPATIENT
Start: 2018-11-21 | End: 2018-11-21 | Stop reason: HOSPADM

## 2018-11-21 RX ORDER — CELECOXIB 200 MG/1
200 CAPSULE ORAL ONCE
Status: COMPLETED | OUTPATIENT
Start: 2018-11-21 | End: 2018-11-21

## 2018-11-21 RX ORDER — DEXAMETHASONE SODIUM PHOSPHATE 4 MG/ML
INJECTION, SOLUTION INTRA-ARTICULAR; INTRALESIONAL; INTRAMUSCULAR; INTRAVENOUS; SOFT TISSUE PRN
Status: DISCONTINUED | OUTPATIENT
Start: 2018-11-21 | End: 2018-11-21

## 2018-11-21 RX ORDER — HYDRALAZINE HYDROCHLORIDE 20 MG/ML
2.5-5 INJECTION INTRAMUSCULAR; INTRAVENOUS EVERY 10 MIN PRN
Status: DISCONTINUED | OUTPATIENT
Start: 2018-11-21 | End: 2018-11-21 | Stop reason: HOSPADM

## 2018-11-21 RX ORDER — OXYCODONE AND ACETAMINOPHEN 5; 325 MG/1; MG/1
2 TABLET ORAL
Status: DISCONTINUED | OUTPATIENT
Start: 2018-11-21 | End: 2018-11-21 | Stop reason: HOSPADM

## 2018-11-21 RX ORDER — PROPOFOL 10 MG/ML
INJECTION, EMULSION INTRAVENOUS CONTINUOUS PRN
Status: DISCONTINUED | OUTPATIENT
Start: 2018-11-21 | End: 2018-11-21

## 2018-11-21 RX ORDER — ONDANSETRON 2 MG/ML
4 INJECTION INTRAMUSCULAR; INTRAVENOUS EVERY 30 MIN PRN
Status: DISCONTINUED | OUTPATIENT
Start: 2018-11-21 | End: 2018-11-21 | Stop reason: HOSPADM

## 2018-11-21 RX ORDER — PROPOFOL 10 MG/ML
INJECTION, EMULSION INTRAVENOUS PRN
Status: DISCONTINUED | OUTPATIENT
Start: 2018-11-21 | End: 2018-11-21

## 2018-11-21 RX ORDER — FENTANYL CITRATE 50 UG/ML
INJECTION, SOLUTION INTRAMUSCULAR; INTRAVENOUS PRN
Status: DISCONTINUED | OUTPATIENT
Start: 2018-11-21 | End: 2018-11-21

## 2018-11-21 RX ORDER — SODIUM CHLORIDE, SODIUM LACTATE, POTASSIUM CHLORIDE, CALCIUM CHLORIDE 600; 310; 30; 20 MG/100ML; MG/100ML; MG/100ML; MG/100ML
INJECTION, SOLUTION INTRAVENOUS CONTINUOUS
Status: DISCONTINUED | OUTPATIENT
Start: 2018-11-21 | End: 2018-11-21 | Stop reason: HOSPADM

## 2018-11-21 RX ORDER — LIDOCAINE HYDROCHLORIDE 20 MG/ML
INJECTION, SOLUTION INFILTRATION; PERINEURAL PRN
Status: DISCONTINUED | OUTPATIENT
Start: 2018-11-21 | End: 2018-11-21

## 2018-11-21 RX ORDER — LIDOCAINE HYDROCHLORIDE AND EPINEPHRINE BITARTRATE 20; .01 MG/ML; MG/ML
INJECTION, SOLUTION SUBCUTANEOUS
Status: DISCONTINUED
Start: 2018-11-21 | End: 2018-11-21 | Stop reason: WASHOUT

## 2018-11-21 RX ORDER — NALOXONE HYDROCHLORIDE 0.4 MG/ML
.1-.4 INJECTION, SOLUTION INTRAMUSCULAR; INTRAVENOUS; SUBCUTANEOUS
Status: DISCONTINUED | OUTPATIENT
Start: 2018-11-21 | End: 2018-11-21 | Stop reason: HOSPADM

## 2018-11-21 RX ORDER — ALBUTEROL SULFATE 0.83 MG/ML
2.5 SOLUTION RESPIRATORY (INHALATION) EVERY 4 HOURS PRN
Status: DISCONTINUED | OUTPATIENT
Start: 2018-11-21 | End: 2018-11-21 | Stop reason: HOSPADM

## 2018-11-21 RX ORDER — ONDANSETRON 4 MG/1
4 TABLET, ORALLY DISINTEGRATING ORAL EVERY 30 MIN PRN
Status: DISCONTINUED | OUTPATIENT
Start: 2018-11-21 | End: 2018-11-21 | Stop reason: HOSPADM

## 2018-11-21 RX ADMIN — ACETAMINOPHEN 975 MG: 325 TABLET, FILM COATED ORAL at 12:24

## 2018-11-21 RX ADMIN — SODIUM CHLORIDE, POTASSIUM CHLORIDE, SODIUM LACTATE AND CALCIUM CHLORIDE: 600; 310; 30; 20 INJECTION, SOLUTION INTRAVENOUS at 15:28

## 2018-11-21 RX ADMIN — NICOTINE 1 PATCH: 7 PATCH, EXTENDED RELEASE TRANSDERMAL at 14:34

## 2018-11-21 RX ADMIN — SUCCINYLCHOLINE CHLORIDE 170 MG: 20 INJECTION, SOLUTION INTRAMUSCULAR; INTRAVENOUS; PARENTERAL at 15:34

## 2018-11-21 RX ADMIN — MIDAZOLAM 2 MG: 1 INJECTION INTRAMUSCULAR; INTRAVENOUS at 15:28

## 2018-11-21 RX ADMIN — DEXAMETHASONE SODIUM PHOSPHATE 8 MG: 4 INJECTION, SOLUTION INTRA-ARTICULAR; INTRALESIONAL; INTRAMUSCULAR; INTRAVENOUS; SOFT TISSUE at 15:57

## 2018-11-21 RX ADMIN — ONDANSETRON 4 MG: 2 INJECTION INTRAMUSCULAR; INTRAVENOUS at 16:02

## 2018-11-21 RX ADMIN — FENTANYL CITRATE 100 MCG: 50 INJECTION, SOLUTION INTRAMUSCULAR; INTRAVENOUS at 15:34

## 2018-11-21 RX ADMIN — PROPOFOL 50 MCG/KG/MIN: 10 INJECTION, EMULSION INTRAVENOUS at 15:50

## 2018-11-21 RX ADMIN — PROPOFOL 100 MG: 10 INJECTION, EMULSION INTRAVENOUS at 15:50

## 2018-11-21 RX ADMIN — LIDOCAINE HYDROCHLORIDE 100 MG: 20 INJECTION, SOLUTION INFILTRATION; PERINEURAL at 15:34

## 2018-11-21 RX ADMIN — FENTANYL CITRATE 100 MCG: 50 INJECTION, SOLUTION INTRAMUSCULAR; INTRAVENOUS at 15:54

## 2018-11-21 RX ADMIN — CELECOXIB 200 MG: 200 CAPSULE ORAL at 12:24

## 2018-11-21 RX ADMIN — SODIUM CHLORIDE 1000 ML: 900 IRRIGANT IRRIGATION at 15:25

## 2018-11-21 RX ADMIN — PROPOFOL 50 MG: 10 INJECTION, EMULSION INTRAVENOUS at 16:01

## 2018-11-21 RX ADMIN — PROPOFOL 300 MG: 10 INJECTION, EMULSION INTRAVENOUS at 15:34

## 2018-11-21 RX ADMIN — LIDOCAINE HYDROCHLORIDE 25 ML: 10; .005 INJECTION, SOLUTION EPIDURAL; INFILTRATION; INTRACAUDAL; PERINEURAL at 16:06

## 2018-11-21 ASSESSMENT — ENCOUNTER SYMPTOMS
SEIZURES: 0
DYSRHYTHMIAS: 0

## 2018-11-21 ASSESSMENT — COPD QUESTIONNAIRES: COPD: 0

## 2018-11-21 ASSESSMENT — LIFESTYLE VARIABLES: TOBACCO_USE: 1

## 2018-11-21 NOTE — IP AVS SNAPSHOT
MRN:[id number]                      After Visit Summary   11/21/2018    Olga Sheehan    MRN: [id number]           Thank you!     Thank you for choosing [facility] for your care. Our goal is always to provide you with excellent care. Hearing back from our patients is one way we can continue to improve our services. Please take a few minutes to complete the written survey that you may receive in the mail after you visit with us. Thank you!        Patient Information     Date Of Birth          1990        Designated Caregiver       Most Recent Value    Caregiver    Will someone help with your care after discharge? yes    Name of designated caregiver Kiara    Phone number of caregiver [phone]      About your hospital stay     You were admitted on:  November 21, 2018 [facility]    You were discharged on:  November 21, 2018       Who to Call     For medical emergencies, please call 911.  For non-urgent questions about your medical care, please call your primary care provider or clinic, None  For questions related to your surgery, please call your surgery clinic        Attending Provider     Provider Chanel Camarillo MD Surgery       Primary Care Provider Fax #    Physician No Ref-Primary [phone]      After Care Instructions     Diet Instructions       Resume pre-procedure diet            Discharge Instructions       -Make an appointment to follow up with Dr. Parker in 3-4weeks (or as previously scheduled). Call [phone] to schedule this appointment.   -Call the office if you develop pain that is not controlled with pain medication, bleeding that does stop, fever, or constipation.   -Dress the area with dry gauze to prevent spotting on clothes.   -Do warm baths several times daily.   -Take Metamucil 1 tablespoon daily in 8 ounces of fluid or Miralax  if necessary to prevent constipation.   -Resume activities as  tolerated.   -No driving while taking narcotics.  -Tylenol and ibuprofen can be taken for discomfort / pain.            Dressing       Keep dressing clean and dry.  Dressing / incisional care as instructed by RN and or Surgeon            Ice to affected area       Ice to operative site PRN            No Alcohol       For 24 hours post procedure                  Further instructions from your care team       While you were at the hospital today you received Celebrex at 12:25pm, an antiinflammatory medication similar to Ibuprofen.  You should not take other antiinflammatory medication, such as Ibuprofen, Motrin, Advil, Aleve, Naprosyn, etc, for at least 8 hours.       Today you were given 975 mg of Tylenol at 12:25pm. The recommended daily maximum dose is 4000 mg.     **Because you had anesthesia today and your history of sleep apnea, it is extremely important that you use your CPAP machine for the next 24 hours while napping or sleeping.**    Same Day Surgery Discharge Instructions for  Sedation and General Anesthesia       It's not unusual to feel dizzy, light-headed or faint for up to 24 hours after surgery or while taking pain medication.  If you have these symptoms: sit for a few minutes before standing and have someone assist you when you get up to walk or use the bathroom.      You should rest and relax for the next 24 hours. We recommend you make arrangements to have an adult stay with you for at least 24 hours after your discharge.  Avoid hazardous and strenuous activity.      DO NOT DRIVE any vehicle or operate mechanical equipment for 24 hours following the end of your surgery.  Even though you may feel normal, your reactions may be affected by the medication you have received.      Do not drink alcoholic beverages for 24 hours following surgery.       Slowly progress to your regular diet as you feel able. It's not unusual to feel nauseated and/or vomit after receiving anesthesia.  If you develop these  symptoms, drink clear liquids (apple juice, ginger ale, broth, 7-up, etc. ) until you feel better.  If your nausea and vomiting persists for 24 hours, please notify your surgeon.        All narcotic pain medications, along with inactivity and anesthesia, can cause constipation. Drinking plenty of liquids and increasing fiber intake will help.      For any questions of a medical nature, call your surgeon.      Do not make important decisions for 24 hours.      If you had general anesthesia, you may have a sore throat for a couple of days related to the breathing tube used during surgery.  You may use Cepacol lozenges to help with this discomfort.  If it worsens or if you develop a fever, contact your surgeon.       If you feel your pain is not well managed with the pain medications prescribed by your surgeon, please contact your surgeon's office to let them know so they can address your concerns.       Discharge Instructions Following Anorectal Surgery  [facility]  [phone]  Medication:     You may be prescribed a narcotic pain relievers such as: Vicodin, Percocet, and Tylenol # 3.  You should reduce your use of these medications as your pain improves.  You may be prescribed an anal ointment (such as [place], Tronothane, or Xylocaine). Apply the ointment to the anal area with your finger, then cover the area with cotton or gauze.  Stool softeners (Miralax) are to be taken in a glass of water once in the morning with increased water intake throughout the day.   Bowel function:    It is important to have soft bowel movements at least every other day and to keep your stool soft. Constipation and/or diarrhea can make the pain worse and must be avoided.   Constipation:  You should have a bowel movement at least every other day.  If two days pass without one, take one tablespoon of milk of magnesia; if there is no result, repeat this dose in six hours.   Diarrhea:  Diarrhea, usually caused by  the overuse of laxatives, is also a concern. If you have more than three watery bowel movements during a 24 hour period, stop taking milk of magnesia or laxatives. If diarrhea persists, call your doctor.   Bathing:  After bowel movements, use a wet washcloth, toilet paper or cotton to clean yourself.  If possible take a sitz bath or tub bath immediately. Baths should last between 10-15 minutes with the water as warm as you can comfortably tolerate. Try to take at least three sitz baths (or showers with hand-held sprayer) every day.   Discharge/Infection:  Bloody discharge after bowel movements is normal and may last 2 to 4 weeks after your surgery. However, if you have bleeding between bowel movements that doesn't stop, call the office.  Urination:  If you have trouble urinating, do so while sitting in a tub of water, or run the water faucet while sitting on the toilet. If you are unable to urinate 6-8 hours after surgery, call the office.  Diet:  A high fiber diet, including at least four servings of fruits or vegetables daily, will help to keep your bowel movements regular and soft. It is important to drink six to eight glasses of water or juice everyday when using fiber products.   Activity:    Activity as tolerated. After some surgeries, you may be told not to perform any lifting (more than 10 pounds) for several weeks after surgery.    Call the office if you have:  Fever greater than 101   Chills   Foul-smelling drainage   Nausea and vomiting   Diarrhea - greater than 3 water stools in 24 hours   Constipation - no bowel movement for 3 days   Severe bleeding that does not stop soon after a bowel movement   Problems with the incision, including increased pain, swelling, redness, or drainage.  Difficulty urinating            Pending Results     No orders found from 11/19/2018 to 11/22/2018.            Admission Information     Date & Time Provider Department Dept. Phone    11/21/2018 Chanel Parker MD  "[facility] Same Day Surgery [phone]      Your Vitals Were     Blood Pressure Pulse Temperature Respirations Height Weight    107/53 79 97.3  F (36.3  C) 20 1.702 m (5' 7\") 169.7 kg (374 lb 1.6 oz)    Last Period Pulse Oximetry BMI (Body Mass Index)             2018 (Approximate) 99% 58.59 kg/m2         Let's Talk Information     Let's Talk lets you send messages to your doctor, view your test results, renew your prescriptions, schedule appointments and more. To sign up, go to [email or web address] . Click on \"Log in\" on the left side of the screen, which will take you to the Welcome page. Then click on \"Sign up Now\" on the right side of the page.     You will be asked to enter the access code listed below, as well as some personal information. Please follow the directions to create your username and password.     Your access code is: [email or web address]  Expires: 2019 11:33 PM     Your access code will  in 90 days. If you need help or a new code, please call your [facility] clinic or [phone].        Care EveryWhere ID     This is your Care EveryWhere ID. This could be used by other organizations to access your [facility] medical records  [id number]        Equal Access to Services     BYRON MEDRANO AH: Hadalex Ruiz, waaxda luqadaha, qaybta kaalmada cale, zaire driscoll. So [facility] [phone].    ATENCIÓN: Si habla español, tiene a tolebrt disposición servicios gratuitos de asistencia lingüística. Rafael al [phone].    We comply with applicable federal civil rights laws and [place] laws. We do not discriminate on the basis of race, color, national origin, age, disability, sex, sexual orientation, or gender identity.               Review of your medicines      START taking        Dose / Directions    oxyCODONE-acetaminophen 5-325 MG tablet   Commonly known as:  PERCOCET   Used for:  Anal fistula        Dose:  1-2 tablet   Take 1-2 tablets by mouth " every 4 hours as needed for moderate to severe pain   Quantity:  12 tablet   Refills:  0         CONTINUE these medicines which have NOT CHANGED        Dose / Directions    ALBUTEROL INHALER 17GM        None Entered   Refills:  0       HYDROcodone-acetaminophen 5-325 MG tablet   Commonly known as:  NORCO        Dose:  1 tablet   Take 1 tablet by mouth every 6 hours as needed for severe pain   Refills:  0       ibuprofen 600 MG tablet   Commonly known as:  ADVIL/MOTRIN        Dose:  600 mg   Take 1 tablet (600 mg) by mouth every 8 hours as needed for moderate pain   Quantity:  30 tablet   Refills:  0       LATUDA PO        Refills:  0       lidocaine 2 % topical gel   Commonly known as:  XYLOCAINE   Used for:  Perianal abscess        Apply topically as needed for moderate pain Apply to anus qid as needed for pain   Quantity:  30 mL   Refills:  3       MELATONIN PO        Refills:  0       MULTIVITAMIN PO        Refills:  0       ondansetron 4 MG ODT tab   Commonly known as:  ZOFRAN ODT        Dose:  4 mg   Take 1 tablet by mouth every 6 hours as needed for nausea.   Quantity:  10 tablet   Refills:  0       oxyCODONE 5 MG tablet   Commonly known as:  ROXICODONE        Dose:  5-10 mg   Take 1-2 tablets (5-10 mg) by mouth every 6 hours as needed for pain   Quantity:  12 tablet   Refills:  0       polyethylene glycol powder   Commonly known as:  MIRALAX        Dose:  1 capful   Take 17 g (1 capful) by mouth daily Mix with 8 ounces of water or other liquid   Quantity:  510 g   Refills:  1       VITAMIN C PO        Refills:  0       Vitamin D3 3000 units Tabs        Refills:  0            Where to get your medicines      Some of these will need a paper prescription and others can be bought over the counter. Ask your nurse if you have questions.     Bring a paper prescription for each of these medications     oxyCODONE-acetaminophen 5-325 MG tablet                Protect others around you: Learn how to safely use, store and  throw away your medicines at www.disposemymeds.org.        Information about OPIOIDS     PRESCRIPTION OPIOIDS: WHAT YOU NEED TO KNOW   We gave you an opioid (narcotic) pain medicine. It is important to manage your pain, but opioids are not always the best choice. You should first try all the other options your care team gave you. Take this medicine for as short a time (and as few doses) as possible.    Some activities can increase your pain, such as bandage changes or therapy sessions. It may help to take your pain medicine 30 to 60 minutes before these activities. Reduce your stress by getting enough sleep, working on hobbies you enjoy and practicing relaxation or meditation. Talk to your care team about ways to manage your pain beyond prescription opioids.    These medicines have risks:    DO NOT drive when on new or higher doses of pain medicine. These medicines can affect your alertness and reaction times, and you could be arrested for driving under the influence (DUI). If you need to use opioids long-term, talk to your care team about driving.    DO NOT operate heavy machinery    DO NOT do any other dangerous activities while taking these medicines.    DO NOT drink any alcohol while taking these medicines.     If the opioid prescribed includes acetaminophen, DO NOT take with any other medicines that contain acetaminophen. Read all labels carefully. Look for the word  acetaminophen  or  Tylenol.  Ask your pharmacist if you have questions or are unsure.    You can get addicted to pain medicines, especially if you have a history of addiction (chemical, alcohol or substance dependence). Talk to your care team about ways to reduce this risk.    All opioids tend to cause constipation. Drink plenty of water and eat foods that have a lot of fiber, such as fruits, vegetables, prune juice, apple juice and high-fiber cereal. Take a laxative (Miralax, milk of magnesia, Colace, Senna) if you don t move your bowels at least  every other day. Other side effects include upset stomach, sleepiness, dizziness, throwing up, tolerance (needing more of the medicine to have the same effect), physical dependence and slowed breathing.    Store your pills in a secure place, locked if possible. We will not replace any lost or stolen medicine. If you don t finish your medicine, please throw away (dispose) as directed by your pharmacist. The [place] Pollution [employer] has more information about safe disposal: https://www.pca.[facility].mn.us/living-green/managing-unwanted-medications             Medication List: This is a list of all your medications and when to take them. Check marks below indicate your daily home schedule. Keep this list as a reference.      Medications           Morning Afternoon Evening Bedtime As Needed    ALBUTEROL INHALER 17GM   None Entered                                HYDROcodone-acetaminophen 5-325 MG tablet   Commonly known as:  NORCO   Take 1 tablet by mouth every 6 hours as needed for severe pain                                ibuprofen 600 MG tablet   Commonly known as:  ADVIL/MOTRIN   Take 1 tablet (600 mg) by mouth every 8 hours as needed for moderate pain                                LATUDA PO                                lidocaine 2 % topical gel   Commonly known as:  XYLOCAINE   Apply topically as needed for moderate pain Apply to anus qid as needed for pain                                MELATONIN PO                                MULTIVITAMIN PO                                ondansetron 4 MG ODT tab   Commonly known as:  ZOFRAN ODT   Take 1 tablet by mouth every 6 hours as needed for nausea.                                oxyCODONE 5 MG tablet   Commonly known as:  ROXICODONE   Take 1-2 tablets (5-10 mg) by mouth every 6 hours as needed for pain                                oxyCODONE-acetaminophen 5-325 MG tablet   Commonly known as:  PERCOCET   Take 1-2 tablets by mouth every 4 hours as needed for  moderate to severe pain                                polyethylene glycol powder   Commonly known as:  MIRALAX   Take 17 g (1 capful) by mouth daily Mix with 8 ounces of water or other liquid                                VITAMIN C PO                                Vitamin D3 3000 units Tabs

## 2018-11-21 NOTE — ANESTHESIA CARE TRANSFER NOTE
Patient: Olga Sheehan    Procedure(s):  EXAM UNDER ANESTHESIA  PLACEMENT OF SETON RECTUM    Diagnosis: FISTULA   Diagnosis Additional Information: No value filed.    Anesthesia Type:   General, ETT     Note:  Airway :Face Mask  Patient transferred to:PACU  Comments: Vitals stable, exchanging well, answering questions.      Vitals: (Last set prior to Anesthesia Care Transfer)    CRNA VITALS  11/21/2018 1543 - 11/21/2018 1617      11/21/2018             Pulse: 97    SpO2: 94 %    Resp Rate (observed): (!)  3    Resp Rate (set): 10                Electronically Signed By: NIALL Krishna CRNA  November 21, 2018  4:17 PM

## 2018-11-21 NOTE — ANESTHESIA POSTPROCEDURE EVALUATION
Patient: Olga Sheehan    Procedure(s):  EXAM UNDER ANESTHESIA  PLACEMENT OF SETON RECTUM    Diagnosis:FISTULA   Diagnosis Additional Information: No value filed.    Anesthesia Type:  General, ETT    Note:  Anesthesia Post Evaluation    Patient location during evaluation: PACU  Patient participation: Able to fully participate in evaluation  Level of consciousness: awake and alert  Pain management: adequate  Airway patency: patent  Cardiovascular status: acceptable  Respiratory status: acceptable  Hydration status: acceptable  PONV: none     Anesthetic complications: None          Last vitals:  Vitals:    11/21/18 1234 11/21/18 1614 11/21/18 1630   BP: 114/78 109/63 107/53   Pulse: 79     Resp: 16 14 20   Temp: 36.1  C (97  F) 36.3  C (97.3  F)    SpO2:  99% 99%         Electronically Signed By: Jacki Foster MD  November 21, 2018  4:44 PM

## 2018-11-21 NOTE — ANESTHESIA PREPROCEDURE EVALUATION
Procedure: Procedure(s):  EXAM UNDER ANESTHESIA, POSSIBLE FISTULOTOMY RECTUM  POSSIBLE PLACEMENT OF SETON RECTUM  Preop diagnosis: FISTULA     Allergies   Allergen Reactions     Lamictal [Lamotrigine]      Ceclor [Cefaclor] Rash     Penicillins Rash     Past Medical History:   Diagnosis Date     Anxiety and depression      Asthma      Bipolar affective disorder (H)      Obese      Sleep apnea     CPAP     Past Surgical History:   Procedure Laterality Date     ENT SURGERY       INCISION AND DRAINAGE RECTUM, COMBINED N/A 6/28/2018    Procedure: COMBINED INCISION AND DRAINAGE RECTUM;  Incision and drainage of left ischiorectal fossa abscess;  Surgeon: Dave Davis MD;  Location: [facility] OR     Prior to Admission medications    Medication Sig Start Date End Date Taking? Authorizing Provider   HYDROcodone-acetaminophen (NORCO) 5-325 MG per tablet Take 1 tablet by mouth every 6 hours as needed for severe pain   Yes Reported, Patient   ALBUTEROL INHALER 17GM None Entered    Reported, Patient   Ascorbic Acid (VITAMIN C PO)     Reported, Patient   Cholecalciferol (VITAMIN D3) 3000 units TABS     Reported, Patient   ibuprofen (ADVIL/MOTRIN) 600 MG tablet Take 1 tablet (600 mg) by mouth every 8 hours as needed for moderate pain  Patient not taking: Reported on 11/15/2018 2/22/18   Lee Escalera MD   lidocaine (XYLOCAINE) 2 % topical gel Apply topically as needed for moderate pain Apply to anus qid as needed for pain  Patient not taking: Reported on 11/15/2018 6/28/18   Dave Davis MD   Lurasidone HCl (LATUDA PO)     Reported, Patient   MELATONIN PO     Reported, Patient   Multiple Vitamins-Minerals (MULTIVITAMIN PO)     Reported, Patient   ondansetron (ZOFRAN ODT) 4 MG disintegrating tablet Take 1 tablet by mouth every 6 hours as needed for nausea.  Patient not taking: Reported on 11/15/2018 7/2/12   Ingrid Shah MD   oxyCODONE IR (ROXICODONE) 5 MG tablet Take 1-2 tablets (5-10 mg) by mouth every 6 hours as  needed for pain 11/8/18   Jonn Erwin MD   polyethylene glycol (MIRALAX) powder Take 17 g (1 capful) by mouth daily Mix with 8 ounces of water or other liquid 2/21/18   Lee Escalera MD     Current Facility-Administered Medications Ordered in Epic   Medication Dose Route Frequency Last Rate Last Dose     acetaminophen (TYLENOL) tablet 975 mg  975 mg Oral Once         celecoxib (celeBREX) capsule 200 mg  200 mg Oral Once         PRE OP antibiotics NOT needed for this surgical procedure  1 each As instructed Continuous         No current [facility]-ordered outpatient prescriptions on file.     Wt Readings from Last 1 Encounters:   11/15/18 (!) 171 kg (377 lb)     Temp Readings from Last 1 Encounters:   11/15/18 36.7  C (98  F) (Tympanic)     BP Readings from Last 6 Encounters:   11/15/18 136/78   11/07/18 117/74   10/22/18 115/53   06/28/18 (!) 141/92   06/27/18 (!) 160/104   02/22/18 134/82     Pulse Readings from Last 4 Encounters:   11/15/18 85   11/07/18 88   10/22/18 84   06/27/18 119     Resp Readings from Last 1 Encounters:   11/07/18 18     SpO2 Readings from Last 1 Encounters:   11/15/18 97%     Recent Labs   Lab Test  11/07/18   2301  10/22/18   2348   NA  139  140   POTASSIUM  3.7  3.9   CHLORIDE  107  109   CO2  25  26   ANIONGAP  7  5   GLC  97  113*   BUN  11  11   CR  0.81  0.83   JEFRY  8.7  8.8     Recent Labs   Lab Test  11/07/18   2301  10/22/18   2348   WBC  12.5*  9.8   HGB  13.1  12.8   PLT  342  306     Recent Labs   Lab Test  02/21/18   2125   INR  1.00        Anesthesia Evaluation     .             ROS/MED HX    ENT/Pulmonary:     (+)sleep apnea, tobacco use, Current use asthma , . .   (-) COPD   Neurologic:      (-) seizures, CVA and migraines   Cardiovascular:        (-) hypertension, CAD, arrhythmias, valvular problems/murmurs and dyslipidemia   METS/Exercise Tolerance:  >4 METS   Hematologic:        (-) history of blood clots, anemia and other hematologic disorder   Musculoskeletal:         (-) arthritis   GI/Hepatic:     (+) liver disease (fatty liver),      (-) GERD   Renal/Genitourinary:      (-) renal disease and nephrolithiasis   Endo:     (+) Obesity (morbid), .   (-) Type I DM, Type II DM and thyroid disease   Psychiatric:     (+) psychiatric history anxiety and depression (biopolar)      Infectious Disease:        (-) Recent Fever   Malignancy:         Other:                     Physical Exam  Normal systems: cardiovascular, pulmonary and dental    Airway   Mallampati: III  TM distance: >3 FB  Neck ROM: full    Dental     Cardiovascular   Rhythm and rate: regular and normal  (-) no murmur    Pulmonary    breath sounds clear to auscultation                    Anesthesia Plan      History & Physical Review      ASA Status:  3 .    NPO Status:  > 8 hours    Plan for General and ETT with Propofol induction. Maintenance will be Balanced.    PONV prophylaxis:  Ondansetron (or other 5HT-3) and Dexamethasone or Solumedrol  Propofol infusion      Postoperative Care  Postoperative pain management:  Multi-modal analgesia.      Consents  Anesthetic plan, risks, benefits and alternatives discussed with:  Patient..                          .

## 2018-11-21 NOTE — OP NOTE
PREOPERATIVE DIAGNOSIS: Fistula-in-ano.     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     PROCEDURE:   1. Exam under anesthesia.   2. Debridement of external fistula opening.  3. Placement of Seton in Fistula-in-ano     SURGEON: Cinda Parker MD     ANESTHESIA: General.     INDICATION: Olga Sheehan is a 28 year old female with a history of left ischiorectal abscess s/p I&D in June of 2018.  She continued to have purulent drainage from a small external opening near her incision and drainage site.  She will now undergo exam under anesthesia.     OPERATIVE FINDINGS: The patient had an external fistula opening in the left anterior position approximately 20mm from the anal verge. The internal opening was located in the anterior midline. The fistula tract involved skin, subcutaneous tissue and anal sphincter muscle. Approximately 30% of the sphincter complex is involved.  The external opening was debrided to facilitate drainage and a non-cutting seton was placed.  There was no evidence of undrained abscess or perianal sepsis.  Anoscopy revealed normal rectal mucosa.      PROCEDURE IN DETAIL: After informed consent was obtained, the patient was brought to the operating room, where general anesthesia was induced without difficulty. She was flipped into a well-padded prone jackknife position, with the buttocks taped apart. The perianal region was sterilely prepped and draped in the usual fashion. A Herman bivalve retractor was inserted into the anal canal and the operative findings are noted above.   A gently curve fistula probe was inserted into the external fistula opening in the left anterior position and tracked easily into the internal opening in the anterior midline. Palpation of the tissue on top of the probe revealed a fistula tract that involved skin, subcutaneous tissue and sphincter muscle. The external opening of the fistula was made slightly larger using bovie electrocautery and  the tract was curetted out.  A 0-PDS suture was threaded overtop of the gently curved fistula probe through the fistula tract.  This O-PDS suture was then secured to a red vessel loop which was pulled through the tract.  The vessel loop was then secured to itself with 0-silk as a seton.  The seton was loose and easily spun through the tract.    A solution of 0.5% Marcaine and 1% lidocaine with epinephrine totalling 25 mL was instilled at the base of the fistula and perianal region for prolonged postoperative analgesia. Antibiotic ointment and sterile gauze dressing was applied.   The patient tolerated the procedure well, without complications. Estimated blood loss was 1 mL. I was present and scrubbed for the entire duration of the operation. The patient was returned to the supine position, extubated in the Operating Room and brought to the Recovery Room in stable condition.     Cinda Parker MD

## 2018-11-21 NOTE — IP AVS SNAPSHOT
[facility]    [address]    [place] [address]    Phone:  [phone]    Fax:  [phone]                                       After Visit Summary   11/21/2018    Olga Sheehan    MRN: [id number]           After Visit Summary Signature Page     I have received my discharge instructions, and my questions have been answered. I have discussed any challenges I see with this plan with the nurse or doctor.    ..........................................................................................................................................  Patient/Patient Representative Signature      ..........................................................................................................................................  Patient Representative Print Name and Relationship to Patient    ..................................................               ................................................  Date                                   Time    ..........................................................................................................................................  Reviewed by Signature/Title    ...................................................              ..............................................  Date                                               Time          22EPIC Rev 08/18

## 2018-11-21 NOTE — DISCHARGE INSTRUCTIONS
While you were at the hospital today you received Celebrex at 12:25pm, an antiinflammatory medication similar to Ibuprofen.  You should not take other antiinflammatory medication, such as Ibuprofen, Motrin, Advil, Aleve, Naprosyn, etc, for at least 8 hours.       Today you were given 975 mg of Tylenol at 12:25pm. The recommended daily maximum dose is 4000 mg.     **Because you had anesthesia today and your history of sleep apnea, it is extremely important that you use your CPAP machine for the next 24 hours while napping or sleeping.**    Same Day Surgery Discharge Instructions for  Sedation and General Anesthesia       It's not unusual to feel dizzy, light-headed or faint for up to 24 hours after surgery or while taking pain medication.  If you have these symptoms: sit for a few minutes before standing and have someone assist you when you get up to walk or use the bathroom.      You should rest and relax for the next 24 hours. We recommend you make arrangements to have an adult stay with you for at least 24 hours after your discharge.  Avoid hazardous and strenuous activity.      DO NOT DRIVE any vehicle or operate mechanical equipment for 24 hours following the end of your surgery.  Even though you may feel normal, your reactions may be affected by the medication you have received.      Do not drink alcoholic beverages for 24 hours following surgery.       Slowly progress to your regular diet as you feel able. It's not unusual to feel nauseated and/or vomit after receiving anesthesia.  If you develop these symptoms, drink clear liquids (apple juice, ginger ale, broth, 7-up, etc. ) until you feel better.  If your nausea and vomiting persists for 24 hours, please notify your surgeon.        All narcotic pain medications, along with inactivity and anesthesia, can cause constipation. Drinking plenty of liquids and increasing fiber intake will help.      For any questions of a medical nature, call your surgeon.      Do  not make important decisions for 24 hours.      If you had general anesthesia, you may have a sore throat for a couple of days related to the breathing tube used during surgery.  You may use Cepacol lozenges to help with this discomfort.  If it worsens or if you develop a fever, contact your surgeon.       If you feel your pain is not well managed with the pain medications prescribed by your surgeon, please contact your surgeon's office to let them know so they can address your concerns.       Discharge Instructions Following Anorectal Surgery  [facility]  [phone]  Medication:     You may be prescribed a narcotic pain relievers such as: Vicodin, Percocet, and Tylenol # 3.  You should reduce your use of these medications as your pain improves.  You may be prescribed an anal ointment (such as Americain, Tronothane, or Xylocaine). Apply the ointment to the anal area with your finger, then cover the area with cotton or gauze.  Stool softeners (Miralax) are to be taken in a glass of water once in the morning with increased water intake throughout the day.   Bowel function:    It is important to have soft bowel movements at least every other day and to keep your stool soft. Constipation and/or diarrhea can make the pain worse and must be avoided.   Constipation:  You should have a bowel movement at least every other day.  If two days pass without one, take one tablespoon of milk of magnesia; if there is no result, repeat this dose in six hours.   Diarrhea:  Diarrhea, usually caused by the overuse of laxatives, is also a concern. If you have more than three watery bowel movements during a 24 hour period, stop taking milk of magnesia or laxatives. If diarrhea persists, call your doctor.   Bathing:  After bowel movements, use a wet washcloth, toilet paper or cotton to clean yourself.  If possible take a sitz bath or tub bath immediately. Baths should last between 10-15 minutes with the water as  warm as you can comfortably tolerate. Try to take at least three sitz baths (or showers with hand-held sprayer) every day.   Discharge/Infection:  Bloody discharge after bowel movements is normal and may last 2 to 4 weeks after your surgery. However, if you have bleeding between bowel movements that doesn't stop, call the office.  Urination:  If you have trouble urinating, do so while sitting in a tub of water, or run the water faucet while sitting on the toilet. If you are unable to urinate 6-8 hours after surgery, call the office.  Diet:  A high fiber diet, including at least four servings of fruits or vegetables daily, will help to keep your bowel movements regular and soft. It is important to drink six to eight glasses of water or juice everyday when using fiber products.   Activity:    Activity as tolerated. After some surgeries, you may be told not to perform any lifting (more than 10 pounds) for several weeks after surgery.    Call the office if you have:  Fever greater than 101   Chills   Foul-smelling drainage   Nausea and vomiting   Diarrhea - greater than 3 water stools in 24 hours   Constipation - no bowel movement for 3 days   Severe bleeding that does not stop soon after a bowel movement   Problems with the incision, including increased pain, swelling, redness, or drainage.  Difficulty urinating

## 2019-02-04 ENCOUNTER — TRANSFERRED RECORDS (OUTPATIENT)
Dept: HEALTH INFORMATION MANAGEMENT | Facility: CLINIC | Age: 29
End: 2019-02-04

## 2019-03-04 ENCOUNTER — HOSPITAL ENCOUNTER (EMERGENCY)
Facility: CLINIC | Age: 29
Discharge: HOME | End: 2019-03-04
Attending: EMERGENCY MEDICINE | Admitting: EMERGENCY MEDICINE
Payer: COMMERCIAL

## 2019-03-04 VITALS
DIASTOLIC BLOOD PRESSURE: 73 MMHG | WEIGHT: 293 LBS | OXYGEN SATURATION: 98 % | HEART RATE: 64 BPM | BODY MASS INDEX: 45.99 KG/M2 | HEIGHT: 67 IN | RESPIRATION RATE: 18 BRPM | SYSTOLIC BLOOD PRESSURE: 117 MMHG | TEMPERATURE: 98.2 F

## 2019-03-04 DIAGNOSIS — R51.9 NONINTRACTABLE HEADACHE, UNSPECIFIED CHRONICITY PATTERN, UNSPECIFIED HEADACHE TYPE: ICD-10-CM

## 2019-03-04 DIAGNOSIS — K08.89 PAIN, DENTAL: ICD-10-CM

## 2019-03-04 PROCEDURE — 96374 THER/PROPH/DIAG INJ IV PUSH: CPT

## 2019-03-04 PROCEDURE — 25000128 H RX IP 250 OP 636: Performed by: EMERGENCY MEDICINE

## 2019-03-04 PROCEDURE — 96375 TX/PRO/DX INJ NEW DRUG ADDON: CPT

## 2019-03-04 PROCEDURE — 99284 EMERGENCY DEPT VISIT MOD MDM: CPT | Mod: 25

## 2019-03-04 PROCEDURE — 96361 HYDRATE IV INFUSION ADD-ON: CPT

## 2019-03-04 RX ORDER — DIPHENHYDRAMINE HYDROCHLORIDE 50 MG/ML
25 INJECTION INTRAMUSCULAR; INTRAVENOUS ONCE
Status: COMPLETED | OUTPATIENT
Start: 2019-03-04 | End: 2019-03-04

## 2019-03-04 RX ORDER — CLINDAMYCIN HCL 300 MG
300 CAPSULE ORAL 4 TIMES DAILY
Qty: 40 CAPSULE | Refills: 0 | Status: SHIPPED | OUTPATIENT
Start: 2019-03-04 | End: 2019-03-19

## 2019-03-04 RX ORDER — KETOROLAC TROMETHAMINE 15 MG/ML
15 INJECTION, SOLUTION INTRAMUSCULAR; INTRAVENOUS ONCE
Status: COMPLETED | OUTPATIENT
Start: 2019-03-04 | End: 2019-03-04

## 2019-03-04 RX ADMIN — DIPHENHYDRAMINE HYDROCHLORIDE 25 MG: 50 INJECTION, SOLUTION INTRAMUSCULAR; INTRAVENOUS at 10:39

## 2019-03-04 RX ADMIN — PROCHLORPERAZINE EDISYLATE 5 MG: 5 INJECTION INTRAMUSCULAR; INTRAVENOUS at 10:40

## 2019-03-04 RX ADMIN — SODIUM CHLORIDE 1000 ML: 9 INJECTION, SOLUTION INTRAVENOUS at 10:43

## 2019-03-04 RX ADMIN — KETOROLAC TROMETHAMINE 15 MG: 15 INJECTION, SOLUTION INTRAMUSCULAR; INTRAVENOUS at 10:39

## 2019-03-04 ASSESSMENT — ENCOUNTER SYMPTOMS
DIARRHEA: 0
ABDOMINAL PAIN: 0
SHORTNESS OF BREATH: 0
FEVER: 0
VOMITING: 0
WEAKNESS: 0
HEADACHES: 1
NUMBNESS: 0

## 2019-03-04 ASSESSMENT — MIFFLIN-ST. JEOR: SCORE: 2380.58

## 2019-03-04 NOTE — DISCHARGE INSTRUCTIONS
Discharge Instructions  Headache    You were seen today for a headache. Headaches may be caused by many different things such as muscle tension, sinus inflammation, anxiety and stress, having too little sleep, too much alcohol, some medical conditions or injury. You may have a migraine, which is caused by changes in the blood vessels in your head.  At this time your provider does not find that your headache is a sign of anything dangerous or life-threatening.  However, sometimes the signs of serious illness do not show up right away.      Generally, every Emergency Department visit should have a follow-up clinic visit with either a primary or a specialty clinic/provider. Please follow-up as instructed by your emergency provider today.    Return to the Emergency Department if:  You get a new fever of 100.4 F or higher.  Your headache gets much worse.  You get a stiff neck with your headache.  You get a new headache that is significantly different or worse than headaches you have had before.  You are vomiting (throwing up) and cannot keep food or water down.  You have blurry or double vision or other problems with your eyes.  You have a new weakness on one side of your body.  You have difficulty with balance which is new.  You or your family thinks you are confused.  You have a seizure.    What can I do to help myself?  Pain medications - You may take a pain medication such as Tylenol  (acetaminophen), Advil , Motrin  (ibuprofen) or Aleve  (naproxen).  Take a pain reliever as soon as you notice symptoms.  Starting medications as soon as you start to have symptoms may lessen the amount of pain you have.  Relaxing in a quiet, dark room may help.  Get enough sleep and eat meals regularly.  You may need to watch for certain foods or other things which may trigger your headaches.  Keeping a journal of your headaches and possible triggers may help you and your primary provider to identify things which you should avoid which  may be causing your headaches.  If you were given a prescription for medicine here today, be sure to read all of the information (including the package insert) that comes with your prescription.  This will include important information about the medicine, its side effects, and any warnings that you need to know about.  The pharmacist who fills the prescription can provide more information and answer questions you may have about the medicine.  If you have questions or concerns that the pharmacist cannot address, please call or return to the Emergency Department.   Remember that you can always come back to the Emergency Department if you are not able to see your regular provider in the amount of time listed above, if you get any new symptoms, or if there is anything that worries you.    Discharge Instructions  Dental Pain    You have been seen today for a toothache. Your pain may be caused by an exposed nerve, an infection (pulpitis), a root abscess (pocket of pus), or other problems. You will need to see a dentist for a solution to your tooth problem. Emergency Department care is only to help control your problem until you can see a dentist; we cannot provide complete dental care.  Today, we did not find any sign that your toothache was caused by any dangerous or life-threatening condition, but sometimes symptoms develop over time and cannot be found during an emergency visit, so it is very important that you follow up with your dentist.      Generally, every Emergency Department visit should have a follow-up clinic visit with either a primary or a specialty clinic/provider. Please follow-up as instructed by your emergency provider today.    Return to the Emergency Department if:  You develop a new fever over 100.4 F.  You cannot open your mouth normally, cannot move your tongue well, or cannot swallow.  You have new or increased swelling of your face or neck.  You develop drainage of pus or foul smelling material  from around your tooth.  What can I do to help myself?  Take any antibiotic the provider may have prescribed for you today.  Avoid very hot or very cold foods as both can cause pain.  Make an appointment to see a dentist as soon as possible. Dentists are generally not ?on-staff? at hospitals so we cannot ?refer? to you to dentist but we may be able to provide a list of dental clinics to help you.  If you were given a prescription for medicine here today, be sure to read all of the information (including the package insert) that comes with your prescription.  This will include important information about the medicine, its side effects, and any warnings that you need to know about.  The pharmacist who fills the prescription can provide more information and answer questions you may have about the medicine.  If you have questions or concerns that the pharmacist cannot address, please call or return to the Emergency Department.   Remember that you can always come back to the Emergency Department if you are not able to see your regular provider in the amount of time listed above, if you get any new symptoms, or if there is anything that worries you.

## 2019-03-04 NOTE — ED PROVIDER NOTES
History     Chief Complaint:  Headache    HPI   Olga Sheehan is a 29 year old female with a history of bipolar affective disorder who presents to the emergency department today for evaluation of headache. Patient states she recently switched from Latuda to Tegretol medication for her bipolar 2 weeks ago. She endorses a waxing and waning frontal headache that wraps around towards the back for the past 5 days that is progressively worsening. The patient states that she typically gets a headache once a week, but it is typically alleviated within a couple hours. The patient tried calling the nurse line at her psychiatrist but there was no answer which prompted her to come to the ED. She endorses that she was told not to take ibuprofen while on Tegretol, so she has been taking Tylenol and it has not been giving her any relief. She denies fever, vomiting, diarrhea, vision changes, numbness, weakness, chest pain, abdominal pain, or shortness of breath.    Allergies:  Lamictal  Ceclor  Penicillins     Medications:    Albuterol  Tegretol     Past Medical History:    Anxiety and depression  Asthma  Bipolar affective disorder  Obese  Sleep apnea    Past Surgical History:    ENT surgery  Exam under anesthesia rectum  Incision and drainage rectum  Placement of seton rectum  [place] teeth extraction    Family History:    Father: drug abuse, bipolar, depression    Social History:  The patient was accompanied to the ED by herself.  Smoking Status: Current every day smoker  Smokeless Tobacco: Never Used  Alcohol Use: Positive  Drug Use: Negative  Marital Status:  Single     Review of Systems   Constitutional: Negative for fever.   Eyes: Negative for visual disturbance.   Respiratory: Negative for shortness of breath.    Cardiovascular: Negative for chest pain.   Gastrointestinal: Negative for abdominal pain, diarrhea and vomiting.   Neurological: Positive for headaches. Negative for weakness and numbness.   All other systems  "reviewed and are negative.      Physical Exam     Patient Vitals for the past 24 hrs:   BP Temp Temp src Pulse Resp SpO2 Height Weight   03/04/19 1240 117/73 -- -- 64 -- -- -- --   03/04/19 1137 143/75 -- -- 68 18 98 % -- --   03/04/19 1136 -- -- -- -- -- 98 % -- --   03/04/19 1135 143/75 -- -- 68 -- -- -- --   03/04/19 1013 154/89 98.2  F (36.8  C) Temporal 80 18 97 % 1.702 m (5' 7\") (!) 163.3 kg (360 lb)       Physical Exam  VS: Reviewed per above  HENT: Mucous membranes moist, no nuchal rigidity. R maxillary premolar chipped without surrounding fluctuance on gumline, trismus, facial swelling. Tooth is ttp.  Floor of mouth is soft.  Normal phonation, tolerating secretions.  EYES: sclera anicteric  CV: Rate as noted, regular rhythm.   RESP: Effort normal. Breath sounds are normal bilaterally.  GI: no tenderness, not distended.  NEURO: Alert, moving all extremities with 5 out of 5 strength, sensation intact to light touch in all 4 extremities, cranial nerves II through XII are intact, GCS 15.  MSK: No deformity of the extremities  SKIN: Warm and dry      Emergency Department Course     Interventions:  1039 Toradol 15 mg IV  1039 Benadryl 25 mg IV  1040 Compazine 5 mg IV  1043 NS 1000 ml IV      Emergency Department Course:    1019 Nursing notes and vitals reviewed.    1024 I performed an exam of the patient as documented above.     1234 Patient rechecked and updated.     1250 I personally answered all related questions prior to discharge.    Impression & Plan      Medical Decision Making:  Olga Sheehan is a 29 year old female who presents to the emergency department today for evaluation of headache.  Initial vital signs are unremarkable.  Exam does not reveal neurological deficits to suggest occult stroke or increased intracranial pressure.  It was not sudden in onset to suggest occult aneurysmal bleed.  She has no fever or nuchal rigidity to suggest meningitis.  Symptoms did improve with IV Toradol, Compazine, " Benadryl, IV fluids.  I instructed her to follow-up with her psychiatrist regarding any psychiatric medication changes that may be implicated in her headaches recently.  Patient declined pregnancy testing while in the ER.  She also mentioned a right maxillary premolar that was tender and chipped.  On exam there is no evidence of periapical abscess or facial cellulitis.  Plan to treat for occult dental carry with clindamycin due to penicillin allergy.  Recommended she follow-up with her dentist.  Close return precautions were discussed.    Diagnosis:    ICD-10-CM    1. Nonintractable headache, unspecified chronicity pattern, unspecified headache type R51    2. Pain, dental K08.89      Disposition:   The patient is discharged to home.    Discharge Medications:     START taking      Dose / Directions   clindamycin 300 MG capsule  Commonly known as:  CLEOCIN      Dose:  300 mg  Take 1 capsule (300 mg) by mouth 4 times daily for 10 days  Quantity:  40 capsule  Refills:  0           Where to get your medicines      Some of these will need a paper prescription and others can be bought over the counter. Ask your nurse if you have questions.    Bring a paper prescription for each of these medications    clindamycin 300 MG capsule         Scribe Disclosure:  Brii CHARLES, am serving as a scribe at 10:21 AM on 3/4/2019 to document services personally performed by Ronaldo Quiles MD based on my observations and the provider's statements to me.    [facility] EMERGENCY DEPARTMENT       Ronaldo Quiles MD  03/04/19 4430

## 2019-03-04 NOTE — ED TRIAGE NOTES
ABCs intact. Pt c/o headache x 5 days. Pt recently switched medications from latuda to tegretol about 2 weeks ago. Pt c/o headache and nausea.

## 2019-03-04 NOTE — ED AVS SNAPSHOT
[facility] Emergency Department  [address]  [facility] [address]  Phone:  [phone]  Fax:  [phone]                                    Olga Sheehan   MRN: [id number]    Department:  [facility] Emergency Department   Date of Visit:  3/4/2019           After Visit Summary Signature Page    I have received my discharge instructions, and my questions have been answered. I have discussed any challenges I see with this plan with the nurse or doctor.    ..........................................................................................................................................  Patient/Patient Representative Signature      ..........................................................................................................................................  Patient Representative Print Name and Relationship to Patient    ..................................................               ................................................  Date                                   Time    ..........................................................................................................................................  Reviewed by Signature/Title    ...................................................              ..............................................  Date                                               Time          22EPIC Rev 08/18

## 2019-03-05 ENCOUNTER — TRANSFERRED RECORDS (OUTPATIENT)
Dept: HEALTH INFORMATION MANAGEMENT | Facility: CLINIC | Age: 29
End: 2019-03-05

## 2019-03-19 ENCOUNTER — OFFICE VISIT (OUTPATIENT)
Dept: PEDIATRICS | Facility: CLINIC | Age: 29
End: 2019-03-19
Payer: COMMERCIAL

## 2019-03-19 VITALS
SYSTOLIC BLOOD PRESSURE: 130 MMHG | TEMPERATURE: 98.2 F | HEART RATE: 86 BPM | DIASTOLIC BLOOD PRESSURE: 72 MMHG | WEIGHT: 293 LBS | BODY MASS INDEX: 45.99 KG/M2 | OXYGEN SATURATION: 98 % | HEIGHT: 67 IN

## 2019-03-19 DIAGNOSIS — E66.01 MORBID OBESITY (H): ICD-10-CM

## 2019-03-19 DIAGNOSIS — K60.40 RECTAL FISTULA: ICD-10-CM

## 2019-03-19 DIAGNOSIS — F31.32 MODERATE DEPRESSED BIPOLAR I DISORDER (H): ICD-10-CM

## 2019-03-19 DIAGNOSIS — Z01.818 PREOP GENERAL PHYSICAL EXAM: Primary | ICD-10-CM

## 2019-03-19 PROCEDURE — 99214 OFFICE O/P EST MOD 30 MIN: CPT | Performed by: FAMILY MEDICINE

## 2019-03-19 RX ORDER — CARBAMAZEPINE 200 MG/1
1000 TABLET ORAL DAILY
COMMUNITY
Start: 2019-03-19 | End: 2024-05-08

## 2019-03-19 RX ORDER — CARBAMAZEPINE 200 MG/1
TABLET ORAL
COMMUNITY
Start: 2019-03-16 | End: 2019-03-19

## 2019-03-19 ASSESSMENT — MIFFLIN-ST. JEOR: SCORE: 2469.94

## 2019-03-19 NOTE — PROGRESS NOTES
[facility]  [address]  [address]  [place] [address]  [phone]  Dept: [phone]    PRE-OP EVALUATION:  Today's date: 3/19/2019    Olga Sheehan (: 1990) presents for pre-operative evaluation assessment as requested by Dr. Dr Camilo.  She requires evaluation and anesthesia risk assessment prior to undergoing surgery/procedure for treatment of colon and rectal .    Fax number for surgical facility: [facility]  Primary Physician: No Ref-Primary, Physician  Type of Anesthesia Anticipated: General    Patient has a Health Care Directive or Living Will:  NO    Preop Questions 3/19/2019   Who is doing your surgery? dr camilo colon and rectal   What are you having done? fisulotomy   Date of Surgery/Procedure: -3/28/19   Facility or Hospital where procedure/surgery will be performed: [facility]   1.  Do you have a history of Heart attack, stroke, stent, coronary bypass surgery, or other heart surgery? No   2.  Do you ever have any pain or discomfort in your chest? No   3.  Do you have a history of  Heart Failure? No   4.   Are you troubled by shortness of breath when:  walking on a level surface, or up a slight hill, or at night? No   5.  Do you currently have a cold, bronchitis or other respiratory infection? No   6.  Do you have a cough, shortness of breath, or wheezing? No   7.  Do you sometimes get pains in the calves of your legs when you walk? No   8. Do you or anyone in your family have previous history of blood clots? No   9.  Do you or does anyone in your family have a serious bleeding problem such as prolonged bleeding following surgeries or cuts? No   10. Have you ever had problems with anemia or been told to take iron pills? No   11. Have you had any abnormal blood loss such as black, tarry or bloody stools, or abnormal vaginal bleeding? No   12. Have you ever had a blood transfusion? No   13. Have you or any of your relatives ever had problems with  anesthesia? No   14. Do you have sleep apnea, excessive snoring or daytime drowsiness? YES - uses CPAP   15. Do you have any prosthetic heart valves? No   16. Do you have prosthetic joints? No   17. Is there any chance that you may be pregnant? No         HPI:     HPI related to upcoming procedure:     Olga had a perirectal abscess with complication of anal fistula. She is seeing colon and Rectal MD. Planning more definitive procedure.          No h/o Anesthesia complications.    MEDICAL HISTORY:     Patient Active Problem List    Diagnosis Date Noted     Intermittent asthma 03/01/2018     Priority: Medium     Overview:   Triggered by exercise, extreme heat/cold, illness       Obesity 03/01/2018     Priority: Medium     Fatty liver 05/21/2015     Priority: Medium     Moderate depressed bipolar I disorder (H) 07/02/2014     Priority: Medium     Post traumatic stress disorder (PTSD) 07/02/2014     Priority: Medium      Past Medical History:   Diagnosis Date     Anxiety and depression      Asthma      Bipolar affective disorder (H)      Obese      Sleep apnea     CPAP     Past Surgical History:   Procedure Laterality Date     ENT SURGERY       EXAM UNDER ANESTHESIA RECTUM N/A 11/21/2018    Procedure: EXAM UNDER ANESTHESIA;  Surgeon: Chanel Parker MD;  Location: [facility]     INCISION AND DRAINAGE RECTUM, COMBINED N/A 6/28/2018    Procedure: COMBINED INCISION AND DRAINAGE RECTUM;  Incision and drainage of left ischiorectal fossa abscess;  Surgeon: Dave Davis MD;  Location:  OR     PLACEMENT OF SETON RECTUM N/A 11/21/2018    Procedure: PLACEMENT OF SETON RECTUM;  Surgeon: Chanel Parker MD;  Location: [facility]     Current Outpatient Medications   Medication Sig Dispense Refill     ALBUTEROL INHALER 17GM None Entered       Ascorbic Acid (VITAMIN C PO)        carBAMazepine (TEGRETOL) 200 MG tablet Take 1 tablet (200 mg) by mouth 2 times daily       Cholecalciferol (VITAMIN D3) 3000 units TABS         MELATONIN PO        Multiple Vitamins-Minerals (MULTIVITAMIN PO)        ibuprofen (ADVIL/MOTRIN) 600 MG tablet Take 1 tablet (600 mg) by mouth every 8 hours as needed for moderate pain (Patient not taking: Reported on 11/15/2018) 30 tablet 0     lidocaine (XYLOCAINE) 2 % topical gel Apply topically as needed for moderate pain Apply to anus qid as needed for pain (Patient not taking: Reported on 11/15/2018) 30 mL 3     polyethylene glycol (MIRALAX) powder Take 17 g (1 capful) by mouth daily Mix with 8 ounces of water or other liquid (Patient not taking: Reported on 3/19/2019) 510 g 1     OTC products: no recent use of OTC ASA, NSAIDS or Steroids    Allergies   Allergen Reactions     Lamictal [Lamotrigine]      Ceclor [Cefaclor] Rash     Penicillins Rash      Latex Allergy: NO    Social History     Tobacco Use     Smoking status: Current Some Day Smoker     Packs/day: 1.00     Smokeless tobacco: Never Used   Substance Use Topics     Alcohol use: Yes     Comment: 1-3/week     History   Drug Use No       REVIEW OF SYSTEMS:   Constitutional, neuro, ENT, endocrine, pulmonary, cardiac, gastrointestinal, genitourinary, musculoskeletal, integument and psychiatric systems are negative, except as otherwise noted.    EXAM:   [facility] 02/25/2019     GENERAL APPEARANCE: healthy, alert and no distress     EYES: EOMI, PERRL     HENT: ear canals and TM's normal and nose and mouth without ulcers or lesions     NECK: no adenopathy, no asymmetry, masses, or scars and thyroid normal to palpation     RESP: lungs clear to auscultation - no rales, rhonchi or wheezes     CV: regular rates and rhythm, normal S1 S2, no S3 or S4 and no murmur, click or rub     ABDOMEN:  soft, nontender, no HSM or masses and bowel sounds normal     MS: extremities normal- no gross deformities noted, no evidence of inflammation in joints, FROM in all extremities.     SKIN: no suspicious lesions or rashes     NEURO: Normal strength and tone, sensory exam grossly  normal, mentation intact and speech normal     PSYCH: mentation appears normal. and affect normal/bright     LYMPHATICS: No cervical adenopathy    DIAGNOSTICS:   No labs or EKG required for low risk surgery (cataract, skin procedure, breast biopsy, etc)    Recent Labs   Lab Test 11/07/18  2301 10/22/18  2348  02/21/18  2125   HGB 13.1 12.8   < > 13.9    306   < > 308   INR  --   --   --  1.00    140   < > 139   POTASSIUM 3.7 3.9   < > 3.9   CR 0.81 0.83   < > 0.80    < > = values in this interval not displayed.        IMPRESSION:   Diagnosis/reason for consult:       (Z01.818) Preop general physical exam  (primary encounter diagnosis)  Comment:   Plan:     (K60.4) Rectal fistula  Comment:   Plan:       (E66.01) Morbid obesity (H)  Comment:   Plan:       (F31.32) Moderate depressed bipolar I disorder (H)  Comment:   Plan:           The proposed surgical procedure is considered INTERMEDIATE risk.    REVISED CARDIAC RISK INDEX  The patient has the following serious cardiovascular risks for perioperative complications such as (MI, PE, VFib and 3  AV Block):  No serious cardiac risks  INTERPRETATION: 1 risks: Class II (low risk - 0.9% complication rate)    The patient has the following additional risks for perioperative complications:  Morbid obesity  MALINA      ICD-10-CM    1. Preop general physical exam Z01.818    2. Rectal fistula K60.4    3. Morbid obesity (H) E66.01    4. Moderate depressed bipolar I disorder (H) F31.32        RECOMMENDATIONS:         APPROVAL GIVEN to proceed with proposed procedure, without further diagnostic evaluation       Signed Electronically by: Jeffrey Venegas MD    Copy of this evaluation report is provided to requesting physician.    [facility] Preop Guidelines    Revised Cardiac Risk Index

## 2019-03-28 ENCOUNTER — HOSPITAL ENCOUNTER (OUTPATIENT)
Facility: CLINIC | Age: 29
Discharge: HOME | End: 2019-03-28
Attending: COLON & RECTAL SURGERY | Admitting: COLON & RECTAL SURGERY
Payer: COMMERCIAL

## 2019-03-28 ENCOUNTER — ANESTHESIA EVENT (OUTPATIENT)
Dept: SURGERY | Facility: CLINIC | Age: 29
End: 2019-03-28
Payer: COMMERCIAL

## 2019-03-28 ENCOUNTER — ANESTHESIA (OUTPATIENT)
Dept: SURGERY | Facility: CLINIC | Age: 29
End: 2019-03-28
Payer: COMMERCIAL

## 2019-03-28 VITALS
BODY MASS INDEX: 45.99 KG/M2 | HEART RATE: 70 BPM | TEMPERATURE: 97.7 F | WEIGHT: 293 LBS | HEIGHT: 67 IN | OXYGEN SATURATION: 94 % | SYSTOLIC BLOOD PRESSURE: 104 MMHG | RESPIRATION RATE: 18 BRPM | DIASTOLIC BLOOD PRESSURE: 65 MMHG

## 2019-03-28 DIAGNOSIS — K60.30 ANAL FISTULA: Primary | ICD-10-CM

## 2019-03-28 PROCEDURE — 71000014 ZZH RECOVERY PHASE 1 LEVEL 2 FIRST HR: Performed by: COLON & RECTAL SURGERY

## 2019-03-28 PROCEDURE — 25800030 ZZH RX IP 258 OP 636: Performed by: ANESTHESIOLOGY

## 2019-03-28 PROCEDURE — 25000125 ZZHC RX 250: Performed by: NURSE ANESTHETIST, CERTIFIED REGISTERED

## 2019-03-28 PROCEDURE — 25000128 H RX IP 250 OP 636: Performed by: NURSE ANESTHETIST, CERTIFIED REGISTERED

## 2019-03-28 PROCEDURE — 25800030 ZZH RX IP 258 OP 636: Performed by: COLON & RECTAL SURGERY

## 2019-03-28 PROCEDURE — 36000050 ZZH SURGERY LEVEL 2 1ST 30 MIN: Performed by: COLON & RECTAL SURGERY

## 2019-03-28 PROCEDURE — 40000306 ZZH STATISTIC PRE PROC ASSESS II: Performed by: COLON & RECTAL SURGERY

## 2019-03-28 PROCEDURE — 25000132 ZZH RX MED GY IP 250 OP 250 PS 637: Performed by: COLON & RECTAL SURGERY

## 2019-03-28 PROCEDURE — 37000009 ZZH ANESTHESIA TECHNICAL FEE, EACH ADDTL 15 MIN: Performed by: COLON & RECTAL SURGERY

## 2019-03-28 PROCEDURE — 37000008 ZZH ANESTHESIA TECHNICAL FEE, 1ST 30 MIN: Performed by: COLON & RECTAL SURGERY

## 2019-03-28 PROCEDURE — 25000128 H RX IP 250 OP 636: Performed by: ANESTHESIOLOGY

## 2019-03-28 PROCEDURE — 25000125 ZZHC RX 250: Performed by: COLON & RECTAL SURGERY

## 2019-03-28 PROCEDURE — 36000052 ZZH SURGERY LEVEL 2 EA 15 ADDTL MIN: Performed by: COLON & RECTAL SURGERY

## 2019-03-28 PROCEDURE — 27210794 ZZH OR GENERAL SUPPLY STERILE: Performed by: COLON & RECTAL SURGERY

## 2019-03-28 PROCEDURE — 25000128 H RX IP 250 OP 636: Performed by: COLON & RECTAL SURGERY

## 2019-03-28 PROCEDURE — 71000027 ZZH RECOVERY PHASE 2 EACH 15 MINS: Performed by: COLON & RECTAL SURGERY

## 2019-03-28 RX ORDER — METOCLOPRAMIDE HYDROCHLORIDE 5 MG/ML
10 INJECTION INTRAMUSCULAR; INTRAVENOUS EVERY 6 HOURS PRN
Status: DISCONTINUED | OUTPATIENT
Start: 2019-03-28 | End: 2019-03-28 | Stop reason: HOSPADM

## 2019-03-28 RX ORDER — PROPOFOL 10 MG/ML
INJECTION, EMULSION INTRAVENOUS PRN
Status: DISCONTINUED | OUTPATIENT
Start: 2019-03-28 | End: 2019-03-28

## 2019-03-28 RX ORDER — DIMENHYDRINATE 50 MG/ML
25 INJECTION, SOLUTION INTRAMUSCULAR; INTRAVENOUS
Status: DISCONTINUED | OUTPATIENT
Start: 2019-03-28 | End: 2019-03-28 | Stop reason: HOSPADM

## 2019-03-28 RX ORDER — LIDOCAINE HYDROCHLORIDE 10 MG/ML
INJECTION, SOLUTION INFILTRATION; PERINEURAL PRN
Status: DISCONTINUED | OUTPATIENT
Start: 2019-03-28 | End: 2019-03-28

## 2019-03-28 RX ORDER — ACETAMINOPHEN 325 MG/1
975 TABLET ORAL ONCE
Status: COMPLETED | OUTPATIENT
Start: 2019-03-28 | End: 2019-03-28

## 2019-03-28 RX ORDER — NALOXONE HYDROCHLORIDE 0.4 MG/ML
.1-.4 INJECTION, SOLUTION INTRAMUSCULAR; INTRAVENOUS; SUBCUTANEOUS
Status: DISCONTINUED | OUTPATIENT
Start: 2019-03-28 | End: 2019-03-28 | Stop reason: HOSPADM

## 2019-03-28 RX ORDER — SODIUM CHLORIDE, SODIUM LACTATE, POTASSIUM CHLORIDE, CALCIUM CHLORIDE 600; 310; 30; 20 MG/100ML; MG/100ML; MG/100ML; MG/100ML
INJECTION, SOLUTION INTRAVENOUS CONTINUOUS
Status: DISCONTINUED | OUTPATIENT
Start: 2019-03-28 | End: 2019-03-28 | Stop reason: HOSPADM

## 2019-03-28 RX ORDER — FENTANYL CITRATE 50 UG/ML
INJECTION, SOLUTION INTRAMUSCULAR; INTRAVENOUS PRN
Status: DISCONTINUED | OUTPATIENT
Start: 2019-03-28 | End: 2019-03-28

## 2019-03-28 RX ORDER — LABETALOL 20 MG/4 ML (5 MG/ML) INTRAVENOUS SYRINGE
10
Status: DISCONTINUED | OUTPATIENT
Start: 2019-03-28 | End: 2019-03-28 | Stop reason: HOSPADM

## 2019-03-28 RX ORDER — DEXAMETHASONE SODIUM PHOSPHATE 4 MG/ML
INJECTION, SOLUTION INTRA-ARTICULAR; INTRALESIONAL; INTRAMUSCULAR; INTRAVENOUS; SOFT TISSUE PRN
Status: DISCONTINUED | OUTPATIENT
Start: 2019-03-28 | End: 2019-03-28

## 2019-03-28 RX ORDER — NEOSTIGMINE METHYLSULFATE 1 MG/ML
VIAL (ML) INJECTION PRN
Status: DISCONTINUED | OUTPATIENT
Start: 2019-03-28 | End: 2019-03-28

## 2019-03-28 RX ORDER — BUPIVACAINE HYDROCHLORIDE 2.5 MG/ML
INJECTION, SOLUTION INFILTRATION; PERINEURAL PRN
Status: DISCONTINUED | OUTPATIENT
Start: 2019-03-28 | End: 2019-03-28 | Stop reason: HOSPADM

## 2019-03-28 RX ORDER — OXYCODONE HYDROCHLORIDE 5 MG/1
5 TABLET ORAL
Status: COMPLETED | OUTPATIENT
Start: 2019-03-28 | End: 2019-03-28

## 2019-03-28 RX ORDER — DEXAMETHASONE SODIUM PHOSPHATE 4 MG/ML
4 INJECTION, SOLUTION INTRA-ARTICULAR; INTRALESIONAL; INTRAMUSCULAR; INTRAVENOUS; SOFT TISSUE EVERY 10 MIN PRN
Status: DISCONTINUED | OUTPATIENT
Start: 2019-03-28 | End: 2019-03-28 | Stop reason: HOSPADM

## 2019-03-28 RX ORDER — GLYCOPYRROLATE 0.2 MG/ML
INJECTION, SOLUTION INTRAMUSCULAR; INTRAVENOUS PRN
Status: DISCONTINUED | OUTPATIENT
Start: 2019-03-28 | End: 2019-03-28

## 2019-03-28 RX ORDER — MEPERIDINE HYDROCHLORIDE 50 MG/ML
12.5 INJECTION INTRAMUSCULAR; INTRAVENOUS; SUBCUTANEOUS
Status: DISCONTINUED | OUTPATIENT
Start: 2019-03-28 | End: 2019-03-28 | Stop reason: HOSPADM

## 2019-03-28 RX ORDER — OXYCODONE HYDROCHLORIDE 5 MG/1
5-10 TABLET ORAL EVERY 6 HOURS PRN
Qty: 30 TABLET | Refills: 0 | Status: SHIPPED | OUTPATIENT
Start: 2019-03-28 | End: 2019-09-10

## 2019-03-28 RX ORDER — ONDANSETRON 2 MG/ML
INJECTION INTRAMUSCULAR; INTRAVENOUS PRN
Status: DISCONTINUED | OUTPATIENT
Start: 2019-03-28 | End: 2019-03-28

## 2019-03-28 RX ORDER — FENTANYL CITRATE 50 UG/ML
25-50 INJECTION, SOLUTION INTRAMUSCULAR; INTRAVENOUS
Status: DISCONTINUED | OUTPATIENT
Start: 2019-03-28 | End: 2019-03-28 | Stop reason: HOSPADM

## 2019-03-28 RX ORDER — ONDANSETRON 4 MG/1
4 TABLET, ORALLY DISINTEGRATING ORAL EVERY 30 MIN PRN
Status: DISCONTINUED | OUTPATIENT
Start: 2019-03-28 | End: 2019-03-28 | Stop reason: HOSPADM

## 2019-03-28 RX ORDER — LIDOCAINE 40 MG/G
CREAM TOPICAL
Status: DISCONTINUED | OUTPATIENT
Start: 2019-03-28 | End: 2019-03-28 | Stop reason: HOSPADM

## 2019-03-28 RX ORDER — METOCLOPRAMIDE 10 MG/1
10 TABLET ORAL EVERY 6 HOURS PRN
Status: DISCONTINUED | OUTPATIENT
Start: 2019-03-28 | End: 2019-03-28 | Stop reason: HOSPADM

## 2019-03-28 RX ORDER — ONDANSETRON 2 MG/ML
4 INJECTION INTRAMUSCULAR; INTRAVENOUS EVERY 30 MIN PRN
Status: DISCONTINUED | OUTPATIENT
Start: 2019-03-28 | End: 2019-03-28 | Stop reason: HOSPADM

## 2019-03-28 RX ORDER — HYDROMORPHONE HYDROCHLORIDE 1 MG/ML
.3-.5 INJECTION, SOLUTION INTRAMUSCULAR; INTRAVENOUS; SUBCUTANEOUS EVERY 10 MIN PRN
Status: DISCONTINUED | OUTPATIENT
Start: 2019-03-28 | End: 2019-03-28 | Stop reason: HOSPADM

## 2019-03-28 RX ORDER — HYDRALAZINE HYDROCHLORIDE 20 MG/ML
2.5-5 INJECTION INTRAMUSCULAR; INTRAVENOUS EVERY 10 MIN PRN
Status: DISCONTINUED | OUTPATIENT
Start: 2019-03-28 | End: 2019-03-28 | Stop reason: HOSPADM

## 2019-03-28 RX ORDER — KETOROLAC TROMETHAMINE 30 MG/ML
30 INJECTION, SOLUTION INTRAMUSCULAR; INTRAVENOUS EVERY 6 HOURS PRN
Status: DISCONTINUED | OUTPATIENT
Start: 2019-03-28 | End: 2019-03-28 | Stop reason: HOSPADM

## 2019-03-28 RX ADMIN — Medication 0.5 MG: at 12:30

## 2019-03-28 RX ADMIN — FENTANYL CITRATE 100 MCG: 50 INJECTION, SOLUTION INTRAMUSCULAR; INTRAVENOUS at 11:01

## 2019-03-28 RX ADMIN — LIDOCAINE HYDROCHLORIDE 50 MG: 10 INJECTION, SOLUTION INFILTRATION; PERINEURAL at 10:51

## 2019-03-28 RX ADMIN — ONDANSETRON 4 MG: 2 INJECTION INTRAMUSCULAR; INTRAVENOUS at 13:17

## 2019-03-28 RX ADMIN — FENTANYL CITRATE 50 MCG: 50 INJECTION, SOLUTION INTRAMUSCULAR; INTRAVENOUS at 12:22

## 2019-03-28 RX ADMIN — HYDROMORPHONE HYDROCHLORIDE 0.5 MG: 1 INJECTION, SOLUTION INTRAMUSCULAR; INTRAVENOUS; SUBCUTANEOUS at 11:38

## 2019-03-28 RX ADMIN — FENTANYL CITRATE 150 MCG: 50 INJECTION, SOLUTION INTRAMUSCULAR; INTRAVENOUS at 10:51

## 2019-03-28 RX ADMIN — KETOROLAC TROMETHAMINE 30 MG: 30 INJECTION, SOLUTION INTRAMUSCULAR at 11:53

## 2019-03-28 RX ADMIN — SODIUM CHLORIDE, POTASSIUM CHLORIDE, SODIUM LACTATE AND CALCIUM CHLORIDE: 600; 310; 30; 20 INJECTION, SOLUTION INTRAVENOUS at 10:08

## 2019-03-28 RX ADMIN — DEXAMETHASONE SODIUM PHOSPHATE 4 MG: 4 INJECTION, SOLUTION INTRA-ARTICULAR; INTRALESIONAL; INTRAMUSCULAR; INTRAVENOUS; SOFT TISSUE at 10:51

## 2019-03-28 RX ADMIN — ROCURONIUM BROMIDE 25 MG: 10 INJECTION INTRAVENOUS at 10:51

## 2019-03-28 RX ADMIN — MIDAZOLAM 2 MG: 1 INJECTION INTRAMUSCULAR; INTRAVENOUS at 10:35

## 2019-03-28 RX ADMIN — ONDANSETRON 4 MG: 2 INJECTION INTRAMUSCULAR; INTRAVENOUS at 11:03

## 2019-03-28 RX ADMIN — SODIUM CHLORIDE, POTASSIUM CHLORIDE, SODIUM LACTATE AND CALCIUM CHLORIDE: 600; 310; 30; 20 INJECTION, SOLUTION INTRAVENOUS at 11:29

## 2019-03-28 RX ADMIN — GLYCOPYRROLATE 1 MG: 0.2 INJECTION, SOLUTION INTRAMUSCULAR; INTRAVENOUS at 11:22

## 2019-03-28 RX ADMIN — FENTANYL CITRATE 50 MCG: 50 INJECTION, SOLUTION INTRAMUSCULAR; INTRAVENOUS at 11:58

## 2019-03-28 RX ADMIN — ACETAMINOPHEN 975 MG: 325 TABLET, FILM COATED ORAL at 10:08

## 2019-03-28 RX ADMIN — Medication 5 MG: at 11:23

## 2019-03-28 RX ADMIN — HYDROMORPHONE HYDROCHLORIDE 0.5 MG: 1 INJECTION, SOLUTION INTRAMUSCULAR; INTRAVENOUS; SUBCUTANEOUS at 11:11

## 2019-03-28 RX ADMIN — OXYCODONE HYDROCHLORIDE 5 MG: 5 TABLET ORAL at 12:37

## 2019-03-28 RX ADMIN — FENTANYL CITRATE 50 MCG: 50 INJECTION, SOLUTION INTRAMUSCULAR; INTRAVENOUS at 11:52

## 2019-03-28 RX ADMIN — GLYCOPYRROLATE 0.4 MG: 0.2 INJECTION, SOLUTION INTRAMUSCULAR; INTRAVENOUS at 10:51

## 2019-03-28 RX ADMIN — FENTANYL CITRATE 50 MCG: 50 INJECTION, SOLUTION INTRAMUSCULAR; INTRAVENOUS at 12:26

## 2019-03-28 RX ADMIN — PROPOFOL 250 MG: 10 INJECTION, EMULSION INTRAVENOUS at 10:51

## 2019-03-28 ASSESSMENT — MIFFLIN-ST. JEOR: SCORE: 2448.74

## 2019-03-28 NOTE — ANESTHESIA POSTPROCEDURE EVALUATION
Patient: Olga Sheehan    Procedure(s):  Lift procedure    Diagnosis:Fistula  Diagnosis Additional Information: Fistula    Anesthesia Type:  General, ETT    Note:  Anesthesia Post Evaluation    Patient location during evaluation: PACU  Patient participation: Able to fully participate in evaluation  Level of consciousness: awake and alert  Pain management: adequate  Airway patency: patent  Cardiovascular status: acceptable  Respiratory status: acceptable  Hydration status: acceptable  PONV: controlled     Anesthetic complications: None          Last vitals:  Vitals:    03/28/19 1150 03/28/19 1200 03/28/19 1235   BP: (!) 143/91 145/89    Pulse: 65 63 90   Resp: 12 11 10   Temp:      SpO2:  100% 96%         Electronically Signed By: Clement Bautista MD  March 28, 2019  12:51 PM

## 2019-03-28 NOTE — BRIEF OP NOTE
[facility]    Brief Operative Note    Pre-operative diagnosis: Fistula  Post-operative diagnosis Transphincteric fistula  Procedure: Procedure(s):  Lift procedure  Surgeon: Surgeon(s) and Role:     * Chanel Parker MD - Primary      * Alexx Blackwood MD - UMD Fellow  Anesthesia: General   Estimated blood loss: Less than 10 ml  Drains: None  Specimens: * No specimens in log *  Findings:   Left anterior midline transphincteric fistula.  Complications: None.  Implants: None.

## 2019-03-28 NOTE — OR NURSING
"Patient refusing HCG test. States that \"I am a lesbian, currently menstruating and there is no chance I could be pregnant.\" Will update MD upon arrival.  "

## 2019-03-28 NOTE — ANESTHESIA PREPROCEDURE EVALUATION
Anesthesia Pre-Procedure Evaluation    Patient: Olga Sheehan   MRN: [id number] : 1990          Preoperative Diagnosis: Fistula    Procedure(s):  Lift procedure    Past Medical History:   Diagnosis Date     Anxiety and depression      Asthma      Bipolar affective disorder (H)      Obese      Sleep apnea     CPAP     Past Surgical History:   Procedure Laterality Date     ENT SURGERY       EXAM UNDER ANESTHESIA RECTUM N/A 2018    Procedure: EXAM UNDER ANESTHESIA;  Surgeon: Chanel Parker MD;  Location: [facility]     INCISION AND DRAINAGE RECTUM, COMBINED N/A 2018    Procedure: COMBINED INCISION AND DRAINAGE RECTUM;  Incision and drainage of left ischiorectal fossa abscess;  Surgeon: Dave Davis MD;  Location: [facility] OR     PLACEMENT OF SETON RECTUM N/A 2018    Procedure: PLACEMENT OF SETON RECTUM;  Surgeon: Chanel Parker MD;  Location: [facility]     Anesthesia Evaluation     .             ROS/MED HX    ENT/Pulmonary:     (+)sleep apnea, Intermittent asthma Treatment: Inhaler prn,  uses CPAP , . .    Neurologic:       Cardiovascular:  - neg cardiovascular ROS       METS/Exercise Tolerance:     Hematologic: Comments: Lab Test        11/07/18     10/22/18     06/28/18     02/21/18                       2301          2348          1238          2125          WBC          12.5*        9.8          10.7         10.8          HGB          13.1         12.8         13.0         13.9          MCV          89           90           89           88            PLT          342          306          247          308           INR           --           --           --          1.00           Lab Test        11/07/18     10/22/18     06/28/18                       2301          2348          1238          NA           139          140          137           POTASSIUM    3.7          3.9          3.7           CHLORIDE     107          109          105           CO2          25           26            26            BUN          11           11           6*            CR           0.81         0.83         0.77          ANIONGAP     7            5            6             JEFRY          8.7          8.8          8.5           GLC          97           113*         101*           - neg hematologic  ROS       Musculoskeletal:  - neg musculoskeletal ROS       GI/Hepatic:     (+) liver disease,       Renal/Genitourinary:  - ROS Renal section negative       Endo:     (+) Obesity, .      Psychiatric:     (+) psychiatric history anxiety, bipolar and depression      Infectious Disease:  - neg infectious disease ROS       Malignancy:      - no malignancy   Other:    (+) No chance of pregnancy C-spine cleared: N/A, no H/O Chronic Pain,no other significant disability   - neg other ROS                      Physical Exam  Normal systems: cardiovascular, pulmonary and dental    Airway   Mallampati: II  TM distance: >3 FB  Neck ROM: full    Dental     Cardiovascular       Pulmonary             Lab Results   Component Value Date    WBC 12.5 (H) 11/07/2018    HGB 13.1 11/07/2018    HCT 41.4 11/07/2018     11/07/2018     11/07/2018    POTASSIUM 3.7 11/07/2018    CHLORIDE 107 11/07/2018    CO2 25 11/07/2018    BUN 11 11/07/2018    CR 0.81 11/07/2018    GLC 97 11/07/2018    JEFRY 8.7 11/07/2018    MAG 2.2 10/22/2018    ALBUMIN 3.4 10/22/2018    PROTTOTAL 7.7 10/22/2018    ALT 46 10/22/2018    AST 18 10/22/2018    ALKPHOS 71 10/22/2018    BILITOTAL 0.3 10/22/2018    LIPASE 151 02/21/2018    PTT 28 02/21/2018    INR 1.00 02/21/2018    HCG Negative 11/21/2018    HCGS Negative 02/21/2018       Preop Vitals  BP Readings from Last 3 Encounters:   03/28/19 169/82   03/19/19 130/72   03/04/19 117/73    Pulse Readings from Last 3 Encounters:   03/19/19 86   03/04/19 64   11/21/18 78      Resp Readings from Last 3 Encounters:   03/28/19 18   03/04/19 18   11/21/18 16    SpO2 Readings from Last 3 Encounters:   03/28/19 99%   03/19/19  "98%   03/04/19 98%      Temp Readings from Last 1 Encounters:   03/28/19 97.3  F (36.3  C) (Temporal)    Ht Readings from Last 1 Encounters:   03/28/19 1.702 m (5' 7.01\")      Wt Readings from Last 1 Encounters:   03/28/19 (!) 170.1 kg (375 lb)    Estimated body mass index is 58.72 kg/m  as calculated from the following:    Height as of this encounter: 1.702 m (5' 7.01\").    Weight as of this encounter: 170.1 kg (375 lb).       Anesthesia Plan      History & Physical Review  History and physical reviewed and following examination; no interval change.    ASA Status:  3 .    NPO Status:  > 8 hours    Plan for General and ETT with Intravenous induction. Maintenance will be Balanced.    PONV prophylaxis:  Ondansetron (or other 5HT-3) and Dexamethasone or Solumedrol  Additional equipment: Videolaryngoscope      Postoperative Care  Postoperative pain management:  IV analgesics.      Consents  Anesthetic plan, risks, benefits and alternatives discussed with:  Patient.  Use of blood products discussed: Yes.   Use of blood products discussed with Patient.  Consented to blood products.  .                 Clement Bautista MD                    .  "

## 2019-03-28 NOTE — ANESTHESIA CARE TRANSFER NOTE
Patient: Olga Sheehan    Procedure(s):  Lift procedure    Diagnosis: Fistula  Diagnosis Additional Information: No value filed.    Anesthesia Type:   General, ETT     Note:  Airway :Face Mask  Patient transferred to:PACU  Comments:   Spontaneous respirations, oral suctioned, bilateral eye opening and hand grasps.  Extubated to FM O2 6lpm.  VSS to PACU.Handoff Report: Identifed the Patient, Identified the Reponsible Provider, Reviewed the pertinent medical history, Discussed the surgical course, Reviewed Intra-OP anesthesia mangement and issues during anesthesia, Set expectations for post-procedure period and Allowed opportunity for questions and acknowledgement of understanding      Vitals: (Last set prior to Anesthesia Care Transfer)    CRNA VITALS  3/28/2019 1103 - 3/28/2019 1146      3/28/2019             SpO2:  100 %    EKG:  Sinus rhythm                Electronically Signed By: NIALL Dejesus CRNA  March 28, 2019  11:46 AM

## 2019-03-28 NOTE — OP NOTE
OPERATIVE REPORT    DATE OF SURGERY: March 28, 2019    PREOPERATIVE DIAGNOSIS:  Left anterior Transsphincteric fistula-in-ano.    POSTOPERATIVE DIAGNOSIS:  Left anterior Transsphincteric fistula-in-ano.    OPERATION PERFORMED:  Ligation of intersphincteric fistula tract (LIFT).    SURGEON:  Cinda Parker M.D.    ASSISTANT:  Kevin Blackwood M.D.  ([facility] Colorectal Fellow)    ANESTHESIA:  General.    INDICATIONS:  Olga Sheehan is a 29 year old female who presented with a perirectal abscess in left anterior position requiring incision, drainage and subsequently developed a left anterior transsphincteric anal fistula.  An ultrasound was performed pre-operatively which demonstrated the fistula tract to involve ~25% of the muscle therefore a LIFT procedure was recommended.  She underwent placement of a non-cutting Seton approximately 12 weeks ago and will now undergo the LIFT procedure.    OPERATIVE FINDINGS:  The patient had an external opening in the left anterior position, located 1.5 cm from the anal verge.  The internal opening was located in the anterior midline position.  There was no evidence of acute inflammation or infection.  A non-cutting Seton was in place through the fistula tract.  The distal anal canal appeared normal without evidence of inflammation.     PROCEDURE IN DETAIL:  After informed consent was obtained, the patient was brought to the operating room where general anesthesia was induced without difficulty.  She was flipped into the well-padded prone jackknife position with She buttocks taped apart.  The perianal region was sterilely prepped and draped in usual fashion.  A Herman bivalve retractor was inserted into the anal canal, and the operative findings are noted above.    The previously placed seton was removed. A gently curved fistula probe was inserted into the external opening in the left anterior position and easily out the internal opening in the anterior midline.   A curvilinear incision was made in the skin using needle Bovie electrocautery within the intersphincteric groove overlying the fistula tract in the anterior midline.  The curvilinear incision occupied less than 30% of the anal circumference.  A [place] retractor with sharp hooks in the anoderm was inserted for exposure.  The incision was carried down through the subcutaneous tissue within the intersphincteric groove using needle Bovie electrocautery to reach the fistula tract, which could easily be palpated with the fistula probe in place.  The fistula tract was encircled using a right angle clamp,  the fistula within the intersphincteric groove from the surrounding tissue.  A 0 silk suture ligature was placed on the proximal aspect of the fistula tract as well as on the distal aspect of the tract.  The fistula tract was then divided using a Metzenbaum scissors with a right angle clamp in place.  No muscle was cut since the tract was divided within the intersphincteric groove.    The distal aspect of the fistula tract was then curetted vigorously using a small curette.  The distal aspect of the fistula tract was closed within the intersphincteric groove using 2-0 Vicryl figure-of-eight sutures.  The proximal aspect of the fistula tract was closed similarly with 2-0 vicryl figure-of eight sutures.  To ensure that the distal aspect of the fistula tract was ligated, the external opening was injected with dilute hydrogen peroxide with a small amount of bubbling deep within the incision.  An additional figure-of-eight 2-0 Vicryl suture was placed at the site of the bubbling.  Dilute hydrogen peroxide was again injected into the external opening revealing no bubbling within the incision.  The suture within the proximal aspect of the fistula tract was secured in place.  Dilute hydrogen peroxide was instilled into the internal opening using a 10 mL syringe with an angiocath revealing  bubbling within the  incision.  An additional 2-0 vicryl figure of eight suture was placed at the site of the bubbling.  Dilute hydrogen peroxide again was injected through the internal opening and no bubbling in the wound was seen.  The wound was irrigated with saline solution and hemostasis was adequate.    The Lone Star retractor was removed and the curvilinear incision was closed in 2 layers of running 3-0 Vicryl sutures in the dermal layer and running 3-0 Vicryl sutures to reapproximate the anoderm.  A solution of 0.5% Marcaine was instilled into the skin and subcutaneous tissue for prolonged postoperative analgesia.  Sterile gauze dressing were applied.    The patient tolerated the procedure well without complications.  Estimated blood loss was 10 mL.  I was present and scrubbed for the entire duration of the operation.  The patient was returned to the supine position and extubated in the operating room.  She was brought to the recovery room in stable condition.    EVAN NEIL MD

## 2019-03-28 NOTE — DISCHARGE INSTRUCTIONS
GENERAL ANESTHESIA OR SEDATION ADULT DISCHARGE INSTRUCTIONS   SPECIAL PRECAUTIONS FOR 24 HOURS AFTER SURGERY    IT IS NOT UNUSUAL TO FEEL LIGHT-HEADED OR FAINT, UP TO 24 HOURS AFTER SURGERY OR WHILE TAKING PAIN MEDICATION.  IF YOU HAVE THESE SYMPTOMS; SIT FOR A FEW MINUTES BEFORE STANDING AND HAVE SOMEONE ASSIST YOU WHEN YOU GET UP TO WALK OR USE THE BATHROOM.    YOU SHOULD REST AND RELAX FOR THE NEXT 24 HOURS AND YOU MUST MAKE ARRANGEMENTS TO HAVE SOMEONE STAY WITH YOU FOR AT LEAST 24 HOURS AFTER YOUR DISCHARGE.  AVOID HAZARDOUS AND STRENUOUS ACTIVITIES.  DO NOT MAKE IMPORTANT DECISIONS FOR 24 HOURS.    DO NOT DRIVE ANY VEHICLE OR OPERATE MECHANICAL EQUIPMENT FOR 24 HOURS FOLLOWING THE END OF YOUR SURGERY.  EVEN THOUGH YOU MAY FEEL NORMAL, YOUR REACTIONS MAY BE AFFECTED BY THE MEDICATION YOU HAVE RECEIVED.    DO NOT DRINK ALCOHOLIC BEVERAGES FOR 24 HOURS FOLLOWING YOUR SURGERY.    DRINK CLEAR LIQUIDS (APPLE JUICE, GINGER ALE, 7-UP, BROTH, ETC.).  PROGRESS TO YOUR REGULAR DIET AS YOU FEEL ABLE.    YOU MAY HAVE A DRY MOUTH, A SORE THROAT, MUSCLES ACHES OR TROUBLE SLEEPING.  THESE SHOULD GO AWAY AFTER 24 HOURS.    CALL YOUR DOCTOR FOR ANY OF THE FOLLOWING:  SIGNS OF INFECTION (FEVER, GROWING TENDERNESS AT THE SURGERY SITE, A LARGE AMOUNT OF DRAINAGE OR BLEEDING, SEVERE PAIN, FOUL-SMELLING DRAINAGE, REDNESS OR SWELLING.    IT HAS BEEN OVER 8 TO 10 HOURS SINCE SURGERY AND YOU ARE STILL NOT ABLE TO URINATE (PASS WATER).     Maximum acetaminophen (Tylenol) dose from all sources should not exceed 4 grams (4000 mg) per day. YOu were given 975 mg of Tylenol at 10:00 a.m.    You received Toradol, an IV form of ibuprofen (Motrin) at 12:00 p.m..  Do not take any ibuprofen products until 6:00 p.m.    You were given one oxycodone  5 mg tablet at 12:40 p.m.

## 2019-05-28 ENCOUNTER — NURSE TRIAGE (OUTPATIENT)
Dept: NURSING | Facility: CLINIC | Age: 29
End: 2019-05-28

## 2019-05-28 NOTE — TELEPHONE ENCOUNTER
Outbound call attempt x 1 to patient at [phone], no answer, left non-detailed message to call [facility] back.    Encouraged call back to [facility] 24/7 for new/worsening symptoms or further questions.    Cherelle Paul RN  [facility]

## 2019-06-27 ENCOUNTER — TRANSFERRED RECORDS (OUTPATIENT)
Dept: HEALTH INFORMATION MANAGEMENT | Facility: CLINIC | Age: 29
End: 2019-06-27

## 2019-07-22 ENCOUNTER — HOSPITAL ENCOUNTER (EMERGENCY)
Facility: CLINIC | Age: 29
Discharge: HOME | End: 2019-07-22
Attending: EMERGENCY MEDICINE | Admitting: EMERGENCY MEDICINE
Payer: COMMERCIAL

## 2019-07-22 VITALS
HEART RATE: 70 BPM | RESPIRATION RATE: 14 BRPM | DIASTOLIC BLOOD PRESSURE: 103 MMHG | TEMPERATURE: 97.8 F | OXYGEN SATURATION: 99 % | SYSTOLIC BLOOD PRESSURE: 172 MMHG

## 2019-07-22 DIAGNOSIS — K08.89 PAIN, DENTAL: ICD-10-CM

## 2019-07-22 PROCEDURE — 99283 EMERGENCY DEPT VISIT LOW MDM: CPT | Mod: 25

## 2019-07-22 PROCEDURE — 64400 NJX AA&/STRD TRIGEMINAL NRV: CPT

## 2019-07-22 RX ORDER — BUPIVACAINE HYDROCHLORIDE 5 MG/ML
INJECTION, SOLUTION PERINEURAL
Status: DISCONTINUED
Start: 2019-07-22 | End: 2019-07-22 | Stop reason: HOSPADM

## 2019-07-22 RX ORDER — CLINDAMYCIN HCL 300 MG
300 CAPSULE ORAL 4 TIMES DAILY
Qty: 40 CAPSULE | Refills: 0 | Status: SHIPPED | OUTPATIENT
Start: 2019-07-22 | End: 2019-09-10

## 2019-07-22 NOTE — ED PROVIDER NOTES
History     Chief Complaint:  Dental Pain    HPI   Olga Sheehan is a 29 year old female who presents with dental pain. The patient states that she has a root canal that she needs repaired on her bottom right molar that is now infected. She states that she is in a lot of pain.     Allergies:  Lamotrigine  Ceclor  penicillin     Medications:    latuda  Zyprexa    Past Medical History:    generalized anxiety disorder  Depression  Bipolar affective disorder  Morbid obesity  Asthma  Sleep apnea  fatty liver  PTSD    Past Surgical History:    Rectum exam under anesthesia  ENT surgery     Family History:    Drug abuse- father  Bipolar- father  Depression- father  Eating disorder- sister    Social History:  The patient was accompanied to the ED by significant other.  Smoking Status: current everyday smoker   Smokeless Tobacco: Never Used  Alcohol Use: Positive  Drug use: marijuana   Marital Status:  Single        Review of Systems   HENT: Positive for dental problem.    All other systems reviewed and are negative.        Physical Exam     Patient Vitals for the past 24 hrs:   BP Temp Pulse Resp SpO2   07/22/19 0521 (!) 172/103 97.8  F (36.6  C) 70 14 99 %       Physical Exam  Constitutional:  Oriented to person, place, and time. Well appearing  HENT:    Oral: missing left lower 1st molar with decayed exposed tooth rumenent     no surrounding swelling or erythema.   Head:    Normocephalic.   Mouth/Throat:   Oropharynx is clear and moist.   Eyes:    EOM are normal. Pupils are equal, round, and reactive to light.   Neck:    Neck supple.   Cardiovascular:  Normal rate, regular rhythm and normal heart sounds.      Exam reveals no gallop and no friction rub.       No murmur heard.  Pulmonary/Chest:  Effort normal and breath sounds normal.      No respiratory distress. No wheezes. No rales.      No reproducible chest wall pain.  Abdominal:   Soft. No distension. No tenderness. No rebound and no guarding.   Musculoskeletal:   Normal range of motion.   Neurological:   Alert and oriented to person, place, and time.           Moves all 4 extremities spontaneously    Skin:    No rash noted. No pallor. No erythema or edema     Emergency Department Course     Procedures:    Dental Block     INDICATIONS:  Dental Pain    LOCATION:  Left alveloar   ANESTHESIA: Regional block using bupivacine 0.5 %, total of 2 mLs   PROCEDURE NOTE: The patient tolerated the procedure well with good relief of discomfort and there were no complications.    Emergency Department Course:     Nursing notes and vitals reviewed.    0526 I performed an exam of the patient as documented above.     0530 I performed a dental block on the patient and answered all related questions prior to discharge.    Impression & Plan      Medical Decision Making:  The patient presents with a tooth ache.  There is no abscess detected around the tooth amenable to incision and drainage.  The differential diagnosis includes: cracked tooth syndrome, pulpitis, sub-apical abscess, amongst others.  There is no evidence of buccinator/canine space infections, significant facial swelling, or Kevin's angina. There are no posterior pharyngeal space infections detected.  Follow up with a dentist/endodontist in the coming days is indicated for further work up and treatment.    Diagnosis:    ICD-10-CM    1. Pain, dental K08.89      Disposition:   The patient is discharged to home.    Discharge Medications:  START taking      Dose / Directions   clindamycin 300 MG capsule  Commonly known as:  CLEOCIN      Dose:  300 mg  Take 1 capsule (300 mg) by mouth 4 times daily for 10 days  Quantity:  40 capsule  Refills:  0       Scribe Disclosure:  Kendra CHARLES, am serving as a scribe at 5:34 AM on 7/22/2019 to document services personally performed by Lee Lunsford MD based on my observations and the provider's statements to me.    [facility] EMERGENCY DEPARTMENT       Lee Lunsford,  MD  07/22/19 0552

## 2019-07-22 NOTE — ED AVS SNAPSHOT
[facility] Emergency Department  [address]  [facility] [address]  Phone:  [phone]  Fax:  [phone]                                    Olga Sheehan   MRN: [id number]    Department:  [facility] Emergency Department   Date of Visit:  7/22/2019           After Visit Summary Signature Page    I have received my discharge instructions, and my questions have been answered. I have discussed any challenges I see with this plan with the nurse or doctor.    ..........................................................................................................................................  Patient/Patient Representative Signature      ..........................................................................................................................................  Patient Representative Print Name and Relationship to Patient    ..................................................               ................................................  Date                                   Time    ..........................................................................................................................................  Reviewed by Signature/Title    ...................................................              ..............................................  Date                                               Time          22EPIC Rev 08/18

## 2019-07-22 NOTE — DISCHARGE INSTRUCTIONS
Discharge Instructions  Dental Pain    You have been seen today for a toothache. Your pain may be caused by an exposed nerve, an infection (pulpitis), a root abscess, or other problems. You will need to see a dentist for a solution to your tooth problem. Emergency Department care is only to help control your problem until you can see a dentist.  Today, we did not find any sign that your toothache was caused by a serious condition, but sometimes symptoms develop over time and cannot be found during an emergency visit, so it is very important that you follow up with your dentist.      Return to the Emergency Department if:  You develop a fever over 101 degrees Fahrenheit.  You can t open your mouth normally, can t move your tongue well, or can t swallow.  You have new or increased swelling of your face or neck.  You develop drainage of pus or foul smelling material from around your tooth.  What can I do to help myself?  Take any antibiotic the doctor may have prescribed for you today.  Avoid very hot or very cold foods as both can cause pain.  Make an appointment to see a dentist as soon as possible. If you wish, we can provide you with a list of low-cost dental clinics.   If you were given a prescription for medicine here today, be sure to read all of the information (including the package insert) that comes with your prescription.  This will include important information about the medicine, its side effects, and any warnings that you need to know about.  The pharmacist who fills the prescription can provide more information and answer questions you may have about the medicine.  If you have questions or concerns that the pharmacist cannot address, please call or return to the Emergency Department.   Opioid Medication Information    Pain medications are among the most commonly prescribed medicines, so we are including this information for all our patients. If you did not receive pain medication or get a prescription  for pain medicine, you can ignore it.     You may have been given a prescription for an opioid (narcotic) pain medicine and/or have received a pain medicine while here in the Emergency Department. These medicines can make you drowsy or impaired. You must not drive, operate dangerous equipment, or engage in any other dangerous activities while taking these medications. If you drive while taking these medications, you could be arrested for DUI, or driving under the influence. Do not drink any alcohol while you are taking these medications.     Opioid pain medications can cause addiction. If you have a history of chemical dependency of any type, you are at a higher risk of becoming addicted to pain medications.  Only take these prescribed medications to treat your pain when all other options have been tried. Take it for as short a time and as few doses as possible. Store your pain pills in a secure place, as they are frequently stolen and provide a dangerous opportunity for children or visitors in your house to start abusing these powerful medications. We will not replace any lost or stolen medicine.  As soon as your pain is better, you should flush all your remaining medication.     Many prescription pain medications contain Tylenol  (acetaminophen), including Vicodin , Tylenol #3 , Norco , Lortab , and Percocet .  You should not take any extra pills of Tylenol  if you are using these prescription medications or you can get very sick.  Do not ever take more than 3000 mg of acetaminophen in any 24 hour period.    All opioids tend to cause constipation. Drink plenty of water and eat foods that have a lot of fiber, such as fruits, vegetables, prune juice, apple juice and high fiber cereal.  Take a laxative if you don t move your bowels at least every other day. Miralax , Milk of Magnesia, Colace , or Senna  can be used to keep you regular.      Remember that you can always come back to the Emergency Department if you are  not able to see your regular doctor in the amount of time listed above, if you get any new symptoms, or if there is anything that worries you.

## 2019-09-10 ENCOUNTER — OFFICE VISIT (OUTPATIENT)
Dept: SLEEP MEDICINE | Facility: CLINIC | Age: 29
End: 2019-09-10
Payer: COMMERCIAL

## 2019-09-10 VITALS
HEART RATE: 74 BPM | RESPIRATION RATE: 16 BRPM | BODY MASS INDEX: 45.99 KG/M2 | OXYGEN SATURATION: 97 % | SYSTOLIC BLOOD PRESSURE: 124 MMHG | WEIGHT: 293 LBS | HEIGHT: 67 IN | DIASTOLIC BLOOD PRESSURE: 82 MMHG

## 2019-09-10 DIAGNOSIS — G47.00 INSOMNIA, UNSPECIFIED TYPE: ICD-10-CM

## 2019-09-10 DIAGNOSIS — G47.33 OSA (OBSTRUCTIVE SLEEP APNEA): Primary | ICD-10-CM

## 2019-09-10 PROCEDURE — 99215 OFFICE O/P EST HI 40 MIN: CPT | Performed by: PHYSICIAN ASSISTANT

## 2019-09-10 ASSESSMENT — MIFFLIN-ST. JEOR: SCORE: 2425.94
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
## 2019-09-10 NOTE — NURSING NOTE
"Chief Complaint   Patient presents with     Sleep Problem     Establish care, snoring through cpap machine        Initial /82   Pulse 74   Resp 16   Ht 1.702 m (5' 7\")   Wt (!) 167.8 kg (370 lb)   SpO2 97%   BMI 57.95 kg/m   Estimated body mass index is 57.95 kg/m  as calculated from the following:    Height as of this encounter: 1.702 m (5' 7\").    Weight as of this encounter: 167.8 kg (370 lb).    Medication Reconciliation: complete    Neck circumference: 18 inches /47 centimeters.    Ess 5    Debbie Thompson MA      "

## 2019-09-10 NOTE — PATIENT INSTRUCTIONS
Instructions for treating Delayed Sleep Phase Syndrome:    Aim to be in bed 8 hours nightly. Try to keep a consistent bed time and wake time as much as possible.  Delayed Sleep Phase Syndrome (DSPS) means that your body's internal timing is set late compared to the 24 hour day. Therefore, it is often difficult to get up on time for work in the morning and sometimes difficult to fall asleep on time, in order to get enough sleep. People with DSPS often tend to like to stay up late on weekends and sleep in until between 10 AM and noon, sometimes even later.This is actually a bad habit that will perpetuate the problem. It reinforces your body's tendency to be on that later schedule.    You should go to bed when you are sleepy and ready to sleep. During this entire process, you should not engage in activities that may make it worse, such as watching TV in bed, leaving the TV on all night, drinking any caffeine 6 hours before bed or exercising 1-2 hours before bed.     Start taking Melatonin, 0.5-1 mg tablet 3-5 hours before the time that you fall asleep on average (not your desired bedtime or time that you get in bed, but the time you normally fall asleep on your own).     Upon awakening, get exposure to sun-light for about 30-45 minutes. You do not need to look at the sun, in fact, this is dangerous. Reading the paper with the sun shining on you is adequate.  Alternatively, you may use a Seasonal Affective Disorder Lamp (intensity 10,000 Lux) instead of the sun. The lamp should be positioned 1-2 arms lengths away from you. They lamps are sold at Home [employer] Companies such as [employer] or [employer]. A prescription can be written to get insurance coverage in some cases. They are also sold on [employer].    Using the light and melatonin should help march your internal clock (known as your circadian rhythms) gradually earlier. As your bedtime advances, remember to take your melatonin earlier, keeping it 5  hours before your fall asleep time.    Avoid naps and sleeping in because sleeping during the day will delay your body's clock and you will have to start from scratch.     More information about light therapy:  If the cost of any light box is too much, you can also purchase a compact fluorescent all spectrum light bulb at a local hardware store for about $8.  The most commonly available bulb is 1400 lumen.  You would need two of these positioned within 1 meter of yourself to be equivalent to 2,500 lux.  The bulbs can be placed in a standard light fixture.  Additionally, they can be placed in a mountable fixture that is used in greenhouses.  Mountable fixtures are also available at hardware stores for about $9.  Do not look directly at the light.  If you have any concerns regarding the safety of bright light therapy for you, it is recommended that you consult an ophthalmologist before using a light box.  If you have a condition that makes your eyes very sensitive to light, macular degeneration, a family history of such problems, or diabetic changes to your eyes, consult an ophthalmologist before using a light box. If you have anxiety disorder and have an increase in anxiety discontinue use.   Your BMI is Body mass index is 57.95 kg/m .  Weight management is a personal decision.  If you are interested in exploring weight loss strategies, the following discussion covers the approaches that may be successful. Body mass index (BMI) is one way to tell whether you are at a healthy weight, overweight, or obese. It measures your weight in relation to your height.  A BMI of 18.5 to 24.9 is in the healthy range. A person with a BMI of 25 to 29.9 is considered overweight, and someone with a BMI of 30 or greater is considered obese. More than two-thirds of American adults are considered overweight or obese.  Being overweight or obese increases the risk for further weight gain. Excess weight may lead to heart disease and  diabetes.  Creating and following plans for healthy eating and physical activity may help you improve your health.  Weight control is part of healthy lifestyle and includes exercise, emotional health, and healthy eating habits. Careful eating habits lifelong are the mainstay of weight control. Though there are significant health benefits from weight loss, long-term weight loss with diet alone may be very difficult to achieve- studies show long-term success with dietary management in less than 10% of people. Attaining a healthy weight may be especially difficult to achieve in those with severe obesity. In some cases, medications, devices and surgical management might be considered.  What can you do?  If you are overweight or obese and are interested in methods for weight loss, you should discuss this with your provider.     Consider reducing daily calorie intake by 500 calories.     Keep a food journal.     Avoiding skipping meals, consider cutting portions instead.    Diet combined with exercise helps maintain muscle while optimizing fat loss. Strength training is particularly important for building and maintaining muscle mass. Exercise helps reduce stress, increase energy, and improves fitness. Increasing exercise without diet control, however, may not burn enough calories to loose weight.       Start walking three days a week 10-20 minutes at a time    Work towards walking thirty minutes five days a week     Eventually, increase the speed of your walking for 1-2 minutes at time    In addition, we recommend that you review healthy lifestyles and methods for weight loss available through the National Institutes of Health patient information sites:  http://win.niddk.nih.gov/publications/index.htm    And look into health and wellness programs that may be available through your health insurance provider, employer, local community center, or cole club.    Weight management plan: Patient was referred to their PCP to  discuss a diet and exercise plan.

## 2019-09-17 ENCOUNTER — TELEPHONE (OUTPATIENT)
Dept: SLEEP MEDICINE | Facility: CLINIC | Age: 29
End: 2019-09-17

## 2019-09-17 NOTE — TELEPHONE ENCOUNTER
Patient came into the [facility] showroom to initiate the transfer of her Cpap care to [facility] from [facility]. All of the proper documentation has been received and the patient is scheduled for a mask fitting at the [facility] Showroom on 9/20/2019 at 9 am with Nancy HUITRON to be set up with a new mask and receive supplies.

## 2019-09-24 ENCOUNTER — OFFICE VISIT (OUTPATIENT)
Dept: INTERNAL MEDICINE | Facility: CLINIC | Age: 29
End: 2019-09-24
Payer: COMMERCIAL

## 2019-09-24 VITALS
WEIGHT: 293 LBS | TEMPERATURE: 98.5 F | DIASTOLIC BLOOD PRESSURE: 72 MMHG | HEART RATE: 85 BPM | OXYGEN SATURATION: 100 % | RESPIRATION RATE: 10 BRPM | HEIGHT: 67 IN | SYSTOLIC BLOOD PRESSURE: 118 MMHG | BODY MASS INDEX: 45.99 KG/M2

## 2019-09-24 DIAGNOSIS — Z23 NEED FOR VACCINATION: ICD-10-CM

## 2019-09-24 DIAGNOSIS — Z00.01 ENCOUNTER FOR GENERAL ADULT MEDICAL EXAMINATION WITH ABNORMAL FINDINGS: Primary | ICD-10-CM

## 2019-09-24 DIAGNOSIS — J45.20 MILD INTERMITTENT ASTHMA WITHOUT COMPLICATION: ICD-10-CM

## 2019-09-24 DIAGNOSIS — Z51.81 ENCOUNTER FOR THERAPEUTIC DRUG MONITORING: ICD-10-CM

## 2019-09-24 DIAGNOSIS — G47.33 OSA (OBSTRUCTIVE SLEEP APNEA): ICD-10-CM

## 2019-09-24 DIAGNOSIS — Z11.4 ENCOUNTER FOR SCREENING FOR HIV: ICD-10-CM

## 2019-09-24 DIAGNOSIS — E66.01 MORBID OBESITY (H): ICD-10-CM

## 2019-09-24 DIAGNOSIS — Z23 NEED FOR PROPHYLACTIC VACCINATION AND INOCULATION AGAINST INFLUENZA: ICD-10-CM

## 2019-09-24 DIAGNOSIS — F31.11 BIPOLAR AFFECTIVE DISORDER, CURRENTLY MANIC, MILD (H): ICD-10-CM

## 2019-09-24 LAB
BASOPHILS # BLD AUTO: 0 10E9/L (ref 0–0.2)
BASOPHILS NFR BLD AUTO: 0.5 %
CARBAMAZEPINE SERPL-MCNC: 4.9 MG/L (ref 4–12)
DIFFERENTIAL METHOD BLD: ABNORMAL
EOSINOPHIL # BLD AUTO: 0.1 10E9/L (ref 0–0.7)
EOSINOPHIL NFR BLD AUTO: 0.9 %
ERYTHROCYTE [DISTWIDTH] IN BLOOD BY AUTOMATED COUNT: 14.1 % (ref 10–15)
HCT VFR BLD AUTO: 39.6 % (ref 35–47)
HGB BLD-MCNC: 12.4 G/DL (ref 11.7–15.7)
LYMPHOCYTES # BLD AUTO: 1.6 10E9/L (ref 0.8–5.3)
LYMPHOCYTES NFR BLD AUTO: 24.8 %
MCH RBC QN AUTO: 28.5 PG (ref 26.5–33)
MCHC RBC AUTO-ENTMCNC: 31.3 G/DL (ref 31.5–36.5)
MCV RBC AUTO: 91 FL (ref 78–100)
MONOCYTES # BLD AUTO: 0.5 10E9/L (ref 0–1.3)
MONOCYTES NFR BLD AUTO: 7.3 %
NEUTROPHILS # BLD AUTO: 4.3 10E9/L (ref 1.6–8.3)
NEUTROPHILS NFR BLD AUTO: 66.5 %
PLATELET # BLD AUTO: 280 10E9/L (ref 150–450)
RBC # BLD AUTO: 4.35 10E12/L (ref 3.8–5.2)
WBC # BLD AUTO: 6.5 10E9/L (ref 4–11)

## 2019-09-24 PROCEDURE — 80053 COMPREHEN METABOLIC PANEL: CPT | Performed by: NURSE PRACTITIONER

## 2019-09-24 PROCEDURE — 90732 PPSV23 VACC 2 YRS+ SUBQ/IM: CPT | Performed by: NURSE PRACTITIONER

## 2019-09-24 PROCEDURE — 82306 VITAMIN D 25 HYDROXY: CPT | Performed by: NURSE PRACTITIONER

## 2019-09-24 PROCEDURE — 90472 IMMUNIZATION ADMIN EACH ADD: CPT | Performed by: NURSE PRACTITIONER

## 2019-09-24 PROCEDURE — 99395 PREV VISIT EST AGE 18-39: CPT | Mod: 25 | Performed by: NURSE PRACTITIONER

## 2019-09-24 PROCEDURE — 87389 HIV-1 AG W/HIV-1&-2 AB AG IA: CPT | Performed by: NURSE PRACTITIONER

## 2019-09-24 PROCEDURE — 90471 IMMUNIZATION ADMIN: CPT | Performed by: NURSE PRACTITIONER

## 2019-09-24 PROCEDURE — 85025 COMPLETE CBC W/AUTO DIFF WBC: CPT | Performed by: NURSE PRACTITIONER

## 2019-09-24 PROCEDURE — 80156 ASSAY CARBAMAZEPINE TOTAL: CPT | Performed by: NURSE PRACTITIONER

## 2019-09-24 PROCEDURE — 36415 COLL VENOUS BLD VENIPUNCTURE: CPT | Performed by: NURSE PRACTITIONER

## 2019-09-24 PROCEDURE — 90686 IIV4 VACC NO PRSV 0.5 ML IM: CPT | Performed by: NURSE PRACTITIONER

## 2019-09-24 PROCEDURE — 84443 ASSAY THYROID STIM HORMONE: CPT | Performed by: NURSE PRACTITIONER

## 2019-09-24 PROCEDURE — 80061 LIPID PANEL: CPT | Performed by: NURSE PRACTITIONER

## 2019-09-24 ASSESSMENT — MIFFLIN-ST. JEOR: SCORE: 2407.34

## 2019-09-24 NOTE — LETTER
My Asthma Action Plan    Name: Olga Sheehan   YOB: 1990  Date: 9/24/2019   My doctor: NIALL Bartlett CNP   My clinic: [facility]        My Rescue Medicine:   Albuterol inhaler (Proair/Ventolin/Proventil HFA)  2-4 puffs EVERY 4 HOURS as needed. Use a spacer if recommended by your provider.   My Asthma Severity:   Intermittent / Exercise Induced  Know your asthma triggers:              GREEN ZONE   Good Control    I feel good    No cough or wheeze    Can work, sleep and play without asthma symptoms       Take your asthma control medicine every day.     1. If exercise triggers your asthma, take your rescue medication    15 minutes before exercise or sports, and    During exercise if you have asthma symptoms  2. Spacer to use with inhaler: If you have a spacer, make sure to use it with your inhaler             YELLOW ZONE Getting Worse  I have ANY of these:    I do not feel good    Cough or wheeze    Chest feels tight    Wake up at night   1. Keep taking your Green Zone medications  2. Start taking your rescue medicine:    every 20 minutes for up to 1 hour. Then every 4 hours for 24-48 hours.  3. If you stay in the Yellow Zone for more than 12-24 hours, contact your doctor.  4. If you do not return to the Green Zone in 12-24 hours or you get worse, start taking your oral steroid medicine if prescribed by your provider.           RED ZONE Medical Alert - Get Help  I have ANY of these:    I feel awful    Medicine is not helping    Breathing getting harder    Trouble walking or talking    Nose opens wide to breathe       1. Take your rescue medicine NOW  2. If your provider has prescribed an oral steroid medicine, start taking it NOW  3. Call your doctor NOW  4. If you are still in the Red Zone after 20 minutes and you have not reached your doctor:    Take your rescue medicine again and    Call 911 or go to the emergency room right away    See your regular doctor within 2 weeks  of an Emergency Room or Urgent Care visit for follow-up treatment.          Annual Reminders:  Meet with Asthma Educator,  Flu Shot in the Fall, consider Pneumonia Vaccination for patients with asthma (aged 19 and older).    Pharmacy: [id number] - [place], [place] - [address]                        Asthma Triggers  How To Control Things That Make Your Asthma Worse    Triggers are things that make your asthma worse.  Look at the list below to help you find your triggers and   what you can do about them. You can help prevent asthma flare-ups by staying away from your triggers.      Trigger                                                          What you can do   Cigarette Smoke  Tobacco smoke can make asthma worse. Do not allow smoking in your home, car or around you.  Be sure no one smokes at a child s day care or school.  If you smoke, ask your health care provider for ways to help you quit.  Ask family members to quit too.  Ask your health care provider for a referral to Quit Plan to help you quit smoking, or call 1-776-600-PLAN.     Colds, Flu, Bronchitis  These are common triggers of asthma. Wash your hands often.  Don t touch your eyes, nose or mouth.  Get a flu shot every year.     Dust Mites  These are tiny bugs that live in cloth or carpet. They are too small to see. Wash sheets and blankets in hot water every week.   Encase pillows and mattress in dust mite proof covers.  Avoid having carpet if you can. If you have carpet, vacuum weekly.   Use a dust mask and HEPA vacuum.   Pollen and Outdoor Mold  Some people are allergic to trees, grass, or weed pollen, or molds. Try to keep your windows closed.  Limit time out doors when pollen count is high.   Ask you health care provider about taking medicine during allergy season.     Animal Dander  Some people are allergic to skin flakes, urine or saliva from pets with fur or feathers. Keep pets with fur or feathers out of your home.    If  you can t keep the pet outdoors, then keep the pet out of your bedroom.  Keep the bedroom door closed.  Keep pets off cloth furniture and away from stuffed toys.     Mice, Rats, and Cockroaches  Some people are allergic to the waste from these pests.   Cover food and garbage.  Clean up spills and food crumbs.  Store grease in the refrigerator.   Keep food out of the bedroom.   Indoor Mold  This can be a trigger if your home has high moisture. Fix leaking faucets, pipes, or other sources of water.   Clean moldy surfaces.  Dehumidify basement if it is damp and smelly.   Smoke, Strong Odors, and Sprays  These can reduce air quality. Stay away from strong odors and sprays, such as perfume, powder, hair spray, paints, smoke incense, paint, cleaning products, candles and new carpet.   Exercise or Sports  Some people with asthma have this trigger. Be active!  Ask your doctor about taking medicine before sports or exercise to prevent symptoms.    Warm up for 5-10 minutes before and after sports or exercise.     Other Triggers of Asthma  Cold air:  Cover your nose and mouth with a scarf.  Sometimes laughing or crying can be a trigger.  Some medicines and food can trigger asthma.
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
## 2019-09-25 DIAGNOSIS — E55.9 VITAMIN D DEFICIENCY: Primary | ICD-10-CM

## 2019-09-25 LAB
ALBUMIN SERPL-MCNC: 3.5 G/DL (ref 3.4–5)
ALP SERPL-CCNC: 80 U/L (ref 40–150)
ALT SERPL W P-5'-P-CCNC: 25 U/L (ref 0–50)
ANION GAP SERPL CALCULATED.3IONS-SCNC: 6 MMOL/L (ref 3–14)
AST SERPL W P-5'-P-CCNC: 8 U/L (ref 0–45)
BILIRUB SERPL-MCNC: 0.2 MG/DL (ref 0.2–1.3)
BUN SERPL-MCNC: 9 MG/DL (ref 7–30)
CALCIUM SERPL-MCNC: 8.4 MG/DL (ref 8.5–10.1)
CHLORIDE SERPL-SCNC: 108 MMOL/L (ref 94–109)
CHOLEST SERPL-MCNC: 180 MG/DL
CO2 SERPL-SCNC: 25 MMOL/L (ref 20–32)
CREAT SERPL-MCNC: 0.78 MG/DL (ref 0.52–1.04)
DEPRECATED CALCIDIOL+CALCIFEROL SERPL-MC: 13 UG/L (ref 20–75)
GFR SERPL CREATININE-BSD FRML MDRD: >90 ML/MIN/{1.73_M2}
GLUCOSE SERPL-MCNC: 93 MG/DL (ref 70–99)
HDLC SERPL-MCNC: 33 MG/DL
HIV 1+2 AB+HIV1 P24 AG SERPL QL IA: NONREACTIVE
LDLC SERPL CALC-MCNC: 118 MG/DL
NONHDLC SERPL-MCNC: 147 MG/DL
POTASSIUM SERPL-SCNC: 3.9 MMOL/L (ref 3.4–5.3)
PROT SERPL-MCNC: 7.2 G/DL (ref 6.8–8.8)
SODIUM SERPL-SCNC: 139 MMOL/L (ref 133–144)
TRIGL SERPL-MCNC: 143 MG/DL
TSH SERPL DL<=0.005 MIU/L-ACNC: 2.36 MU/L (ref 0.4–4)

## 2019-09-25 RX ORDER — CHOLECALCIFEROL (VITAMIN D3) 50 MCG
1 TABLET ORAL DAILY
Qty: 100 TABLET | Refills: 3 | Status: SHIPPED | OUTPATIENT
Start: 2019-09-25 | End: 2021-01-08

## 2019-09-25 ASSESSMENT — ASTHMA QUESTIONNAIRES: ACT_TOTALSCORE: 25

## 2019-10-03 ENCOUNTER — MYC MEDICAL ADVICE (OUTPATIENT)
Dept: INTERNAL MEDICINE | Facility: CLINIC | Age: 29
End: 2019-10-03

## 2019-10-07 ENCOUNTER — HOSPITAL ENCOUNTER (EMERGENCY)
Facility: CLINIC | Age: 29
Discharge: HOME | End: 2019-10-07
Attending: EMERGENCY MEDICINE | Admitting: EMERGENCY MEDICINE
Payer: COMMERCIAL

## 2019-10-07 ENCOUNTER — OFFICE VISIT (OUTPATIENT)
Dept: URGENT CARE | Facility: URGENT CARE | Age: 29
End: 2019-10-07
Payer: COMMERCIAL

## 2019-10-07 ENCOUNTER — APPOINTMENT (OUTPATIENT)
Dept: CT IMAGING | Facility: CLINIC | Age: 29
End: 2019-10-07
Attending: EMERGENCY MEDICINE
Payer: COMMERCIAL

## 2019-10-07 ENCOUNTER — APPOINTMENT (OUTPATIENT)
Dept: ULTRASOUND IMAGING | Facility: CLINIC | Age: 29
End: 2019-10-07
Attending: EMERGENCY MEDICINE
Payer: COMMERCIAL

## 2019-10-07 VITALS
DIASTOLIC BLOOD PRESSURE: 63 MMHG | HEIGHT: 67 IN | HEART RATE: 74 BPM | TEMPERATURE: 98.6 F | RESPIRATION RATE: 18 BRPM | SYSTOLIC BLOOD PRESSURE: 129 MMHG | WEIGHT: 293 LBS | BODY MASS INDEX: 45.99 KG/M2 | OXYGEN SATURATION: 97 %

## 2019-10-07 VITALS
OXYGEN SATURATION: 100 % | HEART RATE: 85 BPM | TEMPERATURE: 99.4 F | SYSTOLIC BLOOD PRESSURE: 124 MMHG | BODY MASS INDEX: 45.99 KG/M2 | WEIGHT: 293 LBS | RESPIRATION RATE: 14 BRPM | DIASTOLIC BLOOD PRESSURE: 82 MMHG | HEIGHT: 67 IN

## 2019-10-07 DIAGNOSIS — K80.50 BILIARY COLIC: ICD-10-CM

## 2019-10-07 DIAGNOSIS — R10.11 ABDOMINAL PAIN, RIGHT UPPER QUADRANT: ICD-10-CM

## 2019-10-07 DIAGNOSIS — R10.31 RLQ ABDOMINAL PAIN: Primary | ICD-10-CM

## 2019-10-07 LAB
ALBUMIN SERPL-MCNC: 3.8 G/DL (ref 3.4–5)
ALBUMIN UR-MCNC: NEGATIVE MG/DL
ALP SERPL-CCNC: 78 U/L (ref 40–150)
ALT SERPL W P-5'-P-CCNC: 28 U/L (ref 0–50)
ANION GAP SERPL CALCULATED.3IONS-SCNC: 3 MMOL/L (ref 3–14)
APPEARANCE UR: CLEAR
AST SERPL W P-5'-P-CCNC: 18 U/L (ref 0–45)
BACTERIA #/AREA URNS HPF: ABNORMAL /HPF
BASOPHILS # BLD AUTO: 0 10E9/L (ref 0–0.2)
BASOPHILS NFR BLD AUTO: 0.3 %
BILIRUB DIRECT SERPL-MCNC: <0.1 MG/DL (ref 0–0.2)
BILIRUB SERPL-MCNC: 0.2 MG/DL (ref 0.2–1.3)
BILIRUB UR QL STRIP: NEGATIVE
BUN SERPL-MCNC: 7 MG/DL (ref 7–30)
CALCIUM SERPL-MCNC: 8.8 MG/DL (ref 8.5–10.1)
CHLORIDE SERPL-SCNC: 108 MMOL/L (ref 94–109)
CO2 SERPL-SCNC: 28 MMOL/L (ref 20–32)
COLOR UR AUTO: ABNORMAL
CREAT SERPL-MCNC: 0.77 MG/DL (ref 0.52–1.04)
DIFFERENTIAL METHOD BLD: NORMAL
EOSINOPHIL # BLD AUTO: 0 10E9/L (ref 0–0.7)
EOSINOPHIL NFR BLD AUTO: 0.5 %
ERYTHROCYTE [DISTWIDTH] IN BLOOD BY AUTOMATED COUNT: 13.3 % (ref 10–15)
GFR SERPL CREATININE-BSD FRML MDRD: >90 ML/MIN/{1.73_M2}
GLUCOSE SERPL-MCNC: 100 MG/DL (ref 70–99)
GLUCOSE UR STRIP-MCNC: NEGATIVE MG/DL
HCG UR QL: NEGATIVE
HCT VFR BLD AUTO: 40.7 % (ref 35–47)
HGB BLD-MCNC: 12.9 G/DL (ref 11.7–15.7)
HGB UR QL STRIP: NEGATIVE
IMM GRANULOCYTES # BLD: 0.1 10E9/L (ref 0–0.4)
IMM GRANULOCYTES NFR BLD: 0.9 %
KETONES UR STRIP-MCNC: NEGATIVE MG/DL
LEUKOCYTE ESTERASE UR QL STRIP: NEGATIVE
LIPASE SERPL-CCNC: 128 U/L (ref 73–393)
LYMPHOCYTES # BLD AUTO: 1.7 10E9/L (ref 0.8–5.3)
LYMPHOCYTES NFR BLD AUTO: 19.5 %
MCH RBC QN AUTO: 28.7 PG (ref 26.5–33)
MCHC RBC AUTO-ENTMCNC: 31.7 G/DL (ref 31.5–36.5)
MCV RBC AUTO: 90 FL (ref 78–100)
MONOCYTES # BLD AUTO: 0.6 10E9/L (ref 0–1.3)
MONOCYTES NFR BLD AUTO: 6.9 %
MUCOUS THREADS #/AREA URNS LPF: PRESENT /LPF
NEUTROPHILS # BLD AUTO: 6.3 10E9/L (ref 1.6–8.3)
NEUTROPHILS NFR BLD AUTO: 71.9 %
NITRATE UR QL: NEGATIVE
NRBC # BLD AUTO: 0 10*3/UL
NRBC BLD AUTO-RTO: 0 /100
PH UR STRIP: 5.5 PH (ref 5–7)
PLATELET # BLD AUTO: 304 10E9/L (ref 150–450)
POTASSIUM SERPL-SCNC: 3.8 MMOL/L (ref 3.4–5.3)
PROT SERPL-MCNC: 7.7 G/DL (ref 6.8–8.8)
RBC # BLD AUTO: 4.5 10E12/L (ref 3.8–5.2)
RBC #/AREA URNS AUTO: 1 /HPF (ref 0–2)
SODIUM SERPL-SCNC: 139 MMOL/L (ref 133–144)
SOURCE: ABNORMAL
SP GR UR STRIP: 1.01 (ref 1–1.03)
SQUAMOUS #/AREA URNS AUTO: 1 /HPF (ref 0–1)
UROBILINOGEN UR STRIP-MCNC: NORMAL MG/DL (ref 0–2)
WBC # BLD AUTO: 8.8 10E9/L (ref 4–11)
WBC #/AREA URNS AUTO: 1 /HPF (ref 0–5)

## 2019-10-07 PROCEDURE — 25000128 H RX IP 250 OP 636: Performed by: EMERGENCY MEDICINE

## 2019-10-07 PROCEDURE — 83690 ASSAY OF LIPASE: CPT | Performed by: EMERGENCY MEDICINE

## 2019-10-07 PROCEDURE — 99214 OFFICE O/P EST MOD 30 MIN: CPT | Performed by: PHYSICIAN ASSISTANT

## 2019-10-07 PROCEDURE — 85025 COMPLETE CBC W/AUTO DIFF WBC: CPT | Performed by: EMERGENCY MEDICINE

## 2019-10-07 PROCEDURE — 80048 BASIC METABOLIC PNL TOTAL CA: CPT | Performed by: EMERGENCY MEDICINE

## 2019-10-07 PROCEDURE — 96374 THER/PROPH/DIAG INJ IV PUSH: CPT | Mod: 59

## 2019-10-07 PROCEDURE — 96375 TX/PRO/DX INJ NEW DRUG ADDON: CPT

## 2019-10-07 PROCEDURE — 25000125 ZZHC RX 250: Performed by: EMERGENCY MEDICINE

## 2019-10-07 PROCEDURE — 99285 EMERGENCY DEPT VISIT HI MDM: CPT | Mod: 25

## 2019-10-07 PROCEDURE — 81025 URINE PREGNANCY TEST: CPT | Performed by: EMERGENCY MEDICINE

## 2019-10-07 PROCEDURE — 80076 HEPATIC FUNCTION PANEL: CPT | Performed by: EMERGENCY MEDICINE

## 2019-10-07 PROCEDURE — 76705 ECHO EXAM OF ABDOMEN: CPT

## 2019-10-07 PROCEDURE — 96376 TX/PRO/DX INJ SAME DRUG ADON: CPT

## 2019-10-07 PROCEDURE — 74177 CT ABD & PELVIS W/CONTRAST: CPT

## 2019-10-07 PROCEDURE — 81001 URINALYSIS AUTO W/SCOPE: CPT | Performed by: EMERGENCY MEDICINE

## 2019-10-07 RX ORDER — IOPAMIDOL 755 MG/ML
500 INJECTION, SOLUTION INTRAVASCULAR ONCE
Status: COMPLETED | OUTPATIENT
Start: 2019-10-07 | End: 2019-10-07

## 2019-10-07 RX ORDER — HYDROMORPHONE HYDROCHLORIDE 1 MG/ML
0.5 INJECTION, SOLUTION INTRAMUSCULAR; INTRAVENOUS; SUBCUTANEOUS
Status: COMPLETED | OUTPATIENT
Start: 2019-10-07 | End: 2019-10-07

## 2019-10-07 RX ORDER — ONDANSETRON 2 MG/ML
4 INJECTION INTRAMUSCULAR; INTRAVENOUS EVERY 30 MIN PRN
Status: DISCONTINUED | OUTPATIENT
Start: 2019-10-07 | End: 2019-10-07 | Stop reason: HOSPADM

## 2019-10-07 RX ADMIN — SODIUM CHLORIDE 65 ML: 9 INJECTION, SOLUTION INTRAVENOUS at 16:46

## 2019-10-07 RX ADMIN — IOPAMIDOL 100 ML: 755 INJECTION, SOLUTION INTRAVENOUS at 16:46

## 2019-10-07 RX ADMIN — HYDROMORPHONE HYDROCHLORIDE 0.5 MG: 1 INJECTION, SOLUTION INTRAMUSCULAR; INTRAVENOUS; SUBCUTANEOUS at 17:28

## 2019-10-07 RX ADMIN — HYDROMORPHONE HYDROCHLORIDE 0.5 MG: 1 INJECTION, SOLUTION INTRAMUSCULAR; INTRAVENOUS; SUBCUTANEOUS at 19:40

## 2019-10-07 RX ADMIN — ONDANSETRON HYDROCHLORIDE 4 MG: 2 INJECTION, SOLUTION INTRAMUSCULAR; INTRAVENOUS at 19:40

## 2019-10-07 RX ADMIN — HYDROMORPHONE HYDROCHLORIDE 0.5 MG: 1 INJECTION, SOLUTION INTRAMUSCULAR; INTRAVENOUS; SUBCUTANEOUS at 16:27

## 2019-10-07 RX ADMIN — ONDANSETRON HYDROCHLORIDE 4 MG: 2 INJECTION, SOLUTION INTRAMUSCULAR; INTRAVENOUS at 15:09

## 2019-10-07 ASSESSMENT — ENCOUNTER SYMPTOMS
SORE THROAT: 0
ABDOMINAL PAIN: 1
DIARRHEA: 1
NAUSEA: 1
DYSURIA: 0
FEVER: 1
HEMATURIA: 0
RHINORRHEA: 0
BLOOD IN STOOL: 0
VOMITING: 1

## 2019-10-07 ASSESSMENT — PAIN SCALES - GENERAL: PAINLEVEL: EXTREME PAIN (9)

## 2019-10-07 ASSESSMENT — MIFFLIN-ST. JEOR
SCORE: 2416.87
SCORE: 2420.95

## 2019-10-07 NOTE — PROGRESS NOTES
"CHIEF COMPLAINT:   Chief Complaint   Patient presents with     Urgent Care     Pelvic Pain     \"ovary\" pain on right side x 1 day. tx: IBU. Pt says there is a hx of ovarian cysts in family.        HPI: Olga Sheehan is a 29 year old female who presents to clinic today for evaluation of lower abdominal pain.  Patient reports that yesterday she had sex with her partner around 6 PM, and shortly later developed lower abdominal pain.  She describes the pain as aching, rated at a 6 out of 10 but it spikes up to an 8 out of 10 pain.  Pain is located in her right groin and does not radiate.  She has had 2 episodes of emesis today. She additionally has lower back pain on the right side.  She endorses having a low-grade fever.  She has taken Tylenol without relief in symptoms.   Last bowel movement was today and was soft, but not diarrhea.   LMP was 2 weeks ago.   Denies  HA, CP, pleuritic chest pain, cough, dysuria, hematuria, diarrhea or weakness.     Past Medical History:   Diagnosis Date     Anxiety and depression      Asthma      Bipolar affective disorder (H)      Depressive disorder     Bipolar disorder     Obese      Sleep apnea     CPAP     Past Surgical History:   Pro[facility] Laterality Date     [facility]     EXAM UNDER ANESTHESIA RECTUM N/A 11/21/2018    Procedure: EXAM UNDER ANESTHESIA;  Surgeon: Chanel Parker MD;  Location: [facility]     INCISION AND DRAINAGE RECTUM, COMBINED N/A 6/28/2018    Procedure: COMBINED INCISION AND DRAINAGE RECTUM;  Incision and drainage of left ischiorectal fossa abscess;  Surgeon: Dave Davis MD;  Location:  OR     PLACEMENT OF SETON RECTUM N/A 11/21/2018    Procedure: PLACEMENT OF SETON RECTUM;  Surgeon: Chanel Parker MD;  Location: [facility]     RECTUM LIFT PROCEDURE RECTAL APPROACH N/A 3/28/2019    Procedure: Ligation of intersphincteric fistula tract;  Surgeon: Chanel Parker MD;  Location:  OR     Social History     Tobacco Use     Smoking " "status: Current Some Day Smoker     Packs/day: 1.00     Years: 8.00     Pack years: 8.00     Types: Other     Smokeless tobacco: Never Used     Tobacco comment: vape less than 1 pot per day, working on quitting   Substance Use Topics     Alcohol use: Yes     Comment: 1-3 drinks/month     Current Outpatient Medications   Medication     ALBUTEROL INHALER 17GM     carBAMazepine (TEGRETOL) 200 MG tablet     MELATONIN PO     vitamin D3 (CHOLECALCIFEROL) 2000 units (50 mcg) tablet     vitamin D3 (CHOLECALCIFEROL) 63410 units capsule     No current facility-administered medications for this visit.      Allergies   Allergen Reactions     Cats      Lamotrigine Other (See Comments)     \"makes me angry\"  Other reaction(s): Headache  Pt also states aggression with this med     Ceclor [Cefaclor] Rash     Penicillins Rash       10 point ROS of systems including Constitutional, Eyes, Respiratory, Cardiovascular, Gastroenterology, Genitourinary, Integumentary, Muscularskeletal, Psychiatric were all negative except for pertinent positives noted in my HPI.        Exam:  /82   Pulse 85   Temp 99.4  F (37.4  C) (Tympanic)   Resp 14   Ht 1.702 m (5' 7\")   Wt (!) 167.3 kg (368 lb 14.4 oz)   LMP 09/23/2019 (Exact Date)   SpO2 100%   BMI 57.78 kg/m    Constitutional: healthy, alert. Patient is flushed.   Head: Normocephalic, atraumatic.  Eyes: conjunctiva clear, no drainage  ENT: MMM  Neck: neck is supple, no cervical lymphadenopathy or nuchal rigidity  Cardiovascular: RRR  Respiratory: CTA bilaterally, no rhonchi or rales  Gastrointestinal: soft. TTP in lower R quadrant. No rigidity. +Psoas  Skin: no rashes  Neurologic: Speech clear, gait normal. Moves all extremities.      ASSESSMENT/PLAN:  1. RLQ abdominal pain  Patient with less than 24 hour history of abdominal pain. She has moderate TTP in RLQ.   Sending to the ER for further evaluation, R/O appendicitis, nephrolithiasis, UTI, pyelonephritis, etc.      Megan Page, " [place]

## 2019-10-07 NOTE — ED AVS SNAPSHOT
[facility] Emergency Department  [address]  [facility] [address]  Phone:  [phone]  Fax:  [phone]                                    Olga Sheehan   MRN: [id number]    Department:  [facility] Emergency Department   Date of Visit:  10/7/2019           After Visit Summary Signature Page    I have received my discharge instructions, and my questions have been answered. I have discussed any challenges I see with this plan with the nurse or doctor.    ..........................................................................................................................................  Patient/Patient Representative Signature      ..........................................................................................................................................  Patient Representative Print Name and Relationship to Patient    ..................................................               ................................................  Date                                   Time    ..........................................................................................................................................  Reviewed by Signature/Title    ...................................................              ..............................................  Date                                               Time          22EPIC Rev 08/18

## 2019-10-07 NOTE — ED PROVIDER NOTES
"  History     Chief Complaint:  Abdominal Pain      HPI   Olga Sheehan is a 29 year old female who presents with right lower quadrant abdominal pain since 1800 last night. It started sharp and is now intermittently more dull. She had continual severe spikes of pain last night. Right now she reports a 6/10 on the pain scale. She also endorses nausea, a few episodes of vomiting, loose stools and fever of 101 this morning. She also notes pain in her right back. Urgent care sent her here with concerns of appendicitis. She did not have any abdominal surgeries but had rectal surgery for an abscess last year. Patient states her menstrual cycle has been regular. No concern for pregnancy. Patient denies hematuria, dysuria, vaginal bleeding or discharge, blood in stool and emesis, rhinorrhea and sore throat.    Allergies:  Cats  Lamotrigine  Ceclor  Penicillin      Medications:    Albuterol inhaler  Tegretol      Past Medical History:    Anxiety   Asthma  Bipolar affective disorder  Depression  Obese  Sleep apnea    Past Surgical History:    [place] teeth   Rectal surgery  I&D    Family History:    MALINA  Mental illness  Depression  Anxiety     Social History:  Presents to the ED with her partner.   Tobacco Use: Former Smoker  Alcohol Use: 1-3 drinks/month  PCP: Physician No Ref-Primary  Marital Status:   [4]  Patient reports that she vapes, but she is trying to decrease the use of this.   Smokes marijuana 2-3 times/month.      Review of Systems   Constitutional: Positive for fever.   HENT: Negative for rhinorrhea and sore throat.    Gastrointestinal: Positive for abdominal pain, diarrhea, nausea and vomiting. Negative for blood in stool.   Genitourinary: Negative for dysuria, hematuria, vaginal bleeding and vaginal discharge.   All other systems reviewed and are negative.      Physical Exam   First Vitals:  BP: (!) 147/76  Pulse: 69  Heart Rate: 86  Temp: 98.6  F (37  C)  Resp: 16  Height: 170.2 cm (5' 7\")  Weight: (!) " 166.9 kg (368 lb)  SpO2: 99 %      Physical Exam  General: Resting on the bed.  Head: No obvious trauma to head.  Ears, Nose, Throat:  External ears normal.  Nose normal.   Eyes:  Conjunctivae clear.    CV: Regular rate and rhythm.  No murmurs.      Respiratory: Effort normal and breath sounds normal.  No wheezing or crackles.   Gastrointestinal: Soft. Obese.  No distension. There is RLQ tenderness.  There is no rigidity, no rebound and no guarding.   Musculoskeletal: No cva tenderness  Skin: Skin is warm and dry.  No rash noted.     Emergency Department Course     Imaging:  Radiographic findings were communicated with the patient who voiced understanding of the findings.    US Abdomen Limited (RUQ)   Final Result   IMPRESSION:   1.  Cholelithiasis. Negative sonographic Rivera sign.   2.  Enlarged fatty liver.      CT Abdomen Pelvis w Contrast   Final Result   Addendum 1 of 1   TISHJEANNETTE NIELSENKAY   Accession # [id number]      The original report on this patient was dictated by me.        The original report was erroneously signed off prematurely. This   addendum will serve as the findings on this examination.      FINDINGS: Fatty liver. Cholelithiasis. Adrenals, spleen, pancreas, and   kidneys do not show any acute abnormalities.      No bowel obstruction. No imaging evidence of appendicitis. No abscess   or free air. No acute pelvic abnormality is seen. No urolithiasis or   hydronephrosis.      IMPRESSION:   1. Fatty liver.   2. Cholelithiasis.   3. No specific acute abnormality otherwise seen      INDIA RICHARDS MD (Date of Addendum: 10/7/2019 5:06 PM)      INDIA RICHARDS MD      Final          Laboratory:  CBC:  WBC 8.8, HGB 12.9, , otherwise WNL  BMP: Glucose 100 (H), otherwise WNL (Creatinine 0.77)  UA: Clear yellow urine, few bacteria, mucous present, otherwise WNL  HCG: Negative    Interventions:  1509: Zofran, 4 mg, IV injection   1627: Dilaudid, 0.5 mg, IV injection   1728: Dilaudid, 0.5 mg, IV injection      Emergency Department Course:  The patient arrived in triage where vitals were measured and recorded.   The patient was then escorted back to the emergency department.   The patient's medical records were reviewed.  Nursing notes and vitals were reviewed.  1440: I performed an exam of the patient as documented above.  The above workup was undertaken.  1850: I rechecked the patient and discussed results.  2017: I rechecked the patient and discussed results.   Findings and plan explained to the Patient. Patient discharged home, status improved, with instructions regarding supportive care, medications, and reasons to return as well as the importance of close follow-up was reviewed.     Impression & Plan      Medical Decision Making:  The patient presents for acute onset upper abdominal pain.  Vital signs are reassuring.  Broad differential was pursued including not limited to pancreatitis, hepatitis, appendicitis, ovarian cyst or torsion, PID, TOA, vaginitis, UTI, pyelonephritis, nephrolithiasis, etc. CBC shows no leukocytosis or anemia.  BMP shows no acute electrolyte metabolic or renal dysfunction.  UA is unremarkable without evidence of acute infectious etiology, pyelonephritis, or nephrolithiasis.  Pregnancy test is negative.  LFTs are reassuring in addition to normal lipase not concerning for obstructive biliary process, hepatitis, pancreatitis.  Initially the pain was lower therefore we obtain a CT scan.  CT scan shows fatty liver, cholelithiasis but otherwise no acute surgical process.  Her pain then moved to be more right upper quadrant therefore an ultrasound was obtained to rule out cholecystitis or cholangitis.  No evidence of cholecystitis without any gallbladder wall thickening, negative Rivera sign, etc.  Overall at this point suspect most likely biliary colic.  She was given a general surgery referral.  Encouraged to follow-up closely with primary doctor.  Pain controlled in the emergency department  she felt conned going home.  No indication for admission at this time.  Discussed possible return precautions and advised close follow-up.  She voiced understanding.  Patient was discharged in stable improved condition.    Diagnosis:    ICD-10-CM    1. Biliary colic K80.50    2. Abdominal pain, right upper quadrant R10.11        Disposition:  Discharged to home.     Discharge Medications:  Discharge Medication List as of 10/7/2019  8:23 PM            Dottie CHARLES, am serving as a scribe on 10/7/2019 at 2:40 PM to personally document services performed by Dr. Garcia based on my observations and the provider's statements to me.   [facility] EMERGENCY DEPARTMENT       Sheyla Garcia MD  10/07/19 2032

## 2019-10-08 ENCOUNTER — TELEPHONE (OUTPATIENT)
Dept: SURGERY | Facility: CLINIC | Age: 29
End: 2019-10-08

## 2019-10-08 ENCOUNTER — OFFICE VISIT (OUTPATIENT)
Dept: SURGERY | Facility: CLINIC | Age: 29
End: 2019-10-08
Payer: COMMERCIAL

## 2019-10-08 ENCOUNTER — PREP FOR PROCEDURE (OUTPATIENT)
Dept: SURGERY | Facility: CLINIC | Age: 29
End: 2019-10-08

## 2019-10-08 VITALS
RESPIRATION RATE: 16 BRPM | HEIGHT: 67 IN | DIASTOLIC BLOOD PRESSURE: 70 MMHG | BODY MASS INDEX: 45.99 KG/M2 | OXYGEN SATURATION: 98 % | SYSTOLIC BLOOD PRESSURE: 118 MMHG | HEART RATE: 81 BPM | WEIGHT: 293 LBS

## 2019-10-08 DIAGNOSIS — K80.20 CALCULUS OF GALLBLADDER WITHOUT CHOLECYSTITIS WITHOUT OBSTRUCTION: Primary | ICD-10-CM

## 2019-10-08 DIAGNOSIS — K80.20 CHOLELITHIASIS: Primary | ICD-10-CM

## 2019-10-08 PROCEDURE — 99204 OFFICE O/P NEW MOD 45 MIN: CPT | Performed by: SURGERY

## 2019-10-08 ASSESSMENT — MIFFLIN-ST. JEOR: SCORE: 2416.87

## 2019-10-08 NOTE — TELEPHONE ENCOUNTER
Type of surgery: LAPAROSCOPIC CHOLECYSTECTOMY   Location of surgery: [facility]  Date and time of surgery: 10/10/2019 @ 2:30 PM   Surgeon: Olga Adam MD   Pre-Op Appt Date: 10/7/2019 ER   Post-Op Appt Date: PATIENT TO SCHEDULE     Packet sent out: Yes PACKET AND SOAP GIVEN TO PATIENT    Pre-cert/Authorization completed:  Not Applicable  Date: 10/8/2019     LAPAROSCOPIC CHOLECYSTECTOMY    GENERAL H&P DONE ER 10/7/2019 60 MIN REQ PA ASSIST MGB NMS   [facility]GB OK'D PA TO ASSIST WHEN FINISHED NMS

## 2019-10-08 NOTE — DISCHARGE INSTRUCTIONS
Return to the ED if you are unable to tolerate fluids, intractable nausea or vomiting, severe abdominal pain, fevers >101 or other acute changes.  Please follow up with your PCP in 2-3 days.      Discharge Instructions  Biliary Colic    You have been seen today for biliary colic. Biliary colic is the pain that happens when gallstones block the normal flow of bile from the gallbladder.  It usually is a steady or crampy pain in the upper abdomen (belly), most often under the right side of the rib cage where the gallbladder is. Sometimes you get pain from the gallbladder in your back or shoulder. It is common to have nausea (sick to stomach) and vomiting (throwing up) with biliary colic.    Bile is a liquid the body makes to help with digesting fat.  It is made by the liver and stored in the gallbladder and released from the gall bladder when you eat fatty foods. Gallstones can form for a variety of reasons. Risk factors for gallstones include being female, having a family history of gallstones, being older, being pregnant or having been pregnant, having diabetes, having rapid weight loss, and others.      Once gallstones form, surgeons usually tell you to have your gallbladder removed. There is medicine that can dissolve gallstones, but it can be unpleasant to take, and gallstones tend to come back when you quit taking the medicine. Your regular provider can help decide on the right treatment for you, and may refer you to a surgeon to discuss whether surgery is right in your case.     Complications of gallstones include infection, jaundice, inflammation of the pancreas, and rupture of the gallbladder. One of these complications will happen to about one out of every four patients with gallstones over the next 10-20 years if they are not treated.       Generally, every Emergency Department visit should have a follow-up clinic visit with either a primary or a specialty clinic/provider. Please follow-up as instructed by  your emergency provider today.    Return to the Emergency Department if you develop:  Fever greater than 100.5 F.   Persistent nausea and vomiting.  Pain that will not go away with the medicines you were given here.  Yellow skin or eye color (jaundice).  Other new concerning symptoms.    What can I do to help myself?  Eat regular meals at least three times a day, to make the gallbladder empty before it gets too full.  Avoid fried or fatty foods.  Drink plenty of clear fluids.  Take over-the-counter or prescribed pain medications as recommended by your provider.      If you were given a prescription for medicine here today, be sure to read all of the information (including the package insert) that comes with your prescription.  This will include important information about the medicine, its side effects, and any warnings that you need to know about.  The pharmacist who fills the prescription can provide more information and answer questions you may have about the medicine.  If you have questions or concerns that the pharmacist cannot address, please call or return to the Emergency Department.     Remember that you can always come back to the Emergency Department if you are not able to see your regular provider in the amount of time listed above, if you get any new symptoms, or if there is anything that worries you.

## 2019-10-08 NOTE — PROGRESS NOTES
[facility]  New Patient Office Visit    Olga Sheehan is a 29 year old female seen in consultation for Abdominal pain, right upper quadrant at the request of NIALL Bartlett CNP.     Assessment and Plan:  It is my impression that Olga has symptomatic gallstones.   I have offered her a laparoscopic cholecystectomy.      We have discussed the indication, alternatives, risks and expected recovery.  Specifically we have discussed incisions, scarring, postoperative infections, anesthesia, bleeding, blood transfusion, open conversion, common bile duct injury, injury to intra-abdominal organs, adhesions that can lead to bowel obstruction, retained common bile duct stone, bile leak, DVT, PE, hernia, post cholecystectomy diarrhea, postoperative dietary restrictions and physical limitations.  We have discussed the recommended interventions and treatments for these complications.  All questions have been answered to the best of my ability.         We will schedule surgery at the patient's convenience.    Chief complaint:  Abdominal pain, right upper quadrant    HPI:  Olag Sheehan is a 29 year old female who presents with intermittent right upper quadrant pain for several months.  The pain is associated with eating fatty foods.  Positive for associated symptoms of nausea, diarrhea, fever and chills.  Negative for associated symptoms of vomiting.  She does not have a history of jaundice or dark urine.  She  has not had pancreatitis in the past.        She was evaluated in the emergency department yesterday, where a CT scan revealed cholelithiasis, confirmed on right upper quadrant ultrasound.  Labs were unremarkable including normal white blood cell count and normal LFTs.  Common bile duct was 5 mm on ultrasound.  She was discharged home with instructions to follow-up in general surgery clinic for discussion of elective cholecystectomy.     Past Medical History:  Anxiety  Asthma   Bipolar affective  "disorder  Depressive disorder  Morbid obesity  Sleep apnea.    Past Surgical History:  Past Surgical History:   Procedure Laterality Date     ENT SURGERY      wisdom     EXAM UNDER ANESTHESIA RECTUM N/A 11/21/2018    Procedure: EXAM UNDER ANESTHESIA;  Surgeon: Chanel Parker MD;  Location: [facility]     INCISION AND DRAINAGE RECTUM, COMBINED N/A 6/28/2018    Procedure: COMBINED INCISION AND DRAINAGE RECTUM;  Incision and drainage of left ischiorectal fossa abscess;  Surgeon: Dave Davis MD;  Location:  OR     PLACEMENT OF SETON RECTUM N/A 11/21/2018    Procedure: PLACEMENT OF SETON RECTUM;  Surgeon: Chanel Parker MD;  Location: [facility]     RECTUM LIFT PROCEDURE RECTAL APPROACH N/A 3/28/2019    Procedure: Ligation of intersphincteric fistula tract;  Surgeon: Chanel Parker MD;  Location:  OR       Social History:  Works in retail at [employer]. In a relationship with a female partner.    Social History     Tobacco Use     Smoking status: Current Some Day Smoker     Packs/day: 1.00     Years: 8.00     Pack years: 8.00     Types: Other     Smokeless tobacco: Never Used     Tobacco comment: vape less than 1 pot per day, working on quitting   Substance Use Topics     Alcohol use: Yes     Comment: 1-3 drinks/month     Drug use: Yes     Types: Marijuana     Comment: occasional, 2-3 times per month      Family History:  Family History   Problem Relation Age of Onset     Sleep Apnea Father      Mental Illness Father         Rapid cycling bipolae     Sleep Apnea Maternal Grandfather      Other Cancer Maternal Grandfather      Depression Sister      Anxiety Disorder Sister      Mental Illness Sister         Eating disorder     No FH bleeding or clotting problems or reactions to anesthesia.    Review of Systems:  The 10 point review of systems is negative other than noted in the HPI and above.    Physical Exam:  Vitals: /70   Pulse 81   Resp 16   Ht 1.702 m (5' 7\")   Wt (!) 166.9 kg (368 lb)   " LMP 09/23/2019 (Exact Date)   SpO2 98%   BMI 57.64 kg/m    BMI= Body mass index is 57.64 kg/m .  General - Well developed, well nourished male in no apparent distress  HEENT:  Pupils equal and round, conjunctivae clear, no scleral icterus, mucous membranes moist, external ears and nose normal  Pulmonary: Breathing is unlabored on room air  CV: Regular pulse  Abdomen: soft, obese, non-distended with no tenderness noted, no masses palpated. No hernia.  Musculoskeletal:  Moves all extremities equally, arm without edema  Neurologic: alert, speech is clear, nonfocal  Psychiatric: Mood and affect appropriate  Skin: Without lesions, rashes or jaundice    Relevant labs:    WBC -   Lab Results   Component Value Date    WBC 8.8 10/07/2019       HgB -   Lab Results   Component Value Date    HGB 12.9 10/07/2019       Plt-   Lab Results   Component Value Date     10/07/2019       Liver Function Studies -   Recent Labs   Lab Test 10/07/19  1434   PROTTOTAL 7.7   ALBUMIN 3.8   BILITOTAL 0.2   ALKPHOS 78   AST 18   ALT 28       Lipase-   Lab Results   Component Value Date    LIPASE 128 10/07/2019     Imaging:  All imaging studies reviewed by me.    Ultrasound shows: positive cholelithiasis, negative gallbladder wall thickening, negative ductal dilatation, negative pericholecystic fluid, negative sonographic Rivera's sign.    Recent Results (from the past 744 hour(s))   CT Abdomen Pelvis w Contrast    Addendum: 10/7/2019    TISH BRITO  Accession # [id number]    The original report on this patient was dictated by me.      The original report was erroneously signed off prematurely. This  addendum will serve as the findings on this examination.    FINDINGS: Fatty liver. Cholelithiasis. Adrenals, spleen, pancreas, and  kidneys do not show any acute abnormalities.    No bowel obstruction. No imaging evidence of appendicitis. No abscess  or free air. No acute pelvic abnormality is seen. No urolithiasis  or  hydronephrosis.    IMPRESSION:  1. Fatty liver.  2. Cholelithiasis.  3. No specific acute abnormality otherwise seen    INDIA RICHARDS MD (Date of Addendum: 10/7/2019 5:06 PM)    INDIA RICHARDS MD      Narrative    CT ABDOMEN PELVIS W CONTRAST   10/7/2019 4:57 PM     HISTORY: Nausea, vomiting; Abd pain, acute, generalized    TECHNIQUE:  CT abdomen and pelvis with 100mL Isovue-370 IV. Radiation  dose for this scan was reduced using automated exposure control,  adjustment of the mA and/or kV according to patient size, or iterative  reconstruction technique.    COMPARISON: None.    FINDINGS:     INDIA RICHARDS MD   US Abdomen Limited (RUQ)    Narrative    EXAM: US ABDOMEN LIMITED  LOCATION: [facility]  DATE/TIME: 10/7/2019 7:05 PM    INDICATION: Cholelithiasis, right upper quadrant pain  COMPARISON: CT 10/07/2019  TECHNIQUE: Limited abdominal ultrasound.    FINDINGS:  Technically challenging examination secondary to patient body habitus.     GALLBLADDER: Distended. There is an immobile 1.2 cm stone in the neck of the gallbladder,    BILE DUCTS: No biliary dilatation. The common duct measures 5 mm.    LIVER: Echogenic and mildly enlarged. No focal mass.    RIGHT KIDNEY: No hydronephrosis.    PANCREAS: Completely obscured    No ascites.      Impression    IMPRESSION:  1.  Cholelithiasis. Negative sonographic Rivera sign.  2.  Enlarged fatty liver.     This note was created using voice recognition software. Undetected word substitutions or other errors may have occurred.     Time spent with the patient with greater that 50% of the time in discussion was 25 minutes.     Olga Adam MD  [facility] [place]    Please route or send letter to:  Primary Care Provider (PCP) and Referring Provider

## 2019-10-10 ENCOUNTER — ANESTHESIA EVENT (OUTPATIENT)
Dept: SURGERY | Facility: CLINIC | Age: 29
End: 2019-10-10
Payer: COMMERCIAL

## 2019-10-10 ENCOUNTER — HOSPITAL ENCOUNTER (OUTPATIENT)
Facility: CLINIC | Age: 29
Discharge: HOME | End: 2019-10-10
Attending: SURGERY | Admitting: SURGERY
Payer: COMMERCIAL

## 2019-10-10 ENCOUNTER — OFFICE VISIT (OUTPATIENT)
Dept: SURGERY | Facility: PHYSICIAN GROUP | Age: 29
End: 2019-10-10
Payer: COMMERCIAL

## 2019-10-10 ENCOUNTER — ANESTHESIA (OUTPATIENT)
Dept: SURGERY | Facility: CLINIC | Age: 29
End: 2019-10-10
Payer: COMMERCIAL

## 2019-10-10 VITALS
HEART RATE: 64 BPM | HEIGHT: 67 IN | WEIGHT: 293 LBS | TEMPERATURE: 97.9 F | BODY MASS INDEX: 45.99 KG/M2 | SYSTOLIC BLOOD PRESSURE: 104 MMHG | DIASTOLIC BLOOD PRESSURE: 58 MMHG | OXYGEN SATURATION: 95 % | RESPIRATION RATE: 18 BRPM

## 2019-10-10 DIAGNOSIS — K80.20 CHOLELITHIASIS: ICD-10-CM

## 2019-10-10 DIAGNOSIS — Z90.49 S/P LAPAROSCOPIC CHOLECYSTECTOMY: Primary | ICD-10-CM

## 2019-10-10 DIAGNOSIS — G89.28 CHRONIC POST-OPERATIVE PAIN: Primary | ICD-10-CM

## 2019-10-10 PROCEDURE — 88304 TISSUE EXAM BY PATHOLOGIST: CPT | Performed by: SURGERY

## 2019-10-10 PROCEDURE — 25000128 H RX IP 250 OP 636: Performed by: ANESTHESIOLOGY

## 2019-10-10 PROCEDURE — 37000009 ZZH ANESTHESIA TECHNICAL FEE, EACH ADDTL 15 MIN: Performed by: SURGERY

## 2019-10-10 PROCEDURE — 25800030 ZZH RX IP 258 OP 636: Performed by: ANESTHESIOLOGY

## 2019-10-10 PROCEDURE — 25000132 ZZH RX MED GY IP 250 OP 250 PS 637: Performed by: SURGERY

## 2019-10-10 PROCEDURE — 71000027 ZZH RECOVERY PHASE 2 EACH 15 MINS: Performed by: SURGERY

## 2019-10-10 PROCEDURE — 47562 LAPAROSCOPIC CHOLECYSTECTOMY: CPT | Mod: AS | Performed by: PHYSICIAN ASSISTANT

## 2019-10-10 PROCEDURE — 37000008 ZZH ANESTHESIA TECHNICAL FEE, 1ST 30 MIN: Performed by: SURGERY

## 2019-10-10 PROCEDURE — 25000125 ZZHC RX 250

## 2019-10-10 PROCEDURE — 25800025 ZZH RX 258: Performed by: SURGERY

## 2019-10-10 PROCEDURE — 36000056 ZZH SURGERY LEVEL 3 1ST 30 MIN: Performed by: SURGERY

## 2019-10-10 PROCEDURE — 25000128 H RX IP 250 OP 636

## 2019-10-10 PROCEDURE — 27210794 ZZH OR GENERAL SUPPLY STERILE: Performed by: SURGERY

## 2019-10-10 PROCEDURE — 88304 TISSUE EXAM BY PATHOLOGIST: CPT | Mod: 26 | Performed by: SURGERY

## 2019-10-10 PROCEDURE — 47562 LAPAROSCOPIC CHOLECYSTECTOMY: CPT | Performed by: SURGERY

## 2019-10-10 PROCEDURE — 71000012 ZZH RECOVERY PHASE 1 LEVEL 1 FIRST HR: Performed by: SURGERY

## 2019-10-10 PROCEDURE — 25000128 H RX IP 250 OP 636: Performed by: NURSE ANESTHETIST, CERTIFIED REGISTERED

## 2019-10-10 PROCEDURE — 40000306 ZZH STATISTIC PRE PROC ASSESS II: Performed by: SURGERY

## 2019-10-10 PROCEDURE — 36000058 ZZH SURGERY LEVEL 3 EA 15 ADDTL MIN: Performed by: SURGERY

## 2019-10-10 PROCEDURE — 25000128 H RX IP 250 OP 636: Performed by: SURGERY

## 2019-10-10 PROCEDURE — 93010 ELECTROCARDIOGRAM REPORT: CPT | Performed by: INTERNAL MEDICINE

## 2019-10-10 PROCEDURE — 99024 POSTOP FOLLOW-UP VISIT: CPT | Performed by: SURGERY

## 2019-10-10 PROCEDURE — 71000013 ZZH RECOVERY PHASE 1 LEVEL 1 EA ADDTL HR: Performed by: SURGERY

## 2019-10-10 RX ORDER — DEXAMETHASONE SODIUM PHOSPHATE 4 MG/ML
INJECTION, SOLUTION INTRA-ARTICULAR; INTRALESIONAL; INTRAMUSCULAR; INTRAVENOUS; SOFT TISSUE PRN
Status: DISCONTINUED | OUTPATIENT
Start: 2019-10-10 | End: 2019-10-10

## 2019-10-10 RX ORDER — NEOSTIGMINE METHYLSULFATE 1 MG/ML
VIAL (ML) INJECTION PRN
Status: DISCONTINUED | OUTPATIENT
Start: 2019-10-10 | End: 2019-10-10

## 2019-10-10 RX ORDER — HYDROXYZINE PAMOATE 25 MG/1
25 CAPSULE ORAL PRN
COMMUNITY
Start: 2019-09-24 | End: 2022-06-02

## 2019-10-10 RX ORDER — LIDOCAINE HYDROCHLORIDE 10 MG/ML
INJECTION, SOLUTION INFILTRATION; PERINEURAL PRN
Status: DISCONTINUED | OUTPATIENT
Start: 2019-10-10 | End: 2019-10-10

## 2019-10-10 RX ORDER — ACETAMINOPHEN 325 MG/1
1000 TABLET ORAL EVERY 6 HOURS PRN
Qty: 100 TABLET | Refills: 0 | Status: SHIPPED | OUTPATIENT
Start: 2019-10-10 | End: 2021-05-28

## 2019-10-10 RX ORDER — SODIUM CHLORIDE, SODIUM LACTATE, POTASSIUM CHLORIDE, CALCIUM CHLORIDE 600; 310; 30; 20 MG/100ML; MG/100ML; MG/100ML; MG/100ML
INJECTION, SOLUTION INTRAVENOUS CONTINUOUS
Status: DISCONTINUED | OUTPATIENT
Start: 2019-10-10 | End: 2019-10-10 | Stop reason: HOSPADM

## 2019-10-10 RX ORDER — KETOROLAC TROMETHAMINE 30 MG/ML
30 INJECTION, SOLUTION INTRAMUSCULAR; INTRAVENOUS EVERY 6 HOURS PRN
Status: DISCONTINUED | OUTPATIENT
Start: 2019-10-10 | End: 2019-10-10 | Stop reason: HOSPADM

## 2019-10-10 RX ORDER — NALOXONE HYDROCHLORIDE 0.4 MG/ML
.1-.4 INJECTION, SOLUTION INTRAMUSCULAR; INTRAVENOUS; SUBCUTANEOUS
Status: DISCONTINUED | OUTPATIENT
Start: 2019-10-10 | End: 2019-10-10 | Stop reason: HOSPADM

## 2019-10-10 RX ORDER — OXYCODONE HYDROCHLORIDE 5 MG/1
5-10 TABLET ORAL EVERY 4 HOURS PRN
Qty: 12 TABLET | Refills: 0 | Status: SHIPPED | OUTPATIENT
Start: 2019-10-10 | End: 2019-10-17

## 2019-10-10 RX ORDER — HYDROMORPHONE HYDROCHLORIDE 1 MG/ML
.3-.5 INJECTION, SOLUTION INTRAMUSCULAR; INTRAVENOUS; SUBCUTANEOUS EVERY 5 MIN PRN
Status: DISCONTINUED | OUTPATIENT
Start: 2019-10-10 | End: 2019-10-10 | Stop reason: HOSPADM

## 2019-10-10 RX ORDER — LABETALOL 20 MG/4 ML (5 MG/ML) INTRAVENOUS SYRINGE
10
Status: DISCONTINUED | OUTPATIENT
Start: 2019-10-10 | End: 2019-10-10 | Stop reason: HOSPADM

## 2019-10-10 RX ORDER — ONDANSETRON 2 MG/ML
INJECTION INTRAMUSCULAR; INTRAVENOUS PRN
Status: DISCONTINUED | OUTPATIENT
Start: 2019-10-10 | End: 2019-10-10

## 2019-10-10 RX ORDER — ACETAMINOPHEN 325 MG/1
325-650 TABLET ORAL EVERY 6 HOURS PRN
COMMUNITY
End: 2020-01-17

## 2019-10-10 RX ORDER — LIDOCAINE 40 MG/G
CREAM TOPICAL
Status: DISCONTINUED | OUTPATIENT
Start: 2019-10-10 | End: 2019-10-10 | Stop reason: HOSPADM

## 2019-10-10 RX ORDER — ACETAMINOPHEN 325 MG/1
650 TABLET ORAL
Status: DISCONTINUED | OUTPATIENT
Start: 2019-10-10 | End: 2019-10-10 | Stop reason: HOSPADM

## 2019-10-10 RX ORDER — AMOXICILLIN 250 MG
1-2 CAPSULE ORAL 2 TIMES DAILY
Qty: 30 TABLET | Refills: 0 | Status: SHIPPED | OUTPATIENT
Start: 2019-10-10 | End: 2019-10-29

## 2019-10-10 RX ORDER — GLYCOPYRROLATE 0.2 MG/ML
INJECTION, SOLUTION INTRAMUSCULAR; INTRAVENOUS PRN
Status: DISCONTINUED | OUTPATIENT
Start: 2019-10-10 | End: 2019-10-10

## 2019-10-10 RX ORDER — PROPOFOL 10 MG/ML
INJECTION, EMULSION INTRAVENOUS PRN
Status: DISCONTINUED | OUTPATIENT
Start: 2019-10-10 | End: 2019-10-10

## 2019-10-10 RX ORDER — OLANZAPINE 2.5 MG/1
2.5 TABLET, FILM COATED ORAL PRN
COMMUNITY
Start: 2019-08-27

## 2019-10-10 RX ORDER — FENTANYL CITRATE 50 UG/ML
INJECTION, SOLUTION INTRAMUSCULAR; INTRAVENOUS PRN
Status: DISCONTINUED | OUTPATIENT
Start: 2019-10-10 | End: 2019-10-10

## 2019-10-10 RX ORDER — OXYCODONE HYDROCHLORIDE 5 MG/1
10 TABLET ORAL
Status: COMPLETED | OUTPATIENT
Start: 2019-10-10 | End: 2019-10-10

## 2019-10-10 RX ORDER — KETOROLAC TROMETHAMINE 30 MG/ML
INJECTION, SOLUTION INTRAMUSCULAR; INTRAVENOUS PRN
Status: DISCONTINUED | OUTPATIENT
Start: 2019-10-10 | End: 2019-10-10

## 2019-10-10 RX ORDER — BUPIVACAINE HYDROCHLORIDE 5 MG/ML
INJECTION, SOLUTION EPIDURAL; INTRACAUDAL PRN
Status: DISCONTINUED | OUTPATIENT
Start: 2019-10-10 | End: 2019-10-10 | Stop reason: HOSPADM

## 2019-10-10 RX ORDER — FENTANYL CITRATE 50 UG/ML
25-50 INJECTION, SOLUTION INTRAMUSCULAR; INTRAVENOUS
Status: DISCONTINUED | OUTPATIENT
Start: 2019-10-10 | End: 2019-10-10 | Stop reason: HOSPADM

## 2019-10-10 RX ORDER — CEFAZOLIN SODIUM IN 0.9 % NACL 3 G/100 ML
3 INTRAVENOUS SOLUTION, PIGGYBACK (ML) INTRAVENOUS
Status: COMPLETED | OUTPATIENT
Start: 2019-10-10 | End: 2019-10-10

## 2019-10-10 RX ORDER — ONDANSETRON 4 MG/1
4 TABLET, ORALLY DISINTEGRATING ORAL EVERY 30 MIN PRN
Status: DISCONTINUED | OUTPATIENT
Start: 2019-10-10 | End: 2019-10-10 | Stop reason: HOSPADM

## 2019-10-10 RX ORDER — CEFAZOLIN SODIUM 1 G/3ML
1 INJECTION, POWDER, FOR SOLUTION INTRAMUSCULAR; INTRAVENOUS SEE ADMIN INSTRUCTIONS
Status: DISCONTINUED | OUTPATIENT
Start: 2019-10-10 | End: 2019-10-10 | Stop reason: HOSPADM

## 2019-10-10 RX ORDER — ONDANSETRON 2 MG/ML
4 INJECTION INTRAMUSCULAR; INTRAVENOUS EVERY 30 MIN PRN
Status: DISCONTINUED | OUTPATIENT
Start: 2019-10-10 | End: 2019-10-10 | Stop reason: HOSPADM

## 2019-10-10 RX ADMIN — GLYCOPYRROLATE 0.2 MG: 0.2 INJECTION, SOLUTION INTRAMUSCULAR; INTRAVENOUS at 15:41

## 2019-10-10 RX ADMIN — FENTANYL CITRATE 50 MCG: 50 INJECTION INTRAMUSCULAR; INTRAVENOUS at 16:56

## 2019-10-10 RX ADMIN — LIDOCAINE HYDROCHLORIDE 50 MG: 10 INJECTION, SOLUTION INFILTRATION; PERINEURAL at 14:06

## 2019-10-10 RX ADMIN — ROCURONIUM BROMIDE 10 MG: 10 INJECTION INTRAVENOUS at 15:04

## 2019-10-10 RX ADMIN — PROPOFOL 100 MG: 10 INJECTION, EMULSION INTRAVENOUS at 15:33

## 2019-10-10 RX ADMIN — FENTANYL CITRATE 50 MCG: 50 INJECTION INTRAMUSCULAR; INTRAVENOUS at 15:58

## 2019-10-10 RX ADMIN — FENTANYL CITRATE 50 MCG: 50 INJECTION INTRAMUSCULAR; INTRAVENOUS at 17:11

## 2019-10-10 RX ADMIN — ROCURONIUM BROMIDE 20 MG: 10 INJECTION INTRAVENOUS at 14:17

## 2019-10-10 RX ADMIN — ONDANSETRON HYDROCHLORIDE 4 MG: 2 INJECTION, SOLUTION INTRAVENOUS at 14:24

## 2019-10-10 RX ADMIN — FENTANYL CITRATE 50 MCG: 50 INJECTION INTRAMUSCULAR; INTRAVENOUS at 16:49

## 2019-10-10 RX ADMIN — DEXAMETHASONE SODIUM PHOSPHATE 4 MG: 4 INJECTION, SOLUTION INTRA-ARTICULAR; INTRALESIONAL; INTRAMUSCULAR; INTRAVENOUS; SOFT TISSUE at 14:24

## 2019-10-10 RX ADMIN — OXYCODONE HYDROCHLORIDE 10 MG: 5 TABLET ORAL at 16:46

## 2019-10-10 RX ADMIN — PROPOFOL 200 MG: 10 INJECTION, EMULSION INTRAVENOUS at 14:06

## 2019-10-10 RX ADMIN — FENTANYL CITRATE 50 MCG: 50 INJECTION INTRAMUSCULAR; INTRAVENOUS at 16:38

## 2019-10-10 RX ADMIN — FENTANYL CITRATE 100 MCG: 50 INJECTION, SOLUTION INTRAMUSCULAR; INTRAVENOUS at 14:06

## 2019-10-10 RX ADMIN — KETOROLAC TROMETHAMINE 30 MG: 30 INJECTION, SOLUTION INTRAMUSCULAR at 14:24

## 2019-10-10 RX ADMIN — SODIUM CHLORIDE, POTASSIUM CHLORIDE, SODIUM LACTATE AND CALCIUM CHLORIDE: 600; 310; 30; 20 INJECTION, SOLUTION INTRAVENOUS at 13:58

## 2019-10-10 RX ADMIN — Medication 140 MG: at 14:06

## 2019-10-10 RX ADMIN — FENTANYL CITRATE 50 MCG: 50 INJECTION INTRAMUSCULAR; INTRAVENOUS at 16:05

## 2019-10-10 RX ADMIN — Medication 3 G: at 14:02

## 2019-10-10 RX ADMIN — GLYCOPYRROLATE 0.2 MG: 0.2 INJECTION, SOLUTION INTRAMUSCULAR; INTRAVENOUS at 14:06

## 2019-10-10 RX ADMIN — FENTANYL CITRATE 50 MCG: 50 INJECTION, SOLUTION INTRAMUSCULAR; INTRAVENOUS at 14:40

## 2019-10-10 RX ADMIN — GLYCOPYRROLATE 0.6 MG: 0.2 INJECTION, SOLUTION INTRAMUSCULAR; INTRAVENOUS at 15:37

## 2019-10-10 RX ADMIN — MIDAZOLAM 2 MG: 1 INJECTION INTRAMUSCULAR; INTRAVENOUS at 13:58

## 2019-10-10 RX ADMIN — KETOROLAC TROMETHAMINE 30 MG: 30 INJECTION, SOLUTION INTRAMUSCULAR at 16:10

## 2019-10-10 RX ADMIN — FENTANYL CITRATE 50 MCG: 50 INJECTION, SOLUTION INTRAMUSCULAR; INTRAVENOUS at 14:23

## 2019-10-10 RX ADMIN — Medication 1 MG: at 15:41

## 2019-10-10 RX ADMIN — HYDROMORPHONE HYDROCHLORIDE 0.5 MG: 10 INJECTION, SOLUTION INTRAMUSCULAR; INTRAVENOUS; SUBCUTANEOUS at 16:23

## 2019-10-10 RX ADMIN — ROCURONIUM BROMIDE 25 MG: 10 INJECTION INTRAVENOUS at 14:34

## 2019-10-10 RX ADMIN — HYDROMORPHONE HYDROCHLORIDE 1 MG: 1 INJECTION, SOLUTION INTRAMUSCULAR; INTRAVENOUS; SUBCUTANEOUS at 14:45

## 2019-10-10 RX ADMIN — FENTANYL CITRATE 50 MCG: 50 INJECTION, SOLUTION INTRAMUSCULAR; INTRAVENOUS at 14:14

## 2019-10-10 RX ADMIN — Medication 4 MG: at 15:37

## 2019-10-10 RX ADMIN — HYDROMORPHONE HYDROCHLORIDE 0.5 MG: 10 INJECTION, SOLUTION INTRAMUSCULAR; INTRAVENOUS; SUBCUTANEOUS at 18:23

## 2019-10-10 RX ADMIN — ROCURONIUM BROMIDE 5 MG: 10 INJECTION INTRAVENOUS at 14:06

## 2019-10-10 ASSESSMENT — ENCOUNTER SYMPTOMS: DYSRHYTHMIAS: 0

## 2019-10-10 ASSESSMENT — MIFFLIN-ST. JEOR: SCORE: 2385.12

## 2019-10-10 NOTE — OP NOTE
General Surgery Operative Note    PREOPERATIVE DIAGNOSIS:  Cholelithiasis    POSTOPERATIVE DIAGNOSIS:  Same    PROCEDURE:  Laparoscopic Cholecystectomy    ANESTHESIA:  General.    PREOPERATIVE MEDICATIONS:  Ancef IV.    SURGEON:  Olga Adam MD    ASSISTANT:  John Palencia PA-C    ESTIMATED BLOOD LOSS:  20 ml    INDICATIONS:  Olga Sheehan is a 29 year old female who has been experiencing episodes of RUQ abdominal pain for the past several years.  Abdominal imaging has revealed cholelithiasis.  She now presents for laparoscopic cholecystectomy after having risks and benefits reviewed in detail.    PROCEDURE: We began with a supraumbilical incision and attempted my usual Nguyen entry technique.  However, her subcutaneous tissues were too deep to allow for adequate visualization of her fascia.  Therefore, an incision was made in the LUQ and a Veress needle was carefully inserted into the left upper quadrant.  The abdomen was insufflated to a pressure of 15 mmHg.  A 5 mm optical trocar was then inserted in the left upper quadrant.  Next, a 12 mm port was placed through the previous supraumbilical incision under laparoscopic guidance.  Two right upper quadrant secondary trocars were placed under laparoscopic guidance.  We proceeded in the usual fashion first finding the gallbladder neck cystic duct junction.  There were several adhesions between the omentum and the gallbladder which were carefully taken down with electrocautery. The cystic duct was then identified and cleared of inflammatory adhesions. The cystic artery was similarly identified. The triangle of Calot was covered with a large amount of inflamed fat, which made dissection tedious. Once a critical window of safety was achieved, the cystic duct was triply clipped and divided.  Cystic artery was also triply clipped and divided. The gallbladder was removed from the gallbladder bed using coag cautery.  There was no spillage of bile.  The gallbladder was  extracted from the supraumbilical site in an EndoCatch bag.  The camera was repositioned and the supraumbilical port site was closed with an 0 vicryl in a figure of eight using a Jonathan-Margot device.  There was some bleeding from one of the Jonathan-Margot needle pokes which stopped once the suture was tightened. Trocars were removed under laparoscopic visualization. Trocar sites were then infiltrated with 0.5% Marcaine plain. The skin was closed using 4-0 subcuticular vicryl. Steri-Strips were placed on the incisions. The patient was transferred to recovery in good condition.        INTRAOPERATIVE FINDINGS: Mildly inflamed gallbladder, large liver.  Modifier 22 should be added to this case due to the complexity and extra time added due to the patient's BMI of 57.     Specimens:   ID Type Source Tests Collected by Time Destination   A : GALLBLADDER AND CONTENTS Tissue Gallbladder and Contents SURGICAL PATHOLOGY EXAM Olga Adam MD 10/10/2019  3:26 PM        Olga Adam MD

## 2019-10-10 NOTE — ANESTHESIA PREPROCEDURE EVALUATION
Anesthesia Pre-Procedure Evaluation    Patient: Olga Sheehan   MRN: [id number] : 1990          Preoperative Diagnosis: Cholelithiasis [K80.20]    Procedure(s):  LAPAROSCOPIC CHOLECYSTECTOMY,    Past Medical History:   Diagnosis Date     Abdominal pain      Anxiety and depression      Asthma      Bipolar affective disorder (H)      Depressive disorder     Bipolar disorder     Obese      Sleep apnea     CPAP     Past Surgical History:   Procedure Laterality Date     ENT SURGERY      wisdom     EXAM UNDER ANESTHESIA RECTUM N/A 2018    Procedure: EXAM UNDER ANESTHESIA;  Surgeon: Chanel Parker MD;  [facility]     INCISION AND DRAINAGE RECTUM, COMBINED N/A 2018    Procedure: COMBINED INCISION AND DRAINAGE RECTUM;  Incision and drainage of left ischiorectal fossa abscess;  Surgeon: Dave Davis MD;  Location: [facility]     PLACEMENT OF SETON RECTUM N/A 2018    Procedure: PLACEMENT OF SETON RECTUM;  Surgeon: Chanel Parker MD;  [facility]:  [facility]     RECTUM LIFT PROCEDURE RECTAL APPROACH N/A 3/28/2019    Procedure: Ligation of intersphincteric fistula tract;  Surgeon: Chanel Parker MD;  Location: [facility]     Anesthesia Evaluation     .             ROS/MED HX    ENT/Pulmonary:     (+)sleep apnea, asthma , . .   (-) Other pulmonary disease   Neurologic:      (-) TIA, Other neuro hx, Delerium and Dementia   Cardiovascular:        (-) hypertension, CAD, CHF, arrhythmias, pulmonary hypertension and dyslipidemia   METS/Exercise Tolerance:     Hematologic:        (-) anemia   Musculoskeletal:        (-) arthritis   GI/Hepatic:     (+) liver disease,      (-) GERD   Renal/Genitourinary:         Endo:     (+) Obesity, .   (-) Type I DM, Type II DM, thyroid disease, chronic steroid usage and other endocrine disorder   Psychiatric:     (+) psychiatric history bipolar      Infectious Disease:  - neg infectious disease ROS       Malignancy:      - no malignancy   Other:    - neg other ROS                       Physical Exam      Airway   Mallampati: II  TM distance: >3 FB  Neck ROM: full    Dental     Cardiovascular   Rhythm and rate: regular and normal  (-) no murmur    Pulmonary    breath sounds clear to auscultation    Other findings: Lab Test        10/07/19     09/24/19     11/07/18      --          02/21/18                       1434          1118          2301           --           2125          WBC          8.8          6.5          12.5*          < >        10.8          HGB          12.9         12.4         13.1           < >        13.9          MCV          90           91           89             < >        88            PLT          304          280          342            < >        308           INR           --           --           --           --          1.00           < > = values in this interval not displayed.                  Lab Test        10/07/19     09/24/19     11/07/18                       1434          1118          2301          NA           139          139          139           POTASSIUM    3.8          3.9          3.7           CHLORIDE     108          108          107           CO2          28           25           25            BUN          7            9            11            CR           0.77         0.78         0.81          ANIONGAP     3            6            7             JEFRY          8.8          8.4*         8.7           GLC          100*         93           97                  Lab Results   Component Value Date    WBC 8.8 10/07/2019    HGB 12.9 10/07/2019    HCT 40.7 10/07/2019     10/07/2019     10/07/2019    POTASSIUM 3.8 10/07/2019    CHLORIDE 108 10/07/2019    CO2 28 10/07/2019    BUN 7 10/07/2019    CR 0.77 10/07/2019     (H) 10/07/2019    JEFRY 8.8 10/07/2019    MAG 2.2 10/22/2018    ALBUMIN 3.8 10/07/2019    PROTTOTAL 7.7 10/07/2019    ALT 28 10/07/2019    AST 18 10/07/2019    ALKPHOS 78 10/07/2019    BILITOTAL 0.2  "10/07/2019    LIPASE 128 10/07/2019    PTT 28 02/21/2018    INR 1.00 02/21/2018    TSH 2.36 09/24/2019    HCG Negative 10/07/2019    HCGS Negative 02/21/2018       Preop Vitals  BP Readings from Last 3 Encounters:   10/10/19 137/76   10/08/19 118/70   10/07/19 129/63    Pulse Readings from Last 3 Encounters:   10/08/19 81   10/07/19 74   10/07/19 85      Resp Readings from Last 3 Encounters:   10/10/19 16   10/08/19 16   10/07/19 18    SpO2 Readings from Last 3 Encounters:   10/10/19 97%   10/08/19 98%   10/07/19 97%      Temp Readings from Last 1 Encounters:   10/10/19 97.9  F (36.6  C) (Temporal)    Ht Readings from Last 1 Encounters:   10/10/19 1.702 m (5' 7\")      Wt Readings from Last 1 Encounters:   10/10/19 (!) 163.7 kg (361 lb)    Estimated body mass index is 56.54 kg/m  as calculated from the following:    Height as of this encounter: 1.702 m (5' 7\").    Weight as of this encounter: 163.7 kg (361 lb).       Anesthesia Plan      History & Physical Review  History and physical reviewed and following examination; no interval change.    ASA Status:  3 .    NPO Status:  > 8 hours    Plan for General, ETT and RSI with Propofol induction. Maintenance will be Balanced.    PONV prophylaxis:  Ondansetron (or other 5HT-3)  Additional equipment: Videolaryngoscope      Postoperative Care  Postoperative pain management:  IV analgesics and Oral pain medications.      Consents  Anesthetic plan, risks, benefits and alternatives discussed with:  Patient..                 Harrison Syed MD                    .  "

## 2019-10-10 NOTE — DISCHARGE INSTRUCTIONS
GENERAL ANESTHESIA OR SEDATION ADULT DISCHARGE INSTRUCTIONS   SPECIAL PRECAUTIONS FOR 24 HOURS AFTER SURGERY    IT IS NOT UNUSUAL TO FEEL LIGHT-HEADED OR FAINT, UP TO 24 HOURS AFTER SURGERY OR WHILE TAKING PAIN MEDICATION.  IF YOU HAVE THESE SYMPTOMS; SIT FOR A FEW MINUTES BEFORE STANDING AND HAVE SOMEONE ASSIST YOU WHEN YOU GET UP TO WALK OR USE THE BATHROOM.    YOU SHOULD REST AND RELAX FOR THE NEXT 24 HOURS AND YOU MUST MAKE ARRANGEMENTS TO HAVE SOMEONE STAY WITH YOU FOR AT LEAST 24 HOURS AFTER YOUR DISCHARGE.  AVOID HAZARDOUS AND STRENUOUS ACTIVITIES.  DO NOT MAKE IMPORTANT DECISIONS FOR 24 HOURS.    DO NOT DRIVE ANY VEHICLE OR OPERATE MECHANICAL EQUIPMENT FOR 24 HOURS FOLLOWING THE END OF YOUR SURGERY.  EVEN THOUGH YOU MAY FEEL NORMAL, YOUR REACTIONS MAY BE AFFECTED BY THE MEDICATION YOU HAVE RECEIVED.    DO NOT DRINK ALCOHOLIC BEVERAGES FOR 24 HOURS FOLLOWING YOUR SURGERY.    DRINK CLEAR LIQUIDS (APPLE JUICE, GINGER ALE, 7-UP, BROTH, ETC.).  PROGRESS TO YOUR REGULAR DIET AS YOU FEEL ABLE.    YOU MAY HAVE A DRY MOUTH, A SORE THROAT, MUSCLES ACHES OR TROUBLE SLEEPING.  THESE SHOULD GO AWAY AFTER 24 HOURS.    CALL YOUR DOCTOR FOR ANY OF THE FOLLOWING:  SIGNS OF INFECTION (FEVER, GROWING TENDERNESS AT THE SURGERY SITE, A LARGE AMOUNT OF DRAINAGE OR BLEEDING, SEVERE PAIN, FOUL-SMELLING DRAINAGE, REDNESS OR SWELLING.    IT HAS BEEN OVER 8 TO 10 HOURS SINCE SURGERY AND YOU ARE STILL NOT ABLE TO URINATE (PASS WATER).     HOME CARE FOLLOWING LAPAROSCOPIC CHOLECYSTECTOMY  Misael Brunner, MELIA Farnsworth, GUIDO Fernandez. SIMON Esparza &  ERICKA Perez      IINCISIONAL CARE:  Replace the bandage over your incisions until all drainage stops, or if more comfortable to have in place.  If present, leave the steri-strips (white paper tapes) in place for 14 days after surgery.  If Dermabond (a type of skin glue) is present, leave in place until it wears/flakes off.   BATHING:  Avoid baths for 1 week after surgery.  Showers are okay.  You  may wash your hair at any time.  Gently pat your incisions dry after bathing.  ACTIVITY:  Light Activity -- you may immediately be up and about as tolerated.  Driving -- you may drive when comfortable and off narcotic pain medications.  Light Work -- resume when comfortable off pain medications.  (If you can drive, you probably can work.)  Strenuous Work/Activity -- limit lifting to 20 pounds for 2 weeks.  Progressively increase with time.  Active Sports (running, biking, etc.) -- cautiously resume after 2 weeks.  DISCOMFORT:  Use pain medications as prescribed by your surgeon.  Take the pain medication with some food, when possible, to minimize side effects.  Intermittent use of ice packs at the incision sites may help during the first 48 hours.  Expect gradual improvement.  You may experience shoulder pain, which is due to the air placed within your abdomen during the procedure.  This is temporary and usually passes within 2 days.  DIET:  Drink plenty of fluids.  While taking pain medications, increase dietary fiber or add a fiber supplementation like Metamucil or Citrucel to help prevent constipation - a possible side effect of pain medications.  If taking Metamucil or Citrucel, take with plenty of fluids as instructed.  It is not uncommon to experience some bowel changes (loose stools or constipation) after surgery.  Your body has to adapt to you no longer having a gall bladder.  To help minimize this side effect, avoid fatty foods for the first week after surgery.  You may then slowly increase the amount of fatty foods in your diet.    NAUSEA:  If nauseated from the anesthetic/pain meds; rest in bed, get up cautiously with assistance, and drink clear liquids (juice, tea, broth).  RETURN APPOINTMENT:  Schedule a follow-up visit 2-3 weeks post-op.  Office Phone:  [phone]    CONTACT US IF THE FOLLOWING DEVELOPS:  1.  A fever that is above 101   2.  If there is a large amount of drainage, bleeding, or  swelling.  3.  Severe pain that is not relieved by your prescription.  4.  Drainage that is thick, cloudy, yellow, green or white.  5.  Any other questions not answered by  Frequently Asked Questions  sheet.     FREQUENTLY ASKED QUESTIONS AFTER SURGERY  Skylar Brunner, MELIA Farnsworth, GUIDO Fernandez, SIMON Esparza  & ERICKA Perez      Q:  How should my incision look?    A:  Normally your incision will appear slightly swollen with light redness directly along the incision itself as it heals.  It may feel like a bump or ridge as the healing/scarring happens, and over time (3-4 months) this bump or ridge feeling should slowly go away.  In general, clear or pink watery drainage can be normal at first as your incision heals, but should decrease over time.    Q:  How do I know if my incision is infected?  A:  Look at your incision for signs of infection, like redness around the incision spreading to surrounding skin, or drainage of cloudy or foul-smelling drainage.  If you feel warm, check your temperature to see if you are running a fever.    **If any of these things occur, please notify the nurse at our office.  We may need you to come into the office for an incision check.      Q:  How do I take care of my incision?  A:  If you have a dressing in place - Starting the day after surgery, replace the dressing 1-2 times a day until there is no further drainage from the incision.  At that time, a dressing is no longer needed.  Try to minimize tape on the skin if irritation is occurring at the tape sites.  If you have significant irritation from tape on the skin, please call the office to discuss other method of dressing your incision.    Small pieces of tape called  steri-strips  may be present directly overlying your incision; these may be removed 10 days after surgery unless otherwise specified by your surgeon.  If these tapes start to loosen at the ends, you may trim them back until they fall off or are removed.    A:  If you had   Dermabond  tissue glue used as a dressing (this causes your incision to look shiny with a clear covering over it) - This type of dressing wears off with time and does not require more dressings over the top unless it is draining around the glue as it wears off.  Do not apply ointments or lotions over the incisions until the glue has completely worn off.    Q:  There is a piece of tape or a sticky  lead  still on my skin.  Can I remove this?  A:  Sometimes the sticky  leads  used for monitoring during surgery or for evaluation in the emergency department are not all removed while you are in the hospital.  These sometimes have a tab or metal dot on them.  You can easily remove these on your own, like taking off a band-aid.  If there is a gel substance under the  lead , simply wipe/clean it off with a washcloth or paper towel.      Q:  What can I do to minimize constipation (very hard stools, or lack of stools)?  A:  Stay well hydrated.  Increase your dietary fiber intake or take a fiber supplement -with plenty of water.  Walk around frequently.  You may consider an over-the-counter stool-softener.  Your Pharmacist can assist you with choosing one that is stocked at your pharmacy.  Constipation is also one of the most common side effects of pain medication.  If you are using pain medication, be pro-active and try to PREVENT problems with constipation by taking the steps above BEFORE constipation becomes a problem.    Q:  What do I do if I need more pain medications?  A:  Call the office to receive refills.  Be aware that certain pain meds cannot be called into a pharmacy and actually require a paper prescription.  A change may be made in your pain med as you progress thru your recovery period or if you have side effects to certain meds.    --Pain meds are NOT refilled after 5pm on weekdays, and NOT AT ALL on the weekends, so please look ahead to prevent problems.    Q:  Why am I having a hard time sleeping now that I  am at home?  A:  Many medications you receive while you are in the hospital can impact your sleep for a number of days after your surgery/hospitalization.  Decreased level of activity and naps during the day may also make sleeping at night difficult.  Try to minimize day-time naps, and get up frequently during the day to walk around your home during your recovery time.  Sleep aides may be of some help, but are not recommended for long-term use.      Q:  I am having some back discomfort.  What should I do?  A:  This may be related to certain positioning that was required for your surgery, extended periods of time in bed, or other changes in your overall activity level.  You may try ice, heat, acetaminophen, or ibuprofen to treat this temporarily.  Note that many pain medications have acetaminophen in them and would state this on the prescription bottle.  Be sure not to exceed the maximum of 4000mg per day of acetaminophen.     **If the pain you are having does not resolve, is severe, or is a flare of back pain you have had on other occasions prior to surgery, please contact your primary physician for further recommendations or for an appointment to be examined at their office.    Q:  Why am I having headaches?  A:  Headaches can be caused by many things:  caffeine withdrawal, use of pain meds, dehydration, high blood pressure, lack of sleep, over-activity/exhaustion, flare-up of usual migraine headaches.  If you feel this is related to muscle tension (a band-like feeling around the head, or a pressure at the low-back of the head) you may try ice or heat to this area.  You may need to drink more fluids (try electrolyte drink like Gatorade), rest, or take your usual migraine medications.   **If your headaches do not resolve, worsen, are accompanied by other symptoms, or if your blood pressure is high, please call your primary physician for recommendation and/or examination.    Q:  I am unable to urinate.  What do I  do?  A:  A small percentage of people can have difficulty urinating initially after surgery.  This includes being able to urinate only a very small amount at a time and feeling discomfort or pressure in the very low abdomen.  This is called  urinary retention , and is actually an urgent situation.  Proceed to your nearest Emergency department for evaluation (not an Urgent Care Center).  Sometimes the bladder does not work correctly after certain medications you receive during surgery, or related to certain procedures.  You may need to have a catheter placed until your bladder recovers.  When planning to go to an Emergency department, it may help to call the ER to let them know you are coming in for this problem after a surgery.  This may help you get in quicker to be evaluated.  **If you have symptoms of a urinary tract infection, please contact your primary physician for the proper evaluation and treatment.    If you have other questions, please call the office Monday thru Friday between 8am and 5pm to discuss with the nurse or physician assistant.  #[phone]    There is a surgeon ON CALL on weekday evenings and over the weekend in case of urgent need only, and may be contacted at the same number.    If you are having an emergency, call 911 or proceed to your nearest emergency department.    You received Toradol, an IV form of ibuprofen (Motrin) at 2:30pm.  Do not take any ibuprofen products until tomorrow.

## 2019-10-10 NOTE — ANESTHESIA POSTPROCEDURE EVALUATION
Patient: Olga Sheehan    Procedure(s):  LAPAROSCOPIC CHOLECYSTECTOMY    Diagnosis:Cholelithiasis [K80.20]  Diagnosis Additional Information: No value filed.    Anesthesia Type:  General, ETT, RSI    Note:  Anesthesia Post Evaluation    Patient location during evaluation: PACU  Patient participation: Able to fully participate in evaluation  Level of consciousness: awake  Pain management: adequate  Airway patency: patent  Cardiovascular status: acceptable  Respiratory status: acceptable  Hydration status: euvolemic  PONV: controlled     Anesthetic complications: None          Last vitals:  Vitals:    10/10/19 1715 10/10/19 1730 10/10/19 1745   BP: 120/61 120/61 131/78   Pulse: 57 52 56   Resp: 15 17 16   Temp:   98.1  F (36.7  C)   SpO2: 97% 98% 97%         Electronically Signed By: Harrison Syed MD  October 10, 2019  6:04 PM

## 2019-10-10 NOTE — ANESTHESIA CARE TRANSFER NOTE
Patient: Olga Sheehan    Procedure(s):  LAPAROSCOPIC CHOLECYSTECTOMY    Diagnosis: Cholelithiasis [K80.20]  Diagnosis Additional Information: No value filed.    Anesthesia Type:   General, ETT, RSI     Note:    Patient transferred to:PACU  Comments: Pt spont resps oral airway suctioned ETT removed to [facility] VSS report to RNHandoff Report: Identifed the Patient, Identified the Reponsible Provider, Reviewed the pertinent medical history, Discussed the surgical course, Reviewed Intra-OP anesthesia mangement and issues during anesthesia, Set expectations for post-procedure period and Allowed opportunity for questions and acknowledgement of understanding      Vitals: (Last set prior to Anesthesia Care Transfer)    CRNA VITALS  10/10/2019 1519 - 10/10/2019 1553      10/10/2019             Pulse:  109    SpO2:  93 %                Electronically Signed By: NIALL Lua CRNA  October 10, 2019  3:53 PM

## 2019-10-11 LAB — INTERPRETATION ECG - MUSE: NORMAL

## 2019-10-14 LAB — COPATH REPORT: NORMAL

## 2019-10-16 ENCOUNTER — TELEPHONE (OUTPATIENT)
Dept: SURGERY | Facility: CLINIC | Age: 29
End: 2019-10-16

## 2019-10-16 NOTE — TELEPHONE ENCOUNTER
S/p jayne bolanos 10/10/19.  Surgeon:  Dr. Adam    Pt reports that she has low-grade nausea frequently since surgery, but no emesis.  Nausea is intermittent and does not seem to coincide with eating.   She also notes an area of swelling between umbilical incision and RLQ incision that is approximately the size of a hand.  Area of swelling is firm in places and soft in others.  Area of swelling, along with incisions are somewhat painful with movement.  No bruising.  States all incisions are without redness, swelling and drainage. Denies chills, has not taken temp.  She is out of pain medication and is taking Tylenol which offers relief during the day, but she is having a hard time sleeping at night.    She is scheduled to return to work on Fri 10/18 and would like to be seen in clinic for post-op check prior to returning to work.      Post-op appointment scheduled with Daisy Gray PA-C tomorrow at 11:30AM.  Pt agrees with plan.

## 2019-10-17 ENCOUNTER — OFFICE VISIT (OUTPATIENT)
Dept: SURGERY | Facility: CLINIC | Age: 29
End: 2019-10-17
Payer: COMMERCIAL

## 2019-10-17 VITALS
WEIGHT: 293 LBS | SYSTOLIC BLOOD PRESSURE: 124 MMHG | OXYGEN SATURATION: 98 % | DIASTOLIC BLOOD PRESSURE: 78 MMHG | HEART RATE: 86 BPM | RESPIRATION RATE: 16 BRPM | BODY MASS INDEX: 45.99 KG/M2 | HEIGHT: 67 IN

## 2019-10-17 DIAGNOSIS — G89.18 POSTOPERATIVE PAIN: ICD-10-CM

## 2019-10-17 DIAGNOSIS — Z09 SURGICAL FOLLOWUP VISIT: Primary | ICD-10-CM

## 2019-10-17 PROCEDURE — 99024 POSTOP FOLLOW-UP VISIT: CPT | Performed by: PHYSICIAN ASSISTANT

## 2019-10-17 RX ORDER — OXYCODONE HYDROCHLORIDE 5 MG/1
5 TABLET ORAL
Qty: 3 TABLET | Refills: 0 | Status: SHIPPED | OUTPATIENT
Start: 2019-10-17 | End: 2019-10-29

## 2019-10-17 ASSESSMENT — MIFFLIN-ST. JEOR: SCORE: 2385.12

## 2019-10-17 NOTE — PROGRESS NOTES
[facility] Note   Subjective:  Olga Sheehan is here for her first postoperative visit.  She underwent Laparoscopic Cholecystectomy by Dr. Adam on October/10.  Pathology revealed: chronic cholecystitis and stones.  She is now 1 week postop.  Triage call to RN yesterday: due to nausea after eating, and pain/swelling between 2 incision sites.  This prompted scheduling her postop appt today.    The nausea is after eating and is described as quite severe, but not leading to emesis.  She had constipation yesterday but this resolved after restarting stool softener.    The area of swelling/pain is to right of midline between the supraumbilical incision and epigastric incision -seemingly within the abdominal wall.  This is very bothersome when trying to sleep and tho her pain med was helping with this, she has run out of oxycodone and acetaminophen isn't very helpful.  She requests refill.  She is using abdominal binder for support which is mildly helpful.    Planned RTW tomorrow.  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        Additional findings on US/CT in ED:  Enlarged fatty liver.  Pt made aware of these findings.  No additional imaging needed at this time.    Objective:  Abd - soft, morbidly obese, non-distended, area of tenderness to right of midline between supraumbilical incision site and epigastric incision site, no mass/hematoma noted, no palpable muscular abnormality  Incisions - steri-strips in place, no erythema/bruising, +normal healing ridges/density, no seroma/hematoma noted, no hernia noted    Assessment:  -S/p Laparoscopic Cholecystectomy  -Morbid obesity, BMI 57  -Enlarged fatty liver  -Nausea after eating  -Abdominal wall pain    Plan:  She will remain on low fat/bland diet and monitor nausea for improvement with normalization of bowel function.  She was offered zofran, but declined.    We discussed remaining off work a bit longer until her abdominal  wall pain is improved.  Note done for RTW on 10/23.  No restrictions needed as she does not do lifting.  Oxycodone 5mg at bedtime as needed for pain, #3, no refill.  She may continue to utilize acetaminophen and abdominal binder as well as use of ice/heat to sites for comfort.    When abdominal wall pain is improved, she may slowly advance her activities.  Remain at a 30 lb weight restriction until 3 weeks after surgery.  After that time, she may increase activity as tolerated.  She should expect progressive resolution of the healing ridge along the incisional sites, normalizing bowel function, and improvement of food intolerances over the following 2-3 months.    Encouraged continued weight loss efforts.      Pt is recommended to contact the office if worsening pain, onset of fever/redness at any inc site, or new drainage from the area.  Pt also recommended to call office at any time if ongoing questions/concerns during recovery, but otherwise may follow-up on a prn basis.  Olga is in agreement with this plan.    Daisy Gray PA-C      Please route or send letter to:  PCP

## 2019-10-17 NOTE — LETTER
October 17, 2019    RE:  Olga Sheehan                                                                                                                                                       [address]  [facility] [address]    To whom it may concern:    Olga Sheehan is under my professional care for recent surgery and recovery.    Please allow absence from work for recovery 10/10 thru 10/22 for recovery.    Please allow return to work on 10/23, without restriction.    Sincerely,        Daisy Gray PA-C

## 2019-10-29 ENCOUNTER — HOSPITAL ENCOUNTER (OUTPATIENT)
Dept: LAB | Facility: CLINIC | Age: 29
Discharge: HOME | End: 2019-10-29
Attending: SURGERY | Admitting: SURGERY
Payer: COMMERCIAL

## 2019-10-29 ENCOUNTER — OFFICE VISIT (OUTPATIENT)
Dept: SURGERY | Facility: CLINIC | Age: 29
End: 2019-10-29
Payer: COMMERCIAL

## 2019-10-29 VITALS
DIASTOLIC BLOOD PRESSURE: 84 MMHG | SYSTOLIC BLOOD PRESSURE: 120 MMHG | BODY MASS INDEX: 45.99 KG/M2 | OXYGEN SATURATION: 99 % | RESPIRATION RATE: 16 BRPM | WEIGHT: 293 LBS | HEART RATE: 84 BPM | HEIGHT: 67 IN

## 2019-10-29 DIAGNOSIS — G89.18 POSTOPERATIVE PAIN: ICD-10-CM

## 2019-10-29 DIAGNOSIS — G89.18 POSTOPERATIVE PAIN: Primary | ICD-10-CM

## 2019-10-29 LAB
ALBUMIN SERPL-MCNC: 3.4 G/DL (ref 3.4–5)
ALP SERPL-CCNC: 87 U/L (ref 40–150)
ALT SERPL W P-5'-P-CCNC: 22 U/L (ref 0–50)
AST SERPL W P-5'-P-CCNC: 11 U/L (ref 0–45)
BILIRUB DIRECT SERPL-MCNC: <0.1 MG/DL (ref 0–0.2)
BILIRUB SERPL-MCNC: 0.2 MG/DL (ref 0.2–1.3)
LIPASE SERPL-CCNC: 124 U/L (ref 73–393)
PROT SERPL-MCNC: 7.4 G/DL (ref 6.8–8.8)

## 2019-10-29 PROCEDURE — 36415 COLL VENOUS BLD VENIPUNCTURE: CPT | Performed by: SURGERY

## 2019-10-29 PROCEDURE — 80076 HEPATIC FUNCTION PANEL: CPT | Performed by: SURGERY

## 2019-10-29 PROCEDURE — 99024 POSTOP FOLLOW-UP VISIT: CPT | Performed by: SURGERY

## 2019-10-29 PROCEDURE — 83690 ASSAY OF LIPASE: CPT | Performed by: SURGERY

## 2019-10-29 ASSESSMENT — MIFFLIN-ST. JEOR: SCORE: 2385.12

## 2019-10-29 NOTE — PROGRESS NOTES
Subjective:  Olga is here for her second postoperative visit. She underwent laparoscopic cholecystectomy for symptomatic gallstones on 10/10/19.  Her incisional pain is gone, however she has noticed a kind of a burning pain radiating laterally to the left of her umbilical incision.  The pain tends to worsen throughout the day and is exacerbated by movement.  The pain has actually been getting worse over the past couple weeks.  Over-the-counter pain medications do not seem to help.  She has not noticed any bulging at her incisions.  She still following a low-fat diet because she has noticed that she does get looser stools when she eats fatty foods.     Objective:  Abd - obese, soft, 4 laparoscopic incisions healing well, no erythema, +normal healing ridge, no masses. The patient reports pain with palpation of her left abdomen, lateral to her umbilical incision. The area of tenderness extends several centimeters over to her left flank. I feel no underlying hernia or mass.      Plan:  Her pain is atypical for 3 weeks post laparoscopic surgery.  I do not feel any underlying hernias and her pain is not in the area of her incisions. This could be post-operative neuropathic pain and I have referred her to a pain specialist for evaluation.  Additionally, I have ordered a hepatic panel and lipase; perhaps this could be an atypical presentation of a retained common bile duct stone.     RTC PRJEANNETTE Adam MD    Please route or send letter to:  Primary Care Provider (PCP) and Referring Provider

## 2019-11-03 ENCOUNTER — HEALTH MAINTENANCE LETTER (OUTPATIENT)
Age: 29
End: 2019-11-03

## 2020-01-14 ASSESSMENT — ENCOUNTER SYMPTOMS
PALPITATIONS: 1
ARTHRALGIAS: 0
PARESTHESIAS: 0
WEAKNESS: 0
HEMATOCHEZIA: 0
DIARRHEA: 1
NERVOUS/ANXIOUS: 1
ABDOMINAL PAIN: 0
SORE THROAT: 0
FREQUENCY: 0
DYSURIA: 0
DIZZINESS: 1
EYE PAIN: 0
FEVER: 0
HEMATURIA: 0
MYALGIAS: 0
HEARTBURN: 1
SHORTNESS OF BREATH: 1
CONSTIPATION: 0
NAUSEA: 0
CHILLS: 0
HEADACHES: 1
COUGH: 0
BREAST MASS: 0

## 2020-01-14 ASSESSMENT — PATIENT HEALTH QUESTIONNAIRE - PHQ9
SUM OF ALL RESPONSES TO PHQ QUESTIONS 1-9: 24
10. IF YOU CHECKED OFF ANY PROBLEMS, HOW DIFFICULT HAVE THESE PROBLEMS MADE IT FOR YOU TO DO YOUR WORK, TAKE CARE OF THINGS AT HOME, OR GET ALONG WITH OTHER PEOPLE: VERY DIFFICULT
SUM OF ALL RESPONSES TO PHQ QUESTIONS 1-9: 24

## 2020-01-15 ASSESSMENT — PATIENT HEALTH QUESTIONNAIRE - PHQ9: SUM OF ALL RESPONSES TO PHQ QUESTIONS 1-9: 24

## 2020-01-17 ENCOUNTER — OFFICE VISIT (OUTPATIENT)
Dept: FAMILY MEDICINE | Facility: CLINIC | Age: 30
End: 2020-01-17
Payer: COMMERCIAL

## 2020-01-17 VITALS
SYSTOLIC BLOOD PRESSURE: 127 MMHG | HEIGHT: 66 IN | DIASTOLIC BLOOD PRESSURE: 82 MMHG | OXYGEN SATURATION: 99 % | BODY MASS INDEX: 47.09 KG/M2 | WEIGHT: 293 LBS | RESPIRATION RATE: 16 BRPM | TEMPERATURE: 98.3 F | HEART RATE: 83 BPM

## 2020-01-17 DIAGNOSIS — Z00.00 ROUTINE GENERAL MEDICAL EXAMINATION AT A HEALTH CARE FACILITY: Primary | ICD-10-CM

## 2020-01-17 DIAGNOSIS — Z13.1 SCREENING FOR DIABETES MELLITUS: ICD-10-CM

## 2020-01-17 DIAGNOSIS — Z13.220 LIPID SCREENING: ICD-10-CM

## 2020-01-17 DIAGNOSIS — F33.41 RECURRENT MAJOR DEPRESSIVE DISORDER, IN PARTIAL REMISSION (H): ICD-10-CM

## 2020-01-17 DIAGNOSIS — R19.7 DIARRHEA, UNSPECIFIED TYPE: ICD-10-CM

## 2020-01-17 DIAGNOSIS — J45.20 MILD INTERMITTENT ASTHMA WITHOUT COMPLICATION: ICD-10-CM

## 2020-01-17 LAB
CHOLEST SERPL-MCNC: 184 MG/DL
GLUCOSE SERPL-MCNC: 110 MG/DL (ref 70–99)
HDLC SERPL-MCNC: 35 MG/DL
LDLC SERPL CALC-MCNC: 117 MG/DL
NONHDLC SERPL-MCNC: 149 MG/DL
TRIGL SERPL-MCNC: 158 MG/DL

## 2020-01-17 PROCEDURE — 99213 OFFICE O/P EST LOW 20 MIN: CPT | Mod: 25 | Performed by: NURSE PRACTITIONER

## 2020-01-17 PROCEDURE — 36415 COLL VENOUS BLD VENIPUNCTURE: CPT | Performed by: NURSE PRACTITIONER

## 2020-01-17 PROCEDURE — 82947 ASSAY GLUCOSE BLOOD QUANT: CPT | Performed by: NURSE PRACTITIONER

## 2020-01-17 PROCEDURE — 80061 LIPID PANEL: CPT | Performed by: NURSE PRACTITIONER

## 2020-01-17 PROCEDURE — 99395 PREV VISIT EST AGE 18-39: CPT | Performed by: NURSE PRACTITIONER

## 2020-01-17 RX ORDER — ALBUTEROL SULFATE 90 UG/1
2 AEROSOL, METERED RESPIRATORY (INHALATION) EVERY 6 HOURS
Qty: 1 INHALER | Refills: 1 | Status: SHIPPED | OUTPATIENT
Start: 2020-01-17 | End: 2022-06-02

## 2020-01-17 ASSESSMENT — ENCOUNTER SYMPTOMS
NERVOUS/ANXIOUS: 1
FREQUENCY: 0
HEMATOCHEZIA: 0
HEMATURIA: 0
COUGH: 0
PALPITATIONS: 1
NAUSEA: 0
CHILLS: 0
EYE PAIN: 0
ABDOMINAL PAIN: 0
PARESTHESIAS: 0
DIZZINESS: 1
HEARTBURN: 1
BREAST MASS: 0
DYSURIA: 0
SORE THROAT: 0
DIARRHEA: 1
MYALGIAS: 0
HEADACHES: 1
WEAKNESS: 0
FEVER: 0
SHORTNESS OF BREATH: 1
CONSTIPATION: 0
ARTHRALGIAS: 0

## 2020-01-17 ASSESSMENT — MIFFLIN-ST. JEOR: SCORE: 2377.28
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
## 2020-01-24 ENCOUNTER — OFFICE VISIT (OUTPATIENT)
Dept: URGENT CARE | Facility: URGENT CARE | Age: 30
End: 2020-01-24
Payer: COMMERCIAL

## 2020-01-24 ENCOUNTER — ANCILLARY PROCEDURE (OUTPATIENT)
Dept: GENERAL RADIOLOGY | Facility: CLINIC | Age: 30
End: 2020-01-24
Attending: FAMILY MEDICINE
Payer: COMMERCIAL

## 2020-01-24 ENCOUNTER — HOSPITAL ENCOUNTER (EMERGENCY)
Facility: CLINIC | Age: 30
Discharge: HOME | End: 2020-01-24
Attending: EMERGENCY MEDICINE | Admitting: EMERGENCY MEDICINE
Payer: COMMERCIAL

## 2020-01-24 ENCOUNTER — APPOINTMENT (OUTPATIENT)
Dept: CT IMAGING | Facility: CLINIC | Age: 30
End: 2020-01-24
Attending: EMERGENCY MEDICINE
Payer: COMMERCIAL

## 2020-01-24 ENCOUNTER — MYC MEDICAL ADVICE (OUTPATIENT)
Dept: INTERNAL MEDICINE | Facility: CLINIC | Age: 30
End: 2020-01-24

## 2020-01-24 VITALS
SYSTOLIC BLOOD PRESSURE: 131 MMHG | HEIGHT: 66 IN | TEMPERATURE: 98.3 F | HEART RATE: 73 BPM | RESPIRATION RATE: 13 BRPM | DIASTOLIC BLOOD PRESSURE: 75 MMHG | OXYGEN SATURATION: 99 % | WEIGHT: 293 LBS | BODY MASS INDEX: 47.09 KG/M2

## 2020-01-24 VITALS
HEART RATE: 75 BPM | OXYGEN SATURATION: 99 % | SYSTOLIC BLOOD PRESSURE: 124 MMHG | TEMPERATURE: 98.5 F | DIASTOLIC BLOOD PRESSURE: 78 MMHG | RESPIRATION RATE: 18 BRPM

## 2020-01-24 DIAGNOSIS — R06.00 DYSPNEA, UNSPECIFIED TYPE: ICD-10-CM

## 2020-01-24 DIAGNOSIS — R06.02 SOB (SHORTNESS OF BREATH): Primary | ICD-10-CM

## 2020-01-24 DIAGNOSIS — R06.02 SOB (SHORTNESS OF BREATH): ICD-10-CM

## 2020-01-24 DIAGNOSIS — R07.1 CHEST PAIN ON BREATHING: ICD-10-CM

## 2020-01-24 LAB
ANION GAP SERPL CALCULATED.3IONS-SCNC: 3 MMOL/L (ref 3–14)
BASOPHILS # BLD AUTO: 0 10E9/L (ref 0–0.2)
BASOPHILS NFR BLD AUTO: 0.6 %
BUN SERPL-MCNC: 9 MG/DL (ref 7–30)
CALCIUM SERPL-MCNC: 8.7 MG/DL (ref 8.5–10.1)
CHLORIDE SERPL-SCNC: 109 MMOL/L (ref 94–109)
CO2 SERPL-SCNC: 27 MMOL/L (ref 20–32)
CREAT SERPL-MCNC: 0.79 MG/DL (ref 0.52–1.04)
DIFFERENTIAL METHOD BLD: ABNORMAL
EOSINOPHIL # BLD AUTO: 0.1 10E9/L (ref 0–0.7)
EOSINOPHIL NFR BLD AUTO: 1.1 %
ERYTHROCYTE [DISTWIDTH] IN BLOOD BY AUTOMATED COUNT: 13.7 % (ref 10–15)
GFR SERPL CREATININE-BSD FRML MDRD: >90 ML/MIN/{1.73_M2}
GLUCOSE SERPL-MCNC: 106 MG/DL (ref 70–99)
HCT VFR BLD AUTO: 41 % (ref 35–47)
HGB BLD-MCNC: 12.7 G/DL (ref 11.7–15.7)
IMM GRANULOCYTES # BLD: 0 10E9/L (ref 0–0.4)
IMM GRANULOCYTES NFR BLD: 0.6 %
LYMPHOCYTES # BLD AUTO: 1.6 10E9/L (ref 0.8–5.3)
LYMPHOCYTES NFR BLD AUTO: 22.6 %
MCH RBC QN AUTO: 28.4 PG (ref 26.5–33)
MCHC RBC AUTO-ENTMCNC: 31 G/DL (ref 31.5–36.5)
MCV RBC AUTO: 92 FL (ref 78–100)
MONOCYTES # BLD AUTO: 0.5 10E9/L (ref 0–1.3)
MONOCYTES NFR BLD AUTO: 7.4 %
NEUTROPHILS # BLD AUTO: 4.7 10E9/L (ref 1.6–8.3)
NEUTROPHILS NFR BLD AUTO: 67.7 %
NRBC # BLD AUTO: 0 10*3/UL
NRBC BLD AUTO-RTO: 0 /100
NT-PROBNP SERPL-MCNC: 43 PG/ML (ref 0–450)
PLATELET # BLD AUTO: 308 10E9/L (ref 150–450)
POTASSIUM SERPL-SCNC: 3.7 MMOL/L (ref 3.4–5.3)
RBC # BLD AUTO: 4.47 10E12/L (ref 3.8–5.2)
SODIUM SERPL-SCNC: 139 MMOL/L (ref 133–144)
TROPONIN I SERPL-MCNC: <0.015 UG/L (ref 0–0.04)
WBC # BLD AUTO: 7 10E9/L (ref 4–11)

## 2020-01-24 PROCEDURE — 25000125 ZZHC RX 250: Performed by: EMERGENCY MEDICINE

## 2020-01-24 PROCEDURE — 93005 ELECTROCARDIOGRAM TRACING: CPT

## 2020-01-24 PROCEDURE — 25000128 H RX IP 250 OP 636: Performed by: EMERGENCY MEDICINE

## 2020-01-24 PROCEDURE — 83880 ASSAY OF NATRIURETIC PEPTIDE: CPT | Performed by: EMERGENCY MEDICINE

## 2020-01-24 PROCEDURE — 71275 CT ANGIOGRAPHY CHEST: CPT

## 2020-01-24 PROCEDURE — 94640 AIRWAY INHALATION TREATMENT: CPT

## 2020-01-24 PROCEDURE — 84484 ASSAY OF TROPONIN QUANT: CPT | Performed by: EMERGENCY MEDICINE

## 2020-01-24 PROCEDURE — 25000132 ZZH RX MED GY IP 250 OP 250 PS 637: Performed by: EMERGENCY MEDICINE

## 2020-01-24 PROCEDURE — 80048 BASIC METABOLIC PNL TOTAL CA: CPT | Performed by: EMERGENCY MEDICINE

## 2020-01-24 PROCEDURE — 85025 COMPLETE CBC W/AUTO DIFF WBC: CPT | Performed by: EMERGENCY MEDICINE

## 2020-01-24 PROCEDURE — 71046 X-RAY EXAM CHEST 2 VIEWS: CPT

## 2020-01-24 PROCEDURE — 99214 OFFICE O/P EST MOD 30 MIN: CPT | Performed by: FAMILY MEDICINE

## 2020-01-24 PROCEDURE — 93000 ELECTROCARDIOGRAM COMPLETE: CPT | Performed by: FAMILY MEDICINE

## 2020-01-24 PROCEDURE — 96374 THER/PROPH/DIAG INJ IV PUSH: CPT | Mod: 59

## 2020-01-24 PROCEDURE — 99285 EMERGENCY DEPT VISIT HI MDM: CPT | Mod: 25

## 2020-01-24 RX ORDER — ACETAMINOPHEN 500 MG
1000 TABLET ORAL ONCE
Status: COMPLETED | OUTPATIENT
Start: 2020-01-24 | End: 2020-01-24

## 2020-01-24 RX ORDER — IOPAMIDOL 755 MG/ML
500 INJECTION, SOLUTION INTRAVASCULAR ONCE
Status: COMPLETED | OUTPATIENT
Start: 2020-01-24 | End: 2020-01-24

## 2020-01-24 RX ORDER — PREDNISONE 20 MG/1
40 TABLET ORAL DAILY
Qty: 8 TABLET | Refills: 0 | Status: SHIPPED | OUTPATIENT
Start: 2020-01-25 | End: 2020-03-12

## 2020-01-24 RX ORDER — IPRATROPIUM BROMIDE AND ALBUTEROL SULFATE 2.5; .5 MG/3ML; MG/3ML
3 SOLUTION RESPIRATORY (INHALATION) ONCE
Status: COMPLETED | OUTPATIENT
Start: 2020-01-24 | End: 2020-01-24

## 2020-01-24 RX ORDER — METHYLPREDNISOLONE SODIUM SUCCINATE 125 MG/2ML
125 INJECTION, POWDER, LYOPHILIZED, FOR SOLUTION INTRAMUSCULAR; INTRAVENOUS ONCE
Status: COMPLETED | OUTPATIENT
Start: 2020-01-24 | End: 2020-01-24

## 2020-01-24 RX ADMIN — ACETAMINOPHEN 1000 MG: 500 TABLET, FILM COATED ORAL at 18:27

## 2020-01-24 RX ADMIN — SODIUM CHLORIDE 90 ML: 9 INJECTION, SOLUTION INTRAVENOUS at 18:08

## 2020-01-24 RX ADMIN — METHYLPREDNISOLONE 125 MG: 125 INJECTION, POWDER, LYOPHILIZED, FOR SOLUTION INTRAMUSCULAR; INTRAVENOUS at 18:39

## 2020-01-24 RX ADMIN — IPRATROPIUM BROMIDE AND ALBUTEROL SULFATE 3 ML: .5; 3 SOLUTION RESPIRATORY (INHALATION) at 18:38

## 2020-01-24 RX ADMIN — IOPAMIDOL 77 ML: 755 INJECTION, SOLUTION INTRAVENOUS at 18:08

## 2020-01-24 ASSESSMENT — ENCOUNTER SYMPTOMS
FEVER: 0
SHORTNESS OF BREATH: 1

## 2020-01-24 ASSESSMENT — MIFFLIN-ST. JEOR: SCORE: 2186.75

## 2020-01-24 NOTE — ED PROVIDER NOTES
History     Chief Complaint:  Chest Pain and Shortness of Breath    HPI   Olga Sheehan is a 30 year old female who presents with chest pain with radiation to her back and shortness of breath over the last 2 days without any provoking factors. She was seen at  today for reports of shortness of breath, where chest x-ray was obtained, negative for effusions of pneumothorax, referred here for possible PE. She states that it is difficult to get a deep breath and her lungs hurt when she takes breaths. She states that she vapes and buys OTC juuls. No reported fever.    Allergies:  Ibuprofen  Lamotrigine  Ceclor  Penicillins      Medications:    Carbamazepine  Albuterol  Hydroxyzine  Zyprexa    Past Medical History:    Anxiety and depression  Asthma   Bipolar affective disorder  Obesity  Sleep apnea    Past Surgical History:    Cholecystectomy  [place] teeth extraction  Placement of seton rectum  Rectum lift procedure rectal approach    Family History:    Sleep apnea  Mental illness  Depression    Social History:  Smoking status: Current some day smoker  Alcohol use: Yes  Drug use: Yes, marijuana  PCP: Amina Banegas  Presents to the ED with a   Marital Status:        Review of Systems   Constitutional: Negative for fever.   Respiratory: Positive for shortness of breath.    Cardiovascular: Positive for chest pain.   All other systems reviewed and are negative.      Physical Exam     Patient Vitals for the past 24 hrs:   BP Temp Temp src Pulse Heart Rate Resp SpO2 Height Weight   01/24/20 1945 -- -- -- -- 74 -- 99 % -- --   01/24/20 1930 131/75 -- -- 73 73 -- 99 % -- --   01/24/20 1915 132/73 -- -- 72 76 13 97 % -- --   01/24/20 1900 138/70 -- -- 74 76 -- 96 % -- --   01/24/20 1845 116/88 -- -- 75 75 -- -- -- --   01/24/20 1830 135/78 -- -- 66 63 18 100 % -- --   01/24/20 1800 138/85 -- -- 64 72 -- 100 % -- --   01/24/20 1745 120/68 -- -- 70 72 22 99 % -- --   01/24/20 1652 (!) 161/96 98.3  F (36.8  " C) Temporal -- 79 24 100 % 1.676 m (5' 6\") 146 kg (321 lb 14 oz)       Physical Exam  Vital signs and nursing notes reviewed.     Constitutional: laying on gurney appears comfortable  HENT: Oropharynx is clear and moist  Eyes: Conjunctivae are normal bilaterally. Pupils equal  Neck: normal range of motion  Cardiovascular: Normal rate, regular rhythm, normal heart sounds.   Pulmonary/Chest: Effort normal and breath sounds normal. No respiratory distress.   Abdominal: Soft. Bowel sounds are normal. No tenderness to palpation. No rebound or guarding.   Musculoskeletal: No joint swelling or edema.   Neurological: Alert and oriented. No focal weakness  Skin: Skin is warm and dry. No rash noted.   Psych: normal affect    Emergency Department Course   ECG (16:57:42):  Rate 76 bpm. AZ interval 134. QRS duration 92. QT/QTc 366/411. P-R-T axes 3 37 4. Normal sinus rhythm. Normal EKG. Agree with computer interpretation. Interpreted at 1658 by Eliceo Valdivia MD.    Imaging:  Radiographic findings were communicated with the patient who voiced understanding of the findings.    CT Chest Pulmonary Embolism with Contrast  1.  No evidence for pulmonary emboli.  2.  No evidence for acute pulmonary disease.  3.  Fatty liver.  As read by Radiology.    Laboratory:  CBC: WNL (WBC 7, HGB 12.7, )  BMP: Glucose 106 (H) o/w WNL (Creatinine 0.79)  Troponin I (1732): <0.015  BNP: 43    Interventions:  1827: 1,000 mg Tylenol PO  1838: Duoneb, 3 mL, nebulization   1839: 125 mg Solu-Medrol IV    Emergency Department Course:  Past medical records, nursing notes, and vitals reviewed.  1737: I performed an exam of the patient and obtained history, as documented above.    EKG obtained, findings above.     IV inserted and blood drawn.     The patient was sent for a chest CT while in the emergency department, findings above.    Findings and plan explained to the patient. Patient discharged home with instructions regarding supportive care, " medications, and reasons to return. The importance of close follow-up was reviewed. The patient was prescribed Prednisone.     Impression & Plan    Medical Decision Making:  This patient is a 30 year old female who presents with some chest discomfort and some shortness of breath. She was referred here for possible pulmonary embolus from urgent care.  She had a chest x-ray, which was normal at that time. The patient describes a pleuritic type discomfort. She has not really had any obvious URI symptoms recently. She does vape frequently.  She states she uses juul and has not used off the street or any other pre-made vaping solutions. CT scan of the chest was obtained, which thankfully did not show any evidence of pulmonary embolus. There is also no evidence of pneumonia or inflammatory changes in the lungs to suggest vaping related injury. I suspect that she has more of a bronchitis or pneumonitis symptamatology. I recommended Prednisone therapy and to continue using her inhaler at home every 4 hours. The patient understands the plan, reasons to return, and was discharged home in good condition.     Diagnosis:    ICD-10-CM   1. Dyspnea, unspecified type R06.00   2. Chest pain on breathing R07.1       Disposition: Discharged to home.    Discharge Medications:  Discharge Medication List as of 1/24/2020  8:04 PM   START taking these medications    Details   predniSONE (DELTASONE) 20 MG tablet Take 2 tablets (40 mg) by mouth daily for 4 days, Disp-8 tablet, R-0, Local Print     I, Margarita Ramirez, am serving as a scribe at 5:37 PM on 1/24/2020 to document services personally performed by Eliceo Valdivia MD based on my observations and the provider's statements to me.     Margarita Ramirez  1/24/2020   [facility] EMERGENCY DEPARTMENT       Eliceo Valdivia MD  01/25/20 [id number]

## 2020-01-24 NOTE — LETTER
January 27, 2020      Olga Sheehan  [address]  [place] [address]        To Whom It May Concern:    Olga Sheehan was seen at [facility] on 1/24. Please excuse her from work 1/24/20 and 1/25/20.      Sincerely,        NIALL Bartlett CNP

## 2020-01-24 NOTE — PROGRESS NOTES
SUBJECTIVE:  Olga Sheehan is a 30 year old female who presents to the office with the CC of chest pain.  Reports SOB and chest pain started 2 days ago. Has been Vaping everyday for a year. Reports Vaping increasing her chest pain and SOB. Feels that her chest pain and SOB is increasing in severity since it started. Has a history of asthma, but doesn't feel like the albuterol inhaler helps. Tylenol/advil does not help with chest pain. Feels dizzy. Also feels like chest pain radiates into her left arm causing numbness.     Patient has a history of anxiety but doesn't feel like this is prompted because of anxiety.    Past Medical History:   Diagnosis Date     Abdominal pain      Anxiety and depression      Asthma      Bipolar affective disorder (H)      Depressive disorder     Bipolar disorder     Obese      Sleep apnea     CPAP         Current Outpatient Medications:      albuterol (PROAIR HFA/PROVENTIL HFA/VENTOLIN HFA) 108 (90 Base) MCG/ACT inhaler, Inhale 2 puffs into the lungs every 6 hours, Disp: 1 Inhaler, Rfl: 1     ALBUTEROL INHALER 17GM, Inhale into the lungs as needed , Disp: , Rfl:      carBAMazepine (TEGRETOL) 200 MG tablet, Take 400 mg by mouth 2 times daily , Disp: , Rfl:      hydrOXYzine (VISTARIL) 25 MG capsule, Take 25 mg by mouth as needed, Disp: , Rfl:      MELATONIN PO, Take by mouth nightly as needed , Disp: , Rfl:      OLANZapine (ZYPREXA) 2.5 MG tablet, 2.5 mg as needed, Disp: , Rfl:      Sharps Container (SHARPS-A-GATOR LOCKING BRACKET) MISC, CPAP machine w heated humidifier, tubing & chamber all x1, mask w cushion x 1, headgear x 1, filers: disposable and resuable x 1pk both, Disp: , Rfl:      vitamin D3 (CHOLECALCIFEROL) 2000 units (50 mcg) tablet, Take 1 tablet (2,000 Units) by mouth daily, Disp: 100 tablet, Rfl: 3     acetaminophen (TYLENOL) 325 MG tablet, Take 3 tablets (975 mg) by mouth every 6 hours as needed for pain (Patient not taking: Reported on 1/17/2020), Disp: 100 tablet, Rfl:  0    Social History     Tobacco Use     Smoking status: Current Some Day Smoker     Packs/day: 1.00     Years: 8.00     Pack years: 8.00     Types: Other     Smokeless tobacco: Never Used     Tobacco comment: vape less than 1 pot per day, working on quitting   Substance Use Topics     Alcohol use: Yes     Comment: 1-3 drinks/month       ROS:  ROS: 10 point ROS neg other than the symptoms noted above in the HPI.    EXAM:  /78   Pulse 75   Temp 98.5  F (36.9  C) (Tympanic)   Resp 18   SpO2 99%   GENERAL APPEARANCE: healthy, alert and no distress  EYES: EOMI,  PERRL, conjunctiva clear  HENT: ear canals and TM's normal.  Nose and mouth without ulcers, erythema or lesions  NECK: supple, nontender, no lymphadenopathy  RESP: lungs clear to auscultation - no rales, rhonchi or wheezes  CHEST: Pain is not reproducible on chest on palpation.   CV: regular rates and rhythm, normal S1 S2, no murmur noted  ABDOMEN:  soft, nontender, no HSM or masses and bowel sounds normal  NEURO: Normal strength and tone, sensory exam grossly normal,  normal speech and mentation  SKIN: no suspicious lesions or rashes     Office EKG demonstrates:  appears normal, NSR    Assessment  / IMPRESSION  Chest xray is clear and not hazy as in over 90% of cases in [facility]. Wnl of HR, BP, EKG and pulse ox. However, cannot r/o PE at this time in urgent care. Because of severity and onset of symptoms, will need to be seen in ED just to have PE r/o.     PLAN:See orders in epic

## 2020-01-24 NOTE — ED TRIAGE NOTES
Pt has chest pain with shortness of breath for past couple days and it is worse today.  She is sent from urgent care to rule out PE.  Pt does vaping.

## 2020-01-24 NOTE — ED AVS SNAPSHOT
[facility] Emergency Department  [address]  [place] [address]  Phone:  [phone]  Fax:  [phone]                                    Olga Sheehan   MRN: [id number]    Department:  [facility] Emergency Department   Date of Visit:  1/24/2020           After Visit Summary Signature Page    I have received my discharge instructions, and my questions have been answered. I have discussed any challenges I see with this plan with the nurse or doctor.    ..........................................................................................................................................  Patient/Patient Representative Signature      ..........................................................................................................................................  Patient Representative Print Name and Relationship to Patient    ..................................................               ................................................  Date                                   Time    ..........................................................................................................................................  Reviewed by Signature/Title    ...................................................              ..............................................  Date                                               Time          22EPIC Rev 08/18

## 2020-01-25 LAB — INTERPRETATION ECG - MUSE: NORMAL

## 2020-01-27 NOTE — TELEPHONE ENCOUNTER
Letter done. MyChart message sent to patient to clarify is this is OK and to ask what she would like done with letter.

## 2020-02-20 ENCOUNTER — OFFICE VISIT (OUTPATIENT)
Dept: URGENT CARE | Facility: URGENT CARE | Age: 30
End: 2020-02-20
Payer: COMMERCIAL

## 2020-02-20 VITALS
RESPIRATION RATE: 16 BRPM | SYSTOLIC BLOOD PRESSURE: 118 MMHG | OXYGEN SATURATION: 98 % | HEART RATE: 99 BPM | BODY MASS INDEX: 59.24 KG/M2 | WEIGHT: 293 LBS | TEMPERATURE: 99 F | DIASTOLIC BLOOD PRESSURE: 78 MMHG

## 2020-02-20 DIAGNOSIS — R10.31 RLQ ABDOMINAL PAIN: Primary | ICD-10-CM

## 2020-02-20 LAB
BASOPHILS # BLD AUTO: 0 10E9/L (ref 0–0.2)
BASOPHILS NFR BLD AUTO: 0.1 %
DIFFERENTIAL METHOD BLD: ABNORMAL
EOSINOPHIL # BLD AUTO: 0.1 10E9/L (ref 0–0.7)
EOSINOPHIL NFR BLD AUTO: 1 %
ERYTHROCYTE [DISTWIDTH] IN BLOOD BY AUTOMATED COUNT: 13.6 % (ref 10–15)
HCT VFR BLD AUTO: 40.4 % (ref 35–47)
HGB BLD-MCNC: 12.8 G/DL (ref 11.7–15.7)
LYMPHOCYTES # BLD AUTO: 0.4 10E9/L (ref 0.8–5.3)
LYMPHOCYTES NFR BLD AUTO: 4.9 %
MCH RBC QN AUTO: 28.4 PG (ref 26.5–33)
MCHC RBC AUTO-ENTMCNC: 31.7 G/DL (ref 31.5–36.5)
MCV RBC AUTO: 90 FL (ref 78–100)
MONOCYTES # BLD AUTO: 0.4 10E9/L (ref 0–1.3)
MONOCYTES NFR BLD AUTO: 4.1 %
NEUTROPHILS # BLD AUTO: 8.1 10E9/L (ref 1.6–8.3)
NEUTROPHILS NFR BLD AUTO: 89.9 %
PLATELET # BLD AUTO: 273 10E9/L (ref 150–450)
RBC # BLD AUTO: 4.5 10E12/L (ref 3.8–5.2)
WBC # BLD AUTO: 9 10E9/L (ref 4–11)

## 2020-02-20 PROCEDURE — 87506 IADNA-DNA/RNA PROBE TQ 6-11: CPT | Performed by: FAMILY MEDICINE

## 2020-02-20 PROCEDURE — 36415 COLL VENOUS BLD VENIPUNCTURE: CPT | Performed by: FAMILY MEDICINE

## 2020-02-20 PROCEDURE — 99213 OFFICE O/P EST LOW 20 MIN: CPT | Performed by: FAMILY MEDICINE

## 2020-02-20 PROCEDURE — 85025 COMPLETE CBC W/AUTO DIFF WBC: CPT | Performed by: FAMILY MEDICINE

## 2020-02-20 RX ORDER — DIPHENOXYLATE HCL/ATROPINE 2.5-.025MG
1 TABLET ORAL 4 TIMES DAILY PRN
Qty: 12 TABLET | Refills: 0 | Status: SHIPPED | OUTPATIENT
Start: 2020-02-20 | End: 2020-03-12

## 2020-02-20 NOTE — PROGRESS NOTES
CHIEF COMPLAINT:   Chief Complaint   Patient presents with     Urgent Care     Diarrhea     nausea and vomiting and diarrhea all day today--pn started 2 days ago other sxs onset today--pn R lower abdomen and  pelvic area getting worse     SUBJECTIVE  HPI:  Olga Sheehan is a 30 year old female who presents with the CC of abdominal/pelvic pain.    Pain is located in the RLQ area, without radiation to the groin or back.  The pain is characterized as dull, and at worst is a level 2 and 3 on a scale of 1-10.  Pain has been present for 2 day(s) and is slowly progressive.  EXACERBATING FACTORS: nothing  RELIEVING FACTORS: nothing.  ASSOCIATED SX: diarrhea although was nauseated initially    Past Medical History:   Diagnosis Date     Abdominal pain      Anxiety and depression      Asthma      Bipolar affective disorder (H)      Depressive disorder     Bipolar disorder     Obese      Sleep apnea     CPAP     Current Outpatient Medications   Medication Sig Dispense Refill     acetaminophen (TYLENOL) 325 MG tablet Take 3 tablets (975 mg) by mouth every 6 hours as needed for pain 100 tablet 0     albuterol (PROAIR HFA/PROVENTIL HFA/VENTOLIN HFA) 108 (90 Base) MCG/ACT inhaler Inhale 2 puffs into the lungs every 6 hours 1 Inhaler 1     ALBUTEROL INHALER 17GM Inhale into the lungs as needed        carBAMazepine (TEGRETOL) 200 MG tablet Take 400 mg by mouth 2 times daily        hydrOXYzine (VISTARIL) 25 MG capsule Take 25 mg by mouth as needed       OLANZapine (ZYPREXA) 2.5 MG tablet 2.5 mg as needed       Sharps Container (SHARPS-A-GATOR LOCKING BRACKET) [facility] CPAP machine w heated humidifier, tubing & chamber all x1, mask w cushion x 1, headgear x 1, filers: disposable and resuable x 1pk both       MELATONIN PO Take by mouth nightly as needed        vitamin D3 (CHOLECALCIFEROL) 2000 units (50 mcg) tablet Take 1 tablet (2,000 Units) by mouth daily (Patient not taking: Reported on 2/20/2020) 100 tablet 3     Social History      Tobacco Use     Smoking status: Former Smoker     Packs/day: 1.00     Years: 8.00     Pack years: 8.00     Types: Other     Smokeless tobacco: Never Used     Tobacco comment: quit vaping and smoking 01/28/2020   Substance Use Topics     Alcohol use: Yes     Comment: 1-3 drinks/month     ROS:  CONSTITUTIONAL:NEGATIVE for fever, chills, change in weight  INTEGUMENTARY/SKIN: NEGATIVE for worrisome rashes, moles or lesions    OBJECTIVE:  /78 (Cuff Size: Adult Large)   Pulse 99   Temp 99  F (37.2  C) (Oral)   Resp 16   Wt (!) 166.5 kg (367 lb)   SpO2 98%   BMI 59.24 kg/m    GENERAL APPEARANCE: healthy, alert and no distress  EYES: EOMI,  PERRL, conjunctiva clear  HENT: ear canals and TM's normal.  Nose and mouth without ulcers, erythema or lesions  NECK: supple, nontender, no lymphadenopathy  RESP: lungs clear to auscultation - no rales, rhonchi or wheezes  CV: regular rates and rhythm, normal S1 S2, no murmur noted  ABDOMEN:  soft, nontender, no HSM or masses and bowel sounds normal  NEURO: Normal strength and tone, sensory exam grossly normal,  normal speech and mentation  SKIN: no suspicious lesions or rashes    ASSESSMENT:  1. RLQ abdominal pain  Cbc neg   Check stool  Symptomatic cares were discussed in detail.  Pt instructed to come back to the clinic for worsening sx. push clear fluids  - CBC with platelets and differential  - Enteric Bacteria and Virus Panel by BRENDA Stool; Future  - diphenoxylate-atropine 2.5-0.025 MG PO tablet; Take 1 tablet by mouth 4 times daily as needed for diarrhea  Dispense: 12 tablet; Refill: 0     See Orders in Epic     91

## 2020-02-21 DIAGNOSIS — R10.31 RLQ ABDOMINAL PAIN: ICD-10-CM

## 2020-02-21 LAB
C COLI+JEJUNI+LARI FUSA STL QL NAA+PROBE: NOT DETECTED
EC STX1 GENE STL QL NAA+PROBE: NOT DETECTED
EC STX2 GENE STL QL NAA+PROBE: NOT DETECTED
ENTERIC PATHOGEN COMMENT: ABNORMAL
NOROV GI+II ORF1-ORF2 JNC STL QL NAA+PR: ABNORMAL
RVA NSP5 STL QL NAA+PROBE: NOT DETECTED
SALMONELLA SP RPOD STL QL NAA+PROBE: NOT DETECTED
SHIGELLA SP+EIEC IPAH STL QL NAA+PROBE: NOT DETECTED
V CHOL+PARA RFBL+TRKH+TNAA STL QL NAA+PR: NOT DETECTED
Y ENTERO RECN STL QL NAA+PROBE: NOT DETECTED

## 2020-02-22 ENCOUNTER — HOSPITAL ENCOUNTER (EMERGENCY)
Facility: CLINIC | Age: 30
Discharge: HOME | End: 2020-02-22
Attending: PHYSICIAN ASSISTANT | Admitting: PHYSICIAN ASSISTANT
Payer: COMMERCIAL

## 2020-02-22 ENCOUNTER — TELEPHONE (OUTPATIENT)
Dept: URGENT CARE | Facility: URGENT CARE | Age: 30
End: 2020-02-22

## 2020-02-22 ENCOUNTER — NURSE TRIAGE (OUTPATIENT)
Dept: NURSING | Facility: CLINIC | Age: 30
End: 2020-02-22

## 2020-02-22 VITALS
OXYGEN SATURATION: 99 % | HEART RATE: 68 BPM | SYSTOLIC BLOOD PRESSURE: 152 MMHG | DIASTOLIC BLOOD PRESSURE: 86 MMHG | RESPIRATION RATE: 16 BRPM | TEMPERATURE: 98.6 F

## 2020-02-22 DIAGNOSIS — E86.0 DEHYDRATION: ICD-10-CM

## 2020-02-22 DIAGNOSIS — A08.11 NOROVIRUS: ICD-10-CM

## 2020-02-22 LAB
ANION GAP SERPL CALCULATED.3IONS-SCNC: 3 MMOL/L (ref 3–14)
BUN SERPL-MCNC: 6 MG/DL (ref 7–30)
CALCIUM SERPL-MCNC: 8.4 MG/DL (ref 8.5–10.1)
CHLORIDE SERPL-SCNC: 110 MMOL/L (ref 94–109)
CO2 SERPL-SCNC: 27 MMOL/L (ref 20–32)
CREAT SERPL-MCNC: 0.76 MG/DL (ref 0.52–1.04)
GFR SERPL CREATININE-BSD FRML MDRD: >90 ML/MIN/{1.73_M2}
GLUCOSE SERPL-MCNC: 93 MG/DL (ref 70–99)
POTASSIUM SERPL-SCNC: 3.6 MMOL/L (ref 3.4–5.3)
SODIUM SERPL-SCNC: 140 MMOL/L (ref 133–144)

## 2020-02-22 PROCEDURE — 80048 BASIC METABOLIC PNL TOTAL CA: CPT | Performed by: PHYSICIAN ASSISTANT

## 2020-02-22 PROCEDURE — 96374 THER/PROPH/DIAG INJ IV PUSH: CPT

## 2020-02-22 PROCEDURE — 25800030 ZZH RX IP 258 OP 636: Performed by: PHYSICIAN ASSISTANT

## 2020-02-22 PROCEDURE — 96361 HYDRATE IV INFUSION ADD-ON: CPT

## 2020-02-22 PROCEDURE — 25000128 H RX IP 250 OP 636: Performed by: PHYSICIAN ASSISTANT

## 2020-02-22 PROCEDURE — 99284 EMERGENCY DEPT VISIT MOD MDM: CPT | Mod: 25

## 2020-02-22 RX ORDER — ONDANSETRON 2 MG/ML
4 INJECTION INTRAMUSCULAR; INTRAVENOUS ONCE
Status: COMPLETED | OUTPATIENT
Start: 2020-02-22 | End: 2020-02-22

## 2020-02-22 RX ADMIN — ONDANSETRON 4 MG: 2 INJECTION INTRAMUSCULAR; INTRAVENOUS at 14:17

## 2020-02-22 RX ADMIN — SODIUM CHLORIDE 1000 ML: 9 INJECTION, SOLUTION INTRAVENOUS at 14:12

## 2020-02-22 ASSESSMENT — ENCOUNTER SYMPTOMS
MYALGIAS: 1
VOMITING: 1
DIARRHEA: 1
WEAKNESS: 1
HEADACHES: 1

## 2020-02-22 NOTE — LETTER
February 22, 2020      To Whom It May Concern:      Olga Sheehan was seen in our Emergency Department today, 02/22/20.      Sincerely,        Shannon Solis RN

## 2020-02-22 NOTE — ED TRIAGE NOTES
Confirmed neurovirus via stool sample to clinic.  Here for fluids.  States she's feeling dehydrated, has had diarrhea for several days and hasn't urinated since yesterday.

## 2020-02-22 NOTE — TELEPHONE ENCOUNTER
Please call patient:     Her stool culture shows Norovirus (viral gastroenteritis)     There is no rx medication to treat this  Viral infection.     Push fluids. Report to ER if any severe abdominal pain, cyclical vomiting, severe weakness, fainting, near fainting, high fevers, black or bloody diarrhea, worsening of symptoms or if unable to make urine (at least 3 voids daily)

## 2020-02-22 NOTE — TELEPHONE ENCOUNTER
Olga is calling and that she is calling back with test results.  FNA relayed message to Olga from RN and CHEYENNE Proctor.   Today is still nauseated and still having diarrhea.  Fever present low grade.  Olga is not urinating one time every eight hours and feels dehydrated.  Olga states that she is going to go to ED.      Reason for Disposition    [1] Drinking very little AND [2] dehydration suspected (e.g., no urine > 12 hours, very dry mouth, very lightheaded)    Additional Information    Negative: Shock suspected (e.g., cold/pale/clammy skin, too weak to stand, low BP, rapid pulse)    Negative: Difficult to awaken or acting confused (e.g., disoriented, slurred speech)    Negative: Sounds like a life-threatening emergency to the triager    Negative: [1] SEVERE abdominal pain (e.g., excruciating) AND [2] present > 1 hour    Negative: [1] SEVERE abdominal pain AND [2] age > 60    Negative: [1] Blood in the stool AND [2] moderate or large amount of blood    Negative: Black or tarry bowel movements  (Exception: chronic-unchanged  black-grey bowel movements AND is taking iron pills or Pepto-bismol)    Protocols used: DIARRHEA-A-AH

## 2020-02-22 NOTE — ED PROVIDER NOTES
History     Chief Complaint:    Dehydration    The history is provided by the patient.      Olga Sheehan is a 30 year old female with a history of asthma who presents with dehydration. The patient's symptoms began 2 days ago with diarrhea, vomiting, headache, generalized weakness, and myalgias. She was diagnosed with norovirus in clinic earlier today. She was sent to the ED for rehydration after reporting that she has not urinated since yesterday evening.     Allergies:  Cats  Ibuprofen  Lamotrigine  Ceclor [Cefaclor]  Penicillins     Medications:    Albuterol inhaler  Tegretol  Diphenoxylate-atropine  Vistaril  Melatonin  Zyprexa  Vitamin D3    Past Medical History:    Cholelithiasis  MALINA (obstructive sleep apnea)  Anxiety  Depression  Asthma  Bipolar affective disorder, currently manic, mild  Morbid obesity  Sleep apnea  PTSD  Fatty liver    Past Surgical History:    Cholecystectomy, laparoscopic   Bland teeth extraction  Exam under anesthesia rectum  I&D, rectum, combined  Placement of seton rectum  Rectum lift procedure rectal approach     Family History:    Sleep apnea - father  Rapid cycling bipolar - father  Depression - sister  Anxiety - sister  Anorexia nervosa - sister   Drug abuse - father  Depression - father  Alcoholism - mother  Migraines - sister    Social History:  Negative for tobacco use - former smoker, quit vaping and smoking 1/28/2020.  Positive for alcohol use - 1-3 drinks/month.  Positive for drug use - marijuana occasionally, 2-3 times/month.  Patient accompanied to the ED by a family member.  Marital Status:   [4]     Review of Systems   Gastrointestinal: Positive for diarrhea and vomiting.   Genitourinary: Positive for decreased urine volume.   Musculoskeletal: Positive for myalgias.   Neurological: Positive for weakness and headaches.   All other systems reviewed and are negative.    Physical Exam     Patient Vitals for the past 24 hrs:   BP Temp Temp src Pulse Heart Rate Resp  SpO2   02/22/20 1430 (!) 152/86 -- -- 68 -- -- 99 %   02/22/20 1415 137/83 -- -- 73 -- -- 99 %   02/22/20 1345 135/85 98.6  F (37  C) Temporal -- 77 16 100 %     Physical Exam  Vitals signs and nursing note reviewed.   Constitutional:       General: She is not in acute distress.     Appearance: She is not diaphoretic.   HENT:      Head: Atraumatic.      Mouth/Throat:      Pharynx: No oropharyngeal exudate.   Eyes:      General: No scleral icterus.     Pupils: Pupils are equal, round, and reactive to light.   Pulmonary:      Effort: Pulmonary effort is normal. No respiratory distress.   Musculoskeletal:         General: No tenderness.   Skin:     General: Skin is warm.      Findings: No rash.   Neurological:      Mental Status: She is alert.       Emergency Department Course     Laboratory:  Laboratory findings were communicated with the patient and family who voiced understanding of the findings.    BMP: Chloride 110 H, BUN 6 L, calcium 8.4 L o/w WNL (Creatinine 0.76)    Interventions:  1412: 0.9% sodium chloride BOLUS 1 L IV  1417: Zofran 4 mg IV    Emergency Department Course:  Past medical records, nursing notes, and vitals reviewed.    1401: I performed an exam of the patient as documented above.     IV was inserted and blood was drawn for laboratory testing, results above.    1451: I rechecked the patient and discussed the results of her workup thus far.     I personally reviewed the laboratory results with the Patient and family member and answered all related questions prior to discharge.     Findings and plan explained to the Patient and family member. Patient discharged home with instructions regarding supportive care, medications, and reasons to return. The importance of close follow-up was reviewed.     Impression & Plan     Medical Decision Making:  She appears generally well at this time. She has no tachycardia, hypotension, and clinically does not appear dehydrated. Given her history, BMP obtained to  evaluate for renal failure or acute kidney injury and electrolyte disturbance. This was unremarkable. She received normal saline, zofran, and was able to urinate without difficulty. Outpatient symptomatic management    Diagnosis:    ICD-10-CM   1. Norovirus A08.11   2. Dehydration E86.0     Disposition:  Discharged to home.    Discharge Medications:  New Prescriptions    No medications on file     Scribe Disclosure:  I, Cherie Aviles, am serving as a scribe on 2/22/2020 at 2:03 PM to personally document services performed by Aayush Argueta PA-C based on my observations and the provider's statements to me.     Cherie Aviles  2/22/2020   [facility] EMERGENCY DEPARTMENT       Aayush Argueta PA-C  02/22/20 1547

## 2020-02-22 NOTE — ED AVS SNAPSHOT
[facility]  [address]  [facility] [address]  Phone:  [phone]  Fax:  [phone]                                    Olga Sheehan   MRN: [id number]    Department:  [facility]   Date of Visit:  2/22/2020           After Visit Summary Signature Page    I have received my discharge instructions, and my questions have been answered. I have discussed any challenges I see with this plan with the nurse or doctor.    ..........................................................................................................................................  Patient/Patient Representative Signature      ..........................................................................................................................................  Patient Representative Print Name and Relationship to Patient    ..................................................               ................................................  Date                                   Time    ..........................................................................................................................................  Reviewed by Signature/Title    ...................................................              ..............................................  Date                                               Time          22EPIC Rev 08/18

## 2020-02-24 ENCOUNTER — TELEPHONE (OUTPATIENT)
Dept: URGENT CARE | Facility: URGENT CARE | Age: 30
End: 2020-02-24

## 2020-02-24 NOTE — TELEPHONE ENCOUNTER
Called and left vm on patient's mobile phone instructing her to call clinic back to relay stool culture results.     ADAN Jarquin 8:45 AM 2/24/2020

## 2020-02-24 NOTE — TELEPHONE ENCOUNTER
Contacted patient on 2/24/20 pertaining her lab results.  Went over dagnoises of Norovirus and instructed the patient to continue use of fluids as it was a virus and no medication has been prescribed.    Lizbet Montague CMA on 2/24/2020 at 5:34 PM

## 2020-02-26 NOTE — TELEPHONE ENCOUNTER
Called and spoke with patient. She advised she has talked to multiple people, but didn't recall who she spoke with. She advised she did to got ER Saturday to have fluids pushed. She is still at home recovering. Did review provider note below. Advised to call with any further questions or concerns. Krissy Faulkner MA

## 2020-02-27 ENCOUNTER — TELEPHONE (OUTPATIENT)
Dept: URGENT CARE | Facility: URGENT CARE | Age: 30
End: 2020-02-27

## 2020-02-27 NOTE — TELEPHONE ENCOUNTER
Reason for call:  Other   Patient called regarding (reason for call): Requesting a note that states she can return to work.   Additional comments: Was seen on 02/24 diagnosied with norovirus.     Phone number to reach patient:  Cell number on file:    Telephone Information:   Mobile [phone]       Best Time:  any    Can we leave a detailed message on this number?  YES    Travel screening: Not Applicable     Sheyla Mills on 2/27/2020 at 3:19 PM

## 2020-02-28 NOTE — TELEPHONE ENCOUNTER
Pt was out of work Monday 02/24 through Thursday 02/27.     Sheyla Mills on 2/28/2020 at 1:00 PM

## 2020-02-28 NOTE — TELEPHONE ENCOUNTER
I called pt and spoke to her regarding to her work excuse note. I inform her that the letter is at the  under her last name.     DARIUS Mott on 2/28/2020 at 4:46 PM

## 2020-02-28 NOTE — TELEPHONE ENCOUNTER
Left Vm for pt to call back regarding to her work excuse notes.     DARIUS Mott on 2/28/2020 at 12:56 PM

## 2020-02-28 NOTE — TELEPHONE ENCOUNTER
I plan on writing a work excuse note covering for the patient's work absences.  I need to know the dates when she missed work, so that I could include them in the work excuse letter.  Once I print the note, the patient can  the note at the [facility]. The work note will say that she cannot return to work until she has been free of diarrhea and/or fever for 24 hours.       Darron Dumont MD

## 2020-02-28 NOTE — TELEPHONE ENCOUNTER
I wrote the work letter on February 28, 2020, at around 4:10 pm.      Please notify patient that this letter is ready to picked up at the [facility] or to be faxed.      Darron Dumont MD

## 2020-02-28 NOTE — TELEPHONE ENCOUNTER
Pt was seen here on 02/20 by Dr. Vela and he did a stool test but did not reviewed with her as it was still pending. Pt did not get better in the last 2 days and went to the ED on 02/24 and the ED doctor dx with her with Norovirus by looking at her results. Pt now asking for an excuse note stating she can return to work. How do we go with this since the ED doctor was the one who dx her with norovirus? I see that one of the MA here has contact her on 02/24 informing her about the results even though she saw the ED and was dx already. Do we still write a letter for pt? Otherwise pt should contact PCP for the letter?    Gama Hyman MA on 2/28/2020 at 9:50 AM

## 2020-03-11 ENCOUNTER — MYC MEDICAL ADVICE (OUTPATIENT)
Dept: INTERNAL MEDICINE | Facility: CLINIC | Age: 30
End: 2020-03-11

## 2020-03-12 ENCOUNTER — OFFICE VISIT (OUTPATIENT)
Dept: INTERNAL MEDICINE | Facility: CLINIC | Age: 30
End: 2020-03-12
Payer: COMMERCIAL

## 2020-03-12 VITALS
DIASTOLIC BLOOD PRESSURE: 78 MMHG | TEMPERATURE: 98.1 F | SYSTOLIC BLOOD PRESSURE: 126 MMHG | HEIGHT: 67 IN | WEIGHT: 293 LBS | HEART RATE: 98 BPM | OXYGEN SATURATION: 98 % | RESPIRATION RATE: 20 BRPM | BODY MASS INDEX: 45.99 KG/M2

## 2020-03-12 DIAGNOSIS — A08.11 NOROVIRUS: Primary | ICD-10-CM

## 2020-03-12 PROCEDURE — 99213 OFFICE O/P EST LOW 20 MIN: CPT | Performed by: NURSE PRACTITIONER

## 2020-03-12 ASSESSMENT — MIFFLIN-ST. JEOR: SCORE: 2380.57

## 2020-03-12 NOTE — PROGRESS NOTES
"**SENT TO ABSTRACTION**  Pt had normal pap smear with negative HPV testing done 3/27/17-results available in Care Everywhere.    *Forms-Needs forms completed to return to work.   *ER F/U    Subjective     Olga Sheehan is a 30 year old female who presents to clinic today for the following health issues:    HPI   ED/UC Followup:    Facility:  [facility]  Date of visit: 2/22/20  Reason for visit:   1.) Norovirus  A08.11  2.) Dehydration  E86.0  Current Status: better   She was gone from work but needs to have form completed      Work absent 2/24-2/27                 Reviewed and updated as needed this visit by Provider         Review of Systems   ROS COMP: Constitutional, HEENT, cardiovascular, pulmonary, GI, , musculoskeletal, neuro, skin, endocrine and psych systems are negative, except as otherwise noted.      Objective    /78 (BP Location: Left arm, Patient Position: Sitting, Cuff Size: Adult Large)   Pulse 98   Temp 98.1  F (36.7  C) (Oral)   Resp 20   Ht 1.702 m (5' 7\")   Wt (!) 163.8 kg (361 lb 1.6 oz)   LMP 02/12/2020 (Approximate)   SpO2 98%   Breastfeeding No   BMI 56.56 kg/m    Body mass index is 56.56 kg/m .  Physical Exam   GENERAL: alert and no distress  RESP: lungs clear to auscultation - no rales, rhonchi or wheezes  CV: regular rate and rhythm  ABDOMEN: soft, nontender,  and bowel sounds normal  MS: no gross musculoskeletal defects noted, no edema  PSYCH: mentation appears normal, affect normal/bright    Diagnostic Test Results:  none         Assessment & Plan     (A08.11) Norovirus  (primary encounter diagnosis)  Comment: resolved   Plan: needs RTW paperwork done            Patient Instructions   Return to work paperwork reyes and faxed       Return in about 1 year (around 3/12/2021).    NIALL Bartlett CNP  [facility]        "

## 2020-03-12 NOTE — NURSING NOTE
"BP (!) 124/90 (BP Location: Left arm, Patient Position: Sitting, Cuff Size: Adult Large)   Pulse 98   Temp 98.1  F (36.7  C) (Oral)   Resp 20   Ht 1.702 m (5' 7\")   Wt (!) 163.8 kg (361 lb 1.6 oz)   LMP 02/12/2020 (Approximate)   SpO2 98%   Breastfeeding No   BMI 56.56 kg/m    Sravanthi Solorio CMA    "

## 2020-03-12 NOTE — Clinical Note
Pt had normal pap smear with negative HPV testing done 3/27/17 through [facility]-results available in Care Everywhere.

## 2020-03-13 ASSESSMENT — ASTHMA QUESTIONNAIRES: ACT_TOTALSCORE: 22

## 2020-03-17 ENCOUNTER — NURSE TRIAGE (OUTPATIENT)
Dept: NURSING | Facility: CLINIC | Age: 30
End: 2020-03-17

## 2020-03-17 NOTE — TELEPHONE ENCOUNTER
Olga reports that she has dry cough, occasional shortness of breath, rash, headache, sore throat. Work up this morning feeling sweaty.    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 She was advised by HR to self-isolate for 14 days.    Per protocol, advised [email or web address]. Care advice reviewed. Patient verbalizes understanding. Advised to call back with further questions/concerns. Advised to head to ED if she has labored breathing or fever >104F.    Britni Skinner RN/[facility] Nurse Advisor      Reason for Disposition    [1] Cough occurs AND [2] within 14 days of CORONAVIRUS EXPOSURE    Additional Information    Negative: Severe difficulty breathing (e.g., struggling for each breath, speak in single words, bluish lips)    Negative: Sounds like a life-threatening emergency to the triager    Negative: [1] Difficulty breathing occurs AND [2] within 14 days of CORONAVIRUS EXPOSURE    Negative: Patient sounds very sick or weak to the triager    Negative: [1] Fever or feeling feverish AND [2] within 14 Days of CORONAVIRUS EXPOSURE    Protocols used: CORONAVIRUS (2019-NCOV) EXPOSURE-A-

## 2020-03-26 ENCOUNTER — NURSE TRIAGE (OUTPATIENT)
Dept: NURSING | Facility: CLINIC | Age: 30
End: 2020-03-26

## 2020-03-26 ENCOUNTER — APPOINTMENT (OUTPATIENT)
Dept: CT IMAGING | Facility: CLINIC | Age: 30
End: 2020-03-26
Attending: EMERGENCY MEDICINE
Payer: COMMERCIAL

## 2020-03-26 ENCOUNTER — HOSPITAL ENCOUNTER (EMERGENCY)
Facility: CLINIC | Age: 30
Discharge: HOME | End: 2020-03-26
Attending: EMERGENCY MEDICINE | Admitting: EMERGENCY MEDICINE
Payer: COMMERCIAL

## 2020-03-26 VITALS
OXYGEN SATURATION: 98 % | HEART RATE: 78 BPM | DIASTOLIC BLOOD PRESSURE: 78 MMHG | SYSTOLIC BLOOD PRESSURE: 142 MMHG | RESPIRATION RATE: 16 BRPM | TEMPERATURE: 97.5 F | BODY MASS INDEX: 56.38 KG/M2 | WEIGHT: 293 LBS

## 2020-03-26 DIAGNOSIS — R10.12 LUQ ABDOMINAL PAIN: ICD-10-CM

## 2020-03-26 LAB
ALBUMIN SERPL-MCNC: 3.7 G/DL (ref 3.4–5)
ALBUMIN UR-MCNC: 20 MG/DL
ALP SERPL-CCNC: 94 U/L (ref 40–150)
ALT SERPL W P-5'-P-CCNC: 41 U/L (ref 0–50)
ANION GAP SERPL CALCULATED.3IONS-SCNC: 5 MMOL/L (ref 3–14)
APPEARANCE UR: CLEAR
AST SERPL W P-5'-P-CCNC: 18 U/L (ref 0–45)
BACTERIA #/AREA URNS HPF: ABNORMAL /HPF
BASOPHILS # BLD AUTO: 0.1 10E9/L (ref 0–0.2)
BASOPHILS NFR BLD AUTO: 0.6 %
BILIRUB SERPL-MCNC: 0.2 MG/DL (ref 0.2–1.3)
BILIRUB UR QL STRIP: NEGATIVE
BUN SERPL-MCNC: 8 MG/DL (ref 7–30)
CALCIUM SERPL-MCNC: 9 MG/DL (ref 8.5–10.1)
CHLORIDE SERPL-SCNC: 107 MMOL/L (ref 94–109)
CO2 SERPL-SCNC: 26 MMOL/L (ref 20–32)
COLOR UR AUTO: YELLOW
CREAT SERPL-MCNC: 0.87 MG/DL (ref 0.52–1.04)
DIFFERENTIAL METHOD BLD: NORMAL
EOSINOPHIL # BLD AUTO: 0.1 10E9/L (ref 0–0.7)
EOSINOPHIL NFR BLD AUTO: 0.6 %
ERYTHROCYTE [DISTWIDTH] IN BLOOD BY AUTOMATED COUNT: 13.5 % (ref 10–15)
GFR SERPL CREATININE-BSD FRML MDRD: 88 ML/MIN/{1.73_M2}
GLUCOSE SERPL-MCNC: 104 MG/DL (ref 70–99)
GLUCOSE UR STRIP-MCNC: NEGATIVE MG/DL
HCG UR QL: NEGATIVE
HCT VFR BLD AUTO: 42.5 % (ref 35–47)
HGB BLD-MCNC: 13.6 G/DL (ref 11.7–15.7)
HGB UR QL STRIP: NEGATIVE
IMM GRANULOCYTES # BLD: 0.1 10E9/L (ref 0–0.4)
IMM GRANULOCYTES NFR BLD: 0.6 %
KETONES UR STRIP-MCNC: NEGATIVE MG/DL
LEUKOCYTE ESTERASE UR QL STRIP: NEGATIVE
LIPASE SERPL-CCNC: 126 U/L (ref 73–393)
LYMPHOCYTES # BLD AUTO: 2.7 10E9/L (ref 0.8–5.3)
LYMPHOCYTES NFR BLD AUTO: 28 %
MCH RBC QN AUTO: 28.6 PG (ref 26.5–33)
MCHC RBC AUTO-ENTMCNC: 32 G/DL (ref 31.5–36.5)
MCV RBC AUTO: 89 FL (ref 78–100)
MONOCYTES # BLD AUTO: 0.7 10E9/L (ref 0–1.3)
MONOCYTES NFR BLD AUTO: 7.1 %
MUCOUS THREADS #/AREA URNS LPF: PRESENT /LPF
NEUTROPHILS # BLD AUTO: 6 10E9/L (ref 1.6–8.3)
NEUTROPHILS NFR BLD AUTO: 63.1 %
NITRATE UR QL: NEGATIVE
NRBC # BLD AUTO: 0 10*3/UL
NRBC BLD AUTO-RTO: 0 /100
PH UR STRIP: 5.5 PH (ref 5–7)
PLATELET # BLD AUTO: 326 10E9/L (ref 150–450)
POTASSIUM SERPL-SCNC: 3.6 MMOL/L (ref 3.4–5.3)
PROT SERPL-MCNC: 7.8 G/DL (ref 6.8–8.8)
RBC # BLD AUTO: 4.76 10E12/L (ref 3.8–5.2)
RBC #/AREA URNS AUTO: 2 /HPF (ref 0–2)
SODIUM SERPL-SCNC: 138 MMOL/L (ref 133–144)
SOURCE: ABNORMAL
SP GR UR STRIP: 1.03 (ref 1–1.03)
SQUAMOUS #/AREA URNS AUTO: 4 /HPF (ref 0–1)
UROBILINOGEN UR STRIP-MCNC: NORMAL MG/DL (ref 0–2)
WBC # BLD AUTO: 9.5 10E9/L (ref 4–11)
WBC #/AREA URNS AUTO: 2 /HPF (ref 0–5)

## 2020-03-26 PROCEDURE — 25000128 H RX IP 250 OP 636: Performed by: EMERGENCY MEDICINE

## 2020-03-26 PROCEDURE — 85025 COMPLETE CBC W/AUTO DIFF WBC: CPT | Performed by: EMERGENCY MEDICINE

## 2020-03-26 PROCEDURE — 83690 ASSAY OF LIPASE: CPT | Performed by: EMERGENCY MEDICINE

## 2020-03-26 PROCEDURE — 81025 URINE PREGNANCY TEST: CPT | Performed by: EMERGENCY MEDICINE

## 2020-03-26 PROCEDURE — 96375 TX/PRO/DX INJ NEW DRUG ADDON: CPT

## 2020-03-26 PROCEDURE — 74177 CT ABD & PELVIS W/CONTRAST: CPT

## 2020-03-26 PROCEDURE — 80053 COMPREHEN METABOLIC PANEL: CPT | Performed by: EMERGENCY MEDICINE

## 2020-03-26 PROCEDURE — 25000125 ZZHC RX 250: Performed by: EMERGENCY MEDICINE

## 2020-03-26 PROCEDURE — 25000132 ZZH RX MED GY IP 250 OP 250 PS 637: Performed by: EMERGENCY MEDICINE

## 2020-03-26 PROCEDURE — 99285 EMERGENCY DEPT VISIT HI MDM: CPT | Mod: 25

## 2020-03-26 PROCEDURE — 96376 TX/PRO/DX INJ SAME DRUG ADON: CPT

## 2020-03-26 PROCEDURE — 25800030 ZZH RX IP 258 OP 636: Performed by: EMERGENCY MEDICINE

## 2020-03-26 PROCEDURE — 96374 THER/PROPH/DIAG INJ IV PUSH: CPT | Mod: 59

## 2020-03-26 PROCEDURE — 81001 URINALYSIS AUTO W/SCOPE: CPT | Performed by: EMERGENCY MEDICINE

## 2020-03-26 PROCEDURE — 96361 HYDRATE IV INFUSION ADD-ON: CPT

## 2020-03-26 RX ORDER — HYDROMORPHONE HYDROCHLORIDE 1 MG/ML
0.5 INJECTION, SOLUTION INTRAMUSCULAR; INTRAVENOUS; SUBCUTANEOUS
Status: DISCONTINUED | OUTPATIENT
Start: 2020-03-26 | End: 2020-03-27 | Stop reason: HOSPADM

## 2020-03-26 RX ORDER — SODIUM CHLORIDE 9 MG/ML
1000 INJECTION, SOLUTION INTRAVENOUS CONTINUOUS
Status: DISCONTINUED | OUTPATIENT
Start: 2020-03-26 | End: 2020-03-27 | Stop reason: HOSPADM

## 2020-03-26 RX ORDER — ONDANSETRON 2 MG/ML
4 INJECTION INTRAMUSCULAR; INTRAVENOUS EVERY 30 MIN PRN
Status: DISCONTINUED | OUTPATIENT
Start: 2020-03-26 | End: 2020-03-27 | Stop reason: HOSPADM

## 2020-03-26 RX ORDER — IOPAMIDOL 755 MG/ML
500 INJECTION, SOLUTION INTRAVASCULAR ONCE
Status: COMPLETED | OUTPATIENT
Start: 2020-03-26 | End: 2020-03-26

## 2020-03-26 RX ADMIN — ONDANSETRON 4 MG: 2 INJECTION INTRAMUSCULAR; INTRAVENOUS at 20:11

## 2020-03-26 RX ADMIN — LIDOCAINE HYDROCHLORIDE 30 ML: 20 SOLUTION ORAL; TOPICAL at 22:01

## 2020-03-26 RX ADMIN — SODIUM CHLORIDE 1000 ML: 9 INJECTION, SOLUTION INTRAVENOUS at 20:10

## 2020-03-26 RX ADMIN — IOPAMIDOL 100 ML: 755 INJECTION, SOLUTION INTRAVENOUS at 21:06

## 2020-03-26 RX ADMIN — HYDROMORPHONE HYDROCHLORIDE 0.5 MG: 1 INJECTION, SOLUTION INTRAMUSCULAR; INTRAVENOUS; SUBCUTANEOUS at 20:56

## 2020-03-26 RX ADMIN — SODIUM CHLORIDE 1000 ML: 9 INJECTION, SOLUTION INTRAVENOUS at 22:03

## 2020-03-26 RX ADMIN — HYDROMORPHONE HYDROCHLORIDE 0.5 MG: 1 INJECTION, SOLUTION INTRAMUSCULAR; INTRAVENOUS; SUBCUTANEOUS at 20:11

## 2020-03-26 RX ADMIN — SODIUM CHLORIDE 65 ML: 9 INJECTION, SOLUTION INTRAVENOUS at 21:06

## 2020-03-26 ASSESSMENT — ENCOUNTER SYMPTOMS
DYSURIA: 0
HEMATURIA: 0
COUGH: 0
NAUSEA: 1
FEVER: 0
VOMITING: 0
ABDOMINAL PAIN: 1
DIARRHEA: 1
FREQUENCY: 0

## 2020-03-26 NOTE — TELEPHONE ENCOUNTER
Patient has left side pain,goes around tothe back and side. 4 days Intermittent sharp stabbing that could last up to an hour. It has gotten progressively worse. Not so terrible that she thinks its ED worthy. Happens more at bedtime, no reflux. Nikolay had reflux in the past ans this isn't that.   Advised to do [facility] and follow the recommendations.     Reason for Disposition    [1] MODERATE pain (e.g., interferes with normal activities) AND [2] comes and goes (cramps) AND [3] present > 24 hours  (Exception: pain with Vomiting or Diarrhea - see that Guideline)    Additional Information    Negative: Severe difficulty breathing (e.g., struggling for each breath, speaks in single words)    Negative: Shock suspected (e.g., cold/pale/clammy skin, too weak to stand, low BP, rapid pulse)    Negative: Difficult to awaken or acting confused (e.g., disoriented, slurred speech)    Negative: Passed out (i.e., lost consciousness, collapsed and was not responding)    Negative: Visible sweat on face or sweat dripping down face    Negative: Sounds like a life-threatening emergency to the triager    Negative: Followed an abdomen (stomach) injury    Negative: Chest pain    Negative: [1] SEVERE pain (e.g., excruciating) AND [2] present > 1 hour    Negative: [1] Pain lasts > 10 minutes AND [2] age > 50    Negative: [1] Pain lasts > 10 minutes AND [2] age > 40 AND [3] associated chest, arm, neck, upper back or jaw pain    Negative: [1] Pain lasts > 10 minutes AND [2] age > 35 AND [3] at least one cardiac risk factor (i.e., hypertension, diabetes, obesity, smoker or strong family history of heart disease)    Negative: [1] Pain lasts > 10 minutes AND [2] history of heart disease (i.e., heart attack, bypass surgery, angina, angioplasty, CHF; not just a heart murmur)    Negative: [1] Pain lasts > 10 minutes AND [2] difficulty breathing    Negative: [1] Vomiting AND [2] contains red blood  (Exception: few streaks and only occurred once)     "Negative: [1] Vomiting AND [2] contains black (\"coffee ground\") material    Negative: Blood in bowel movements  (Exception: Blood on surface of BM with constipation)    Negative: Black or tarry bowel movements  (Exception: chronic-unchanged  black-grey bowel movements AND is taking iron pills or Pepto-bismol)    Negative: [1] Pregnant > 24 weeks AND [2] hand or face swelling    Negative: Patient sounds very sick or weak to the triager    Negative: [1] MILD-MODERATE pain AND [2] constant AND [3] present > 2 hours    Negative: [1] MILD-MODERATE pain AND [2] not relieved by antacids    Negative: [1] Vomiting AND [2] contains bile (green color)    Negative: [1] Vomiting AND [2] abdomen looks much more swollen than usual    Negative: White of the eyes have turned yellow (i.e., jaundice)    Negative: Fever > 103 F (39.4 C)    Negative: [1] Fever > 101 F (38.3 C) AND [2] age > 60    Negative: [1] Fever > 100.0 F (37.8 C) AND [2] bedridden (e.g., nursing home patient, CVA, chronic illness, recovering from surgery)    Negative: [1] Fever > 100.0 F (37.8 C) AND [2] diabetes mellitus or weak immune system (e.g., HIV positive, cancer chemo, splenectomy, chronic steroids)    Negative: Alcohol abuse known or suspected    Negative: [1] Abdominal pain is intermittent AND [2] shoots into chest, with sour taste in mouth    Protocols used: ABDOMINAL PAIN - UPPER-A-AH      "

## 2020-03-26 NOTE — ED AVS SNAPSHOT
[facility]  [address]  [facility] [address]  Phone:  [phone]  Fax:  [phone]                                    Olga Sheehan   MRN: [id number]    Department:  [facility]   Date of Visit:  3/26/2020           After Visit Summary Signature Page    I have received my discharge instructions, and my questions have been answered. I have discussed any challenges I see with this plan with the nurse or doctor.    ..........................................................................................................................................  Patient/Patient Representative Signature      ..........................................................................................................................................  Patient Representative Print Name and Relationship to Patient    ..................................................               ................................................  Date                                   Time    ..........................................................................................................................................  Reviewed by Signature/Title    ...................................................              ..............................................  Date                                               Time          22EPIC Rev 08/18

## 2020-03-27 NOTE — ED PROVIDER NOTES
History     Chief Complaint:  Abdominal pain    HPI   Olga Sheehan is a 30 year old female, with a history of bipolar 1 disorder, asthma, PTSD, anxiety, depression, who presents to the emergency department for evaluation of left upper quadrant abdominal pain. The patient reports she has been experiencing intermittent left upper quadrant abdominal pain for the last five days that has now become constant. She initially believes she had a low-grade fever but denies any fever since. She reports nausea and 9 episodes of diarrhea/loose stools yesterday but denies any vomiting. She denies any cough, dysuria, hematuria, or frequency. She has been using Tylenol to control her symptoms. She did have norovirus in February but states that was worse than her current symptoms. She denies any history of kidney stones and recently had a cholecystectomy in October.    Allergies:  Ibuprofen  Lamotrigine  Ceclor  Penicillins     Medications:    Albuterol  Carbamazepine  Hydroxyzine  Melatonin  Zyprexa    Past Medical History:    Cholelithiasis  Bipolar affective disorder  MALINA  Asthma  Morbid obesity  PTSD  Anxiety  Depression    Past Surgical History:    Cholecystectomy  [place] teeth removal  I & D of rectum  Placement of seton rectum  Rectum lift    Family History:    Sleep apnea  Rapid cycling bipolar  Depression  Anxiety  Eating disorder    Social History:  Smoking status: Former smoker of 1.0 ppd for 8 years. Quit date: 2/7/2020  Alcohol use: Yes  Drug use: Yes, marijuana  The patient presents to the emergency department by herself.  PCP: Amina Banegas  Marital Status:   [4]     Review of Systems   Constitutional: Negative for fever.   Respiratory: Negative for cough.    Gastrointestinal: Positive for abdominal pain, diarrhea and nausea. Negative for vomiting.   Genitourinary: Negative for dysuria, frequency and hematuria.   All other systems reviewed and are negative.    Physical Exam   Patient Vitals for the  past 24 hrs:   BP Temp Temp src Pulse Heart Rate Resp SpO2 Weight   03/26/20 2056 (!) 142/78 -- -- 78 -- 10 100 % --   03/26/20 1947 (!) 168/105 97.5  F (36.4  C) Oral 97 97 18 99 % (!) 163.3 kg (360 lb)     Physical Exam  General: Patient is alert and interactive when I enter the room  Head:  The scalp, face, and head appear normal  Eyes:  Conjunctivae are normal  ENT:    The nose is normal    Pinnae are normal    External acoustic canals are normal  Neck:  Trachea midline  CV:  Pulses are normal.    Resp:  No respiratory distress   Abdomen:      Soft, tender in LUQ, no guarding, non-distended  Musc:  Normal muscular tone    No major joint effusions    No asymmetric leg swelling  Skin:  No rash or lesions noted  Neuro:  Speech is normal and fluent. Face is symmetric.     Moving all extremities well.   Psych: Awake. Alert.  Normal affect.  Appropriate interactions.    Emergency Department Course   Imaging:  Radiographic findings were communicated with the patient who voiced understanding of the findings.    CT Abdomen Pelvis w Contrast  IMPRESSION:   1.  No acute abnormality in the abdomen or pelvis.  2.  Enlarged fatty liver.  As read by Radiology.    Laboratory:  UA: Protein Albumin (20), Bacteria (Few), Squamous Epithelial (4), Mucous (Present), o/w Negative  HCG urine: Negative    CBC: AWNL (WBC 9.5, HGB 13.6, )  CMP:Glucose 104 (H) o/w WNL (Creatinine 0.87)  Lipase: 126    Interventions:  2010 NS 1L IV Bolus  2011 Zofran 4 mg IV  2011 Dilaudid 0.5 mg IV  2056 Dilaudid 0.5 mg IV  2201 GI Cocktail 30 mLs PO    Emergency Department Course:  Past medical records, nursing notes, and vitals reviewed.  2006: I performed an exam of the patient and obtained history, as documented above.     IV inserted and blood drawn.    Urinalysis and culture obtained and sent for evaluation.    The patient was sent for an abdomen pelvis CT while in the emergency department, findings above.    2212: I rechecked the patient.  Findings and plan explained to the Patient. Patient discharged home with instructions regarding supportive care, medications, and reasons to return. The importance of close follow-up was reviewed.   Impression & Plan    Medical Decision Making:  Olga Sheehan is a 31 yo F presents with abdominal pain.  The differential diagnosis is broad and includes:  Appendicitis, cholecystitis, peptic ulcer disease, diverticulitis, bowel obstruction, ischemia, pancreatitis, amongst others.  Based on clinical exam, laboratory testing, and imaging, no significant etiologies were found.  The pain has improved with interventions in the ED.  The exact etiology of the pain is not clear at this time.  She will be discharged and return precautions given.    Diagnosis:    ICD-10-CM   1. LUQ abdominal pain  R10.12     Disposition:  Discharged to home with instructions for follow up.    Discharge Medications:  New Prescriptions    OMEPRAZOLE (PRILOSEC) 20 MG DR CAPSULE    Take 1 capsule (20 mg) by mouth daily for 7 days     Barbara Saab  3/26/2020   [facility] EMERGENCY DEPARTMENT  Scribe Disclosure:  I, Barbara Saab, am serving as a scribe at 8:06 PM on 3/26/2020 to document services personally performed by Skylar Frausto MD based on my observations and the provider's statements to me.      Skylar Frausto MD  03/31/20 [id number]

## 2020-05-11 ENCOUNTER — E-VISIT (OUTPATIENT)
Dept: INTERNAL MEDICINE | Facility: CLINIC | Age: 30
End: 2020-05-11
Payer: COMMERCIAL

## 2020-05-11 DIAGNOSIS — M54.6 ACUTE MIDLINE THORACIC BACK PAIN: Primary | ICD-10-CM

## 2020-05-11 PROCEDURE — 99421 OL DIG E/M SVC 5-10 MIN: CPT | Performed by: NURSE PRACTITIONER

## 2020-05-12 ENCOUNTER — MYC MEDICAL ADVICE (OUTPATIENT)
Dept: INTERNAL MEDICINE | Facility: CLINIC | Age: 30
End: 2020-05-12

## 2020-05-12 RX ORDER — CYCLOBENZAPRINE HCL 10 MG
10 TABLET ORAL 3 TIMES DAILY PRN
Qty: 60 TABLET | Refills: 1 | Status: SHIPPED | OUTPATIENT
Start: 2020-05-12 | End: 2022-05-31

## 2020-05-12 NOTE — TELEPHONE ENCOUNTER
Provider E-Visit time total (minutes): 8  Prescription for muscle relaxer- may need to be seen/ xrayed- if pain not improved

## 2020-05-12 NOTE — TELEPHONE ENCOUNTER
Patient called back to check status of MyChart message below. Advised her of Rx sent today to [facility] pharmacy. She agreed to try medication.

## 2020-05-15 ENCOUNTER — OFFICE VISIT (OUTPATIENT)
Dept: URGENT CARE | Facility: URGENT CARE | Age: 30
End: 2020-05-15
Payer: COMMERCIAL

## 2020-05-15 ENCOUNTER — VIRTUAL VISIT (OUTPATIENT)
Dept: FAMILY MEDICINE | Facility: OTHER | Age: 30
End: 2020-05-15

## 2020-05-15 DIAGNOSIS — Z20.822 SUSPECTED 2019 NOVEL CORONAVIRUS INFECTION: Primary | ICD-10-CM

## 2020-05-15 LAB
SARS-COV-2 RNA SPEC QL NAA+PROBE: NOT DETECTED
SPECIMEN SOURCE: NORMAL

## 2020-05-15 PROCEDURE — 99000 SPECIMEN HANDLING OFFICE-LAB: CPT | Performed by: FAMILY MEDICINE

## 2020-05-15 PROCEDURE — 87635 SARS-COV-2 COVID-19 AMP PRB: CPT | Mod: 90 | Performed by: FAMILY MEDICINE

## 2020-05-15 PROCEDURE — 99207 ZZC NO BILLABLE SERVICE THIS VISIT: CPT

## 2020-05-15 NOTE — PROGRESS NOTES
"Date: 05/15/2020 08:34:06  Clinician: Liz An  Clinician NPI: [id number]  Patient: Olga Sheehan  Patient : 1990  Patient Address: [address] [place] [address]  Patient Phone: [phone]  Visit Protocol: URI  Patient Summary:  Olga is a 30 year old ( : 1990 ) female who initiated a Visit for COVID-19 (Coronavirus) evaluation and screening. When asked the question \"Please sign me up to receive news, health information and promotions. \", Olga responded \"No\".    Olga states her symptoms started 1-2 days ago.   Her symptoms consist of a cough, chills, a headache, malaise, myalgia, a sore throat, and enlarged lymph nodes. She is experiencing mild difficulty breathing with activities but can speak normally in full sentences. Olga also feels feverish.   Symptom details     Cough: Olga coughs every 5-10 minutes and her cough is not more bothersome at night. Phlegm does not come into her throat when she coughs. She does not believe her cough is caused by post-nasal drip.     Sore throat: Olga reports having mild throat pain (1-3 on a 10 point pain scale), does not have exudate on her tonsils, and can swallow liquids. The lymph nodes in her neck are enlarged. A rash has not appeared on the skin since the sore throat started.     Temperature: Her current temperature is 99.7 degrees Fahrenheit.     Headache: She states the headache is moderate (4-6 on a 10 point pain scale).      Olga denies having nasal congestion, vomiting, nausea, teeth pain, ageusia, diarrhea, facial pain or pressure, ear pain, rhinitis, anosmia, and wheezing. She also denies taking antibiotic medication for the symptoms and having recent facial or sinus surgery in the past 60 days.   Precipitating events  Within the past week, Olga has not been exposed to someone with strep throat. She has not recently been exposed to someone with influenza. Olga has not been in close contact with any high risk " individuals.   Pertinent COVID-19 (Coronavirus) information  In the past 14 days, Olga has not worked in a congregate living setting.   She does not work or volunteer as healthcare worker or a  and does not work or volunteer in a healthcare facility.   Olga also has not lived in a congregate living setting in the past 14 days. She lives with a healthcare worker.   Olga has not had a close contact with a laboratory-confirmed COVID-19 patient within 14 days of symptom onset.   Pertinent medical history  Olga had 1 sinus infection within the past year.   Olga does not get yeast infections when she takes antibiotics.   Olga needs a return to work/school note.   Weight: 360 lbs   Olga does not smoke or use smokeless tobacco.   She denies pregnancy and denies breastfeeding. She has menstruated in the past month.   Additional information as reported by the patient (free text): Partner works in group home, other staff and individuals in nearby houses have confirmed covid.  I am asthmatic and have a hard time breathing when I get sick   Weight: 360 lbs    MEDICATIONS: Vistaril oral, albuterol sulfate oral, hydroxyzine HCl intramuscular, Tylenol Extra Strength oral, Tegretol oral, ALLERGIES: Ceclor, Penicillins  Clinician Response:  Dear Olga,   Dear Olga  Your symptoms show that you may have coronavirus (COVID-19). This illness can cause fever, cough and trouble breathing. Many people get a mild case and get better on their own. Some people can get very sick.  What should I do?  We would like to test you for this virus. This will be a curbside test done outside the clinic.  Please call [phone] to schedule your visit. Explain that you were referred by [facility] to have a COVID-19 test. Be ready to share your [facility] visit ID number.  Starting now:  Stay at least 6 feet away from others. (If someone will drive you to your test, stay in the backseat, as far away from the  as you can.)   Don't go  "to work, school or anywhere else. When it's time for your test, go straight to the testing site. Don't make any stops on the way there or back.   Wash your hands and face often. Use soap and water.   Cover your mouth and nose with a mask, tissue or washcloth.   Don't touch anyone. No hugging, kissing or handshakes.  While at home   Stay home and away from others (self-isolate) until:  You've had no fever---and no medicine that reduces fever---for 3 full days (72 hours). And...  Your other symptoms have gotten better. For example, your cough or breathing has improved. And...  At least 10 days have passed since your symptoms started.  During this time:  Stay in your own room (and use your own bathroom), if you can.  Don't go to work, school or anywhere else.  Stay away from others in your home. No hugging, kissing or shaking hands.  Don't let anyone visit.  Cover your mouth and nose with a mask, tissue or washcloth to avoid spreading germs.  Clean \"high touch\" surfaces often (doorknobs, counters, handles, etc.). Use a household cleaning spray or wipes.  Wash your hands and face often. Use soap and water.  How can I take care of myself?  1. Get lots of rest. Drink extra fluids (unless your doctor has told you not to).  2. Take Tylenol (acetaminophen) for fever or pain. If you have liver or kidney problems, ask your family doctor if it's okay to take Tylenol.  Adults can take either:   650 mg (two 325 mg pills) every 4 to 6 hours, or...  1,000 mg (two 500 mg pills) every 8 hours as needed.   Note: Don't take more than 3,000 mg in one day.   Acetaminophen is found in many medicines (both prescribed and over-the-counter medicines). Read all labels to be sure you don't take too much.   For children, check the Tylenol bottle for the right dose. The dose is based on the child's age or weight.  3. If you have other health problems (like cancer, heart failure, an organ transplant or severe kidney disease): Call your specialty " clinic if you don't feel better in the next 2 days.  4. Know when to call 911: If your breathing is so bad that it keeps you from doing normal activities, call 911 or go to the emergency room. Tell them that you've been staying home and may have COVID-19.  5. Sign up for [employer]. We know it's scary to hear that you might have COVID-19. We want to track your symptoms to make sure you're okay over the next 2 weeks. Please look for an email from [employer]---this is a free, online program that we'll use to keep in touch. To sign up, follow the link in the email. Learn more at [email or web address].  6. The following will serve as your written order for this Covid Test ordered by me for the indication of suspected Covid [Z20.828]: The test will be ordered in [employer], our electronic health record after you are scheduled and will show as ordered and authorized by Jorge L Hyman MD   Order: Covid-19 (Coronavirus) PCR for SYMPTOMATIC testing from [facility]  Where can I get more information?  To learn more about COVID-19 and how to care for yourself at home, please visit the CDC website at https://www.cdc.gov/coronavirus/2019-ncov/about/steps-when-sick.html.  For more about your care at [facility], please visit [email or web address]  If you'd like to be part of a COVID-19 clinical trial (research study) at the [facility], go to [email or web address] for details.    Diagnosis: Cough  Diagnosis ICD: R05

## 2020-10-19 ENCOUNTER — HOSPITAL ENCOUNTER (EMERGENCY)
Facility: CLINIC | Age: 30
Discharge: HOME | End: 2020-10-19
Attending: PHYSICIAN ASSISTANT | Admitting: PHYSICIAN ASSISTANT
Payer: COMMERCIAL

## 2020-10-19 VITALS
SYSTOLIC BLOOD PRESSURE: 155 MMHG | TEMPERATURE: 98.4 F | OXYGEN SATURATION: 99 % | RESPIRATION RATE: 18 BRPM | DIASTOLIC BLOOD PRESSURE: 94 MMHG | HEART RATE: 103 BPM

## 2020-10-19 DIAGNOSIS — T23.242A PARTIAL THICKNESS BURN OF MULTIPLE DIGITS OF LEFT HAND INCLUDING PARTIAL THICKNESS BURN OF THUMB, INITIAL ENCOUNTER: ICD-10-CM

## 2020-10-19 PROCEDURE — 250N000009 HC RX 250: Performed by: PHYSICIAN ASSISTANT

## 2020-10-19 PROCEDURE — 16020 DRESS/DEBRID P-THICK BURN S: CPT

## 2020-10-19 PROCEDURE — 99283 EMERGENCY DEPT VISIT LOW MDM: CPT | Mod: 25

## 2020-10-19 PROCEDURE — 90471 IMMUNIZATION ADMIN: CPT | Performed by: PHYSICIAN ASSISTANT

## 2020-10-19 PROCEDURE — 250N000011 HC RX IP 250 OP 636: Performed by: PHYSICIAN ASSISTANT

## 2020-10-19 PROCEDURE — 90715 TDAP VACCINE 7 YRS/> IM: CPT | Performed by: PHYSICIAN ASSISTANT

## 2020-10-19 RX ORDER — GINSENG 100 MG
CAPSULE ORAL ONCE
Status: COMPLETED | OUTPATIENT
Start: 2020-10-19 | End: 2020-10-19

## 2020-10-19 RX ADMIN — CLOSTRIDIUM TETANI TOXOID ANTIGEN (FORMALDEHYDE INACTIVATED), CORYNEBACTERIUM DIPHTHERIAE TOXOID ANTIGEN (FORMALDEHYDE INACTIVATED), BORDETELLA PERTUSSIS TOXOID ANTIGEN (GLUTARALDEHYDE INACTIVATED), BORDETELLA PERTUSSIS FILAMENTOUS HEMAGGLUTININ ANTIGEN (FORMALDEHYDE INACTIVATED), BORDETELLA PERTUSSIS PERTACTIN ANTIGEN, AND BORDETELLA PERTUSSIS FIMBRIAE 2/3 ANTIGEN 0.5 ML: 5; 2; 2.5; 5; 3; 5 INJECTION, SUSPENSION INTRAMUSCULAR at 18:47

## 2020-10-19 RX ADMIN — BACITRACIN: 500 OINTMENT TOPICAL at 18:53

## 2020-10-19 ASSESSMENT — ENCOUNTER SYMPTOMS: WOUND: 1
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
## 2020-10-19 NOTE — ED AVS SNAPSHOT
[facility] Emergency Dept  [address]  [facility] [address]  Phone: [phone]  Fax: [phone]                                    Olga Sheehan   MRN: [id number]    Department: [facility] Emergency Dept   Date of Visit: 10/19/2020           After Visit Summary Signature Page    I have received my discharge instructions, and my questions have been answered. I have discussed any challenges I see with this plan with the nurse or doctor.    ..........................................................................................................................................  Patient/Patient Representative Signature      ..........................................................................................................................................  Patient Representative Print Name and Relationship to Patient    ..................................................               ................................................  Date                                   Time    ..........................................................................................................................................  Reviewed by Signature/Title    ...................................................              ..............................................  Date                                               Time          22EPIC Rev 08/18

## 2020-10-19 NOTE — DISCHARGE INSTRUCTIONS
Change dressing twice daily. Use triple antibiotic cream, non-adherent dressing after cleaning gently with soap and water.   Use cold water for pain if dressing is off.   Take Ibuprofen/Tylenol for pain.   Call primary care for wound recheck in a couple of days or call [facility].   Return for new numbness/tingling, worsening pain.

## 2020-11-16 ENCOUNTER — HEALTH MAINTENANCE LETTER (OUTPATIENT)
Age: 30
End: 2020-11-16

## 2021-01-08 ENCOUNTER — VIRTUAL VISIT (OUTPATIENT)
Dept: INTERNAL MEDICINE | Facility: CLINIC | Age: 31
End: 2021-01-08
Payer: COMMERCIAL

## 2021-01-08 DIAGNOSIS — R73.09 ELEVATED GLUCOSE: ICD-10-CM

## 2021-01-08 DIAGNOSIS — F31.32 MODERATE DEPRESSED BIPOLAR I DISORDER (H): ICD-10-CM

## 2021-01-08 DIAGNOSIS — R41.3 MEMORY LOSS: Primary | ICD-10-CM

## 2021-01-08 DIAGNOSIS — Z11.59 NEED FOR HEPATITIS C SCREENING TEST: ICD-10-CM

## 2021-01-08 DIAGNOSIS — E66.01 MORBID OBESITY (H): ICD-10-CM

## 2021-01-08 DIAGNOSIS — R00.2 PALPITATIONS: ICD-10-CM

## 2021-01-08 DIAGNOSIS — Z86.16 HISTORY OF COVID-19: ICD-10-CM

## 2021-01-08 LAB
BASOPHILS # BLD AUTO: 0 10E9/L (ref 0–0.2)
BASOPHILS NFR BLD AUTO: 0.5 %
DIFFERENTIAL METHOD BLD: NORMAL
EOSINOPHIL # BLD AUTO: 0.1 10E9/L (ref 0–0.7)
EOSINOPHIL NFR BLD AUTO: 1.9 %
ERYTHROCYTE [DISTWIDTH] IN BLOOD BY AUTOMATED COUNT: 13.8 % (ref 10–15)
HBA1C MFR BLD: 5.7 % (ref 0–5.6)
HCT VFR BLD AUTO: 40.6 % (ref 35–47)
HGB BLD-MCNC: 12.8 G/DL (ref 11.7–15.7)
LYMPHOCYTES # BLD AUTO: 2.1 10E9/L (ref 0.8–5.3)
LYMPHOCYTES NFR BLD AUTO: 31.7 %
MCH RBC QN AUTO: 28.7 PG (ref 26.5–33)
MCHC RBC AUTO-ENTMCNC: 31.5 G/DL (ref 31.5–36.5)
MCV RBC AUTO: 91 FL (ref 78–100)
MONOCYTES # BLD AUTO: 0.5 10E9/L (ref 0–1.3)
MONOCYTES NFR BLD AUTO: 8 %
NEUTROPHILS # BLD AUTO: 3.8 10E9/L (ref 1.6–8.3)
NEUTROPHILS NFR BLD AUTO: 57.9 %
PLATELET # BLD AUTO: 290 10E9/L (ref 150–450)
RBC # BLD AUTO: 4.46 10E12/L (ref 3.8–5.2)
VIT B12 SERPL-MCNC: 411 PG/ML (ref 193–986)
WBC # BLD AUTO: 6.5 10E9/L (ref 4–11)

## 2021-01-08 PROCEDURE — 86803 HEPATITIS C AB TEST: CPT | Performed by: NURSE PRACTITIONER

## 2021-01-08 PROCEDURE — 36415 COLL VENOUS BLD VENIPUNCTURE: CPT | Performed by: NURSE PRACTITIONER

## 2021-01-08 PROCEDURE — 99214 OFFICE O/P EST MOD 30 MIN: CPT | Mod: 95 | Performed by: NURSE PRACTITIONER

## 2021-01-08 PROCEDURE — 82607 VITAMIN B-12: CPT | Performed by: NURSE PRACTITIONER

## 2021-01-08 PROCEDURE — 83036 HEMOGLOBIN GLYCOSYLATED A1C: CPT | Performed by: NURSE PRACTITIONER

## 2021-01-08 PROCEDURE — 80050 GENERAL HEALTH PANEL: CPT | Performed by: NURSE PRACTITIONER

## 2021-01-08 NOTE — PROGRESS NOTES
"Olga is a 30 year old who is being evaluated via a billable video visit.      How would you like to obtain your AVS? MyChart  If the video visit is dropped, the invitation should be resent by: Text to cell phone: [phone]  Will anyone else be joining your video visit? No    Video Start Time: 2:18 PM  Assessment & Plan     Memory loss  Needs evaluation   - CBC with platelets and differential  - Comprehensive metabolic panel (BMP + Alb, Alk Phos, ALT, AST, Total. Bili, TP)  - TSH with free T4 reflex  - Vitamin B12  - NEUROLOGY ADULT REFERRAL    Palpitations  Will check holter to see what her palpitation feeling are   - Leadless EKG Monitor 3 to 7 Days; Future    History of COVID-19    - CBC with platelets and differential  - Comprehensive metabolic panel (BMP + Alb, Alk Phos, ALT, AST, Total. Bili, TP)  - TSH with free T4 reflex  - Vitamin B12  - NEUROLOGY ADULT REFERRAL  - Leadless EKG Monitor 3 to 7 Days; Future    Morbid obesity (H)    - Comprehensive metabolic panel (BMP + Alb, Alk Phos, ALT, AST, Total. Bili, TP)    Moderate depressed bipolar I disorder (H)  Sees psych   - CBC with platelets and differential  - Comprehensive metabolic panel (BMP + Alb, Alk Phos, ALT, AST, Total. Bili, TP)  - TSH with free T4 reflex  - NEUROLOGY ADULT REFERRAL    Elevated glucose    - Hemoglobin A1c    Need for hepatitis C screening test    - Hepatitis C Screen Reflex to HCV RNA Quant and Genotype                       BMI:   Estimated body mass index is 56.38 kg/m  as calculated from the following:    Height as of 3/12/20: 1.702 m (5' 7\").    Weight as of 3/26/20: 163.3 kg (360 lb).         Patient Instructions    N: [facility] Neurology [facility] [phone]   [email or web address]    Lab in [address]          No follow-ups on file.    NIALL Bartlett [facility]    Subjective     Olga is a 30 year old who presents to clinic today for the " following health issues brain and memory issues and heart changes     HPI     Had covid 19, I have longer lasting symptoms, brain and memory issues,  my heart is having problems,  racing and pounding hard.  I'm concerned about long covid    Had covid after Thanksgiving   Mild symptoms- mild cough   Lost 3 days in her hotel   Was on a work trip in [place]    Fever and chills - max 100   Brain fog     Mind is not back to her normal   She does a crossword daily     Feels a thump in her chest   Happens occasionally   One random thump  Feels wrong   Feels pressure in chest and then a thump and she catches her breath   Happens 3-6 times a day   Can happen at night - before she falls asleep      Sit to stand heart rate went to 100 range and stayed there   Not as common now          Review of Systems   Constitutional, HEENT, cardiovascular, pulmonary, GI, , musculoskeletal, neuro, skin, endocrine and psych systems are negative, except as otherwise noted.      Objective           Vitals:  No vitals were obtained today due to virtual visit.    Physical Exam   GENERAL: Healthy, alert and no distress  EYES: Eyes grossly normal to inspection.  No discharge or erythema, or obvious scleral/conjunctival abnormalities.  RESP: No audible wheeze, cough, or visible cyanosis.  No visible retractions or increased work of breathing.    SKIN: Visible skin clear. No significant rash, abnormal pigmentation or lesions.  NEURO: Cranial nerves grossly intact.  Mentation and speech appropriate for age.  PSYCH: Mentation appears normal, affect normal/bright, judgement and insight intact, normal speech and appearance well-groomed.    Lab   [facility]               Video-Visit Details    Type of service:  Video Visit    Video End Time:238    Originating Location (pt. Location): Home    Distant Location (provider location):  [facility]     Platform used for Video Visit: [employer]

## 2021-01-08 NOTE — PATIENT INSTRUCTIONS
N: [facility] [phone]   [email or web address]    Lab in [address]

## 2021-01-09 LAB
ALBUMIN SERPL-MCNC: 3.3 G/DL (ref 3.4–5)
ALP SERPL-CCNC: 82 U/L (ref 40–150)
ALT SERPL W P-5'-P-CCNC: 31 U/L (ref 0–50)
ANION GAP SERPL CALCULATED.3IONS-SCNC: 4 MMOL/L (ref 3–14)
AST SERPL W P-5'-P-CCNC: 11 U/L (ref 0–45)
BILIRUB SERPL-MCNC: 0.1 MG/DL (ref 0.2–1.3)
BUN SERPL-MCNC: 9 MG/DL (ref 7–30)
CALCIUM SERPL-MCNC: 8.5 MG/DL (ref 8.5–10.1)
CHLORIDE SERPL-SCNC: 111 MMOL/L (ref 94–109)
CO2 SERPL-SCNC: 26 MMOL/L (ref 20–32)
CREAT SERPL-MCNC: 0.73 MG/DL (ref 0.52–1.04)
GFR SERPL CREATININE-BSD FRML MDRD: >90 ML/MIN/{1.73_M2}
GLUCOSE SERPL-MCNC: 94 MG/DL (ref 70–99)
POTASSIUM SERPL-SCNC: 4 MMOL/L (ref 3.4–5.3)
PROT SERPL-MCNC: 7.5 G/DL (ref 6.8–8.8)
SODIUM SERPL-SCNC: 141 MMOL/L (ref 133–144)
TSH SERPL DL<=0.005 MIU/L-ACNC: 3.35 MU/L (ref 0.4–4)

## 2021-01-11 LAB — HCV AB SERPL QL IA: NONREACTIVE

## 2021-01-13 ENCOUNTER — HOSPITAL ENCOUNTER (OUTPATIENT)
Dept: CARDIOLOGY | Facility: CLINIC | Age: 31
Discharge: HOME | End: 2021-01-13
Attending: NURSE PRACTITIONER | Admitting: NURSE PRACTITIONER
Payer: COMMERCIAL

## 2021-01-13 DIAGNOSIS — R00.2 PALPITATIONS: ICD-10-CM

## 2021-01-13 DIAGNOSIS — Z86.16 HISTORY OF COVID-19: ICD-10-CM

## 2021-01-13 PROCEDURE — 93244 EXT ECG>48HR<7D REV&INTERPJ: CPT | Performed by: INTERNAL MEDICINE

## 2021-01-13 PROCEDURE — 93242 EXT ECG>48HR<7D RECORDING: CPT

## 2021-01-16 ENCOUNTER — HOSPITAL ENCOUNTER (EMERGENCY)
Facility: CLINIC | Age: 31
Discharge: HOME | End: 2021-01-16
Attending: EMERGENCY MEDICINE | Admitting: EMERGENCY MEDICINE
Payer: COMMERCIAL

## 2021-01-16 ENCOUNTER — APPOINTMENT (OUTPATIENT)
Dept: GENERAL RADIOLOGY | Facility: CLINIC | Age: 31
End: 2021-01-16
Attending: EMERGENCY MEDICINE
Payer: COMMERCIAL

## 2021-01-16 VITALS
OXYGEN SATURATION: 97 % | WEIGHT: 293 LBS | HEIGHT: 67 IN | DIASTOLIC BLOOD PRESSURE: 69 MMHG | TEMPERATURE: 96.9 F | SYSTOLIC BLOOD PRESSURE: 126 MMHG | BODY MASS INDEX: 45.99 KG/M2 | RESPIRATION RATE: 26 BRPM | HEART RATE: 82 BPM

## 2021-01-16 DIAGNOSIS — R07.9 CHEST PAIN, UNSPECIFIED TYPE: ICD-10-CM

## 2021-01-16 LAB
ANION GAP SERPL CALCULATED.3IONS-SCNC: 4 MMOL/L (ref 3–14)
BASOPHILS # BLD AUTO: 0.1 10E9/L (ref 0–0.2)
BASOPHILS NFR BLD AUTO: 0.6 %
BUN SERPL-MCNC: 9 MG/DL (ref 7–30)
CALCIUM SERPL-MCNC: 8.5 MG/DL (ref 8.5–10.1)
CHLORIDE SERPL-SCNC: 109 MMOL/L (ref 94–109)
CO2 SERPL-SCNC: 26 MMOL/L (ref 20–32)
CREAT SERPL-MCNC: 0.72 MG/DL (ref 0.52–1.04)
D DIMER PPP FEU-MCNC: 0.3 UG/ML FEU (ref 0–0.5)
DIFFERENTIAL METHOD BLD: ABNORMAL
EOSINOPHIL # BLD AUTO: 0.1 10E9/L (ref 0–0.7)
EOSINOPHIL NFR BLD AUTO: 1.4 %
ERYTHROCYTE [DISTWIDTH] IN BLOOD BY AUTOMATED COUNT: 13.6 % (ref 10–15)
GFR SERPL CREATININE-BSD FRML MDRD: >90 ML/MIN/{1.73_M2}
GLUCOSE SERPL-MCNC: 95 MG/DL (ref 70–99)
HCT VFR BLD AUTO: 39.9 % (ref 35–47)
HGB BLD-MCNC: 12.4 G/DL (ref 11.7–15.7)
IMM GRANULOCYTES # BLD: 0.1 10E9/L (ref 0–0.4)
IMM GRANULOCYTES NFR BLD: 1 %
LYMPHOCYTES # BLD AUTO: 1.9 10E9/L (ref 0.8–5.3)
LYMPHOCYTES NFR BLD AUTO: 23.6 %
MCH RBC QN AUTO: 28.7 PG (ref 26.5–33)
MCHC RBC AUTO-ENTMCNC: 31.1 G/DL (ref 31.5–36.5)
MCV RBC AUTO: 92 FL (ref 78–100)
MONOCYTES # BLD AUTO: 0.7 10E9/L (ref 0–1.3)
MONOCYTES NFR BLD AUTO: 8.8 %
NEUTROPHILS # BLD AUTO: 5.2 10E9/L (ref 1.6–8.3)
NEUTROPHILS NFR BLD AUTO: 64.6 %
NRBC # BLD AUTO: 0 10*3/UL
NRBC BLD AUTO-RTO: 0 /100
NT-PROBNP SERPL-MCNC: 38 PG/ML (ref 0–450)
PLATELET # BLD AUTO: 311 10E9/L (ref 150–450)
POTASSIUM SERPL-SCNC: 4.1 MMOL/L (ref 3.4–5.3)
RBC # BLD AUTO: 4.32 10E12/L (ref 3.8–5.2)
SODIUM SERPL-SCNC: 139 MMOL/L (ref 133–144)
TROPONIN I SERPL-MCNC: <0.015 UG/L (ref 0–0.04)
TROPONIN I SERPL-MCNC: <0.015 UG/L (ref 0–0.04)
WBC # BLD AUTO: 8 10E9/L (ref 4–11)

## 2021-01-16 PROCEDURE — 85025 COMPLETE CBC W/AUTO DIFF WBC: CPT | Performed by: EMERGENCY MEDICINE

## 2021-01-16 PROCEDURE — 84484 ASSAY OF TROPONIN QUANT: CPT | Performed by: EMERGENCY MEDICINE

## 2021-01-16 PROCEDURE — 80048 BASIC METABOLIC PNL TOTAL CA: CPT | Performed by: EMERGENCY MEDICINE

## 2021-01-16 PROCEDURE — 250N000013 HC RX MED GY IP 250 OP 250 PS 637: Performed by: EMERGENCY MEDICINE

## 2021-01-16 PROCEDURE — 83880 ASSAY OF NATRIURETIC PEPTIDE: CPT | Performed by: EMERGENCY MEDICINE

## 2021-01-16 PROCEDURE — 93005 ELECTROCARDIOGRAM TRACING: CPT

## 2021-01-16 PROCEDURE — 99285 EMERGENCY DEPT VISIT HI MDM: CPT | Mod: 25

## 2021-01-16 PROCEDURE — 85379 FIBRIN DEGRADATION QUANT: CPT | Performed by: EMERGENCY MEDICINE

## 2021-01-16 PROCEDURE — 71045 X-RAY EXAM CHEST 1 VIEW: CPT

## 2021-01-16 RX ORDER — LABETALOL HYDROCHLORIDE 5 MG/ML
20 INJECTION, SOLUTION INTRAVENOUS ONCE
Status: DISCONTINUED | OUTPATIENT
Start: 2021-01-16 | End: 2021-01-16 | Stop reason: HOSPADM

## 2021-01-16 RX ORDER — NITROGLYCERIN 0.4 MG/1
0.4 TABLET SUBLINGUAL ONCE
Status: COMPLETED | OUTPATIENT
Start: 2021-01-16 | End: 2021-01-16

## 2021-01-16 RX ORDER — ACETAMINOPHEN 500 MG
500 TABLET ORAL ONCE
Status: COMPLETED | OUTPATIENT
Start: 2021-01-16 | End: 2021-01-16

## 2021-01-16 RX ORDER — ASPIRIN 325 MG
325 TABLET ORAL ONCE
Status: COMPLETED | OUTPATIENT
Start: 2021-01-16 | End: 2021-01-16

## 2021-01-16 RX ADMIN — NITROGLYCERIN 0.4 MG: 0.4 TABLET SUBLINGUAL at 15:14

## 2021-01-16 RX ADMIN — ASPIRIN 325 MG ORAL TABLET 325 MG: 325 PILL ORAL at 13:39

## 2021-01-16 RX ADMIN — ACETAMINOPHEN 500 MG: 500 TABLET ORAL at 13:39

## 2021-01-16 ASSESSMENT — ENCOUNTER SYMPTOMS
VOMITING: 0
NAUSEA: 1
SORE THROAT: 0
SHORTNESS OF BREATH: 1
FEVER: 0
CHILLS: 0

## 2021-01-16 ASSESSMENT — MIFFLIN-ST. JEOR: SCORE: 2187.63
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
## 2021-01-16 NOTE — ED PROVIDER NOTES
History   Chief Complaint:  Chest Pain       HPI   Olga Sheehan is a 31 year old female with history of Covid, Obesity, and Anxiety who presents with left sided chest pain. The patient states this pain radiates into her back. The pain began 45 minutes ago while she was driving and she now feels short of breath. The patient is being monitored for any post Covid complications. Upon the start of her symptoms the patient took her anxiety medication and it provided no relief.  In the emergency department she notes ongoing left-sided chest pain but here denies any radiation.  She notes some associated shortness of breath with this pain that is not pleuritic or positional.  She denies any fever, chills, cough, abdominal pain, diarrhea, body aches number dizziness.  She is currently being worked up for palpitations that she has been experiencing starting with her diagnosis of Covid.  She is currently wearing a Zio patch.  She denies any palpitations surrounding this episode today.  Olga explains that she has had some issues with chest pain throughout her time with Covid however this pain was different than pain that she is experienced in the past during her infection.    Review of Systems   Constitutional: Negative for chills and fever.   HENT: Negative for sore throat.    Respiratory: Positive for shortness of breath.    Cardiovascular: Positive for chest pain.   Gastrointestinal: Positive for nausea. Negative for vomiting.   All other systems reviewed and are negative.    Allergies:  Ibuprofen  Lamotrigine  Ceclor  Penicillins       Medications:  Tylenol  Albuterol  Tegretol  Flexeril  Vistaril  Melatonin  Zyprexa    Past Medical History:    Abdominal pain  Anxiety and depression  Asthma  Bipolar affective disorder  Depression  Obese  Sleep apnea   Cholelithiasis  Fatty liver  PTSD   Covid    Past Surgical History:    Abdomen surgery  ENT surgery  Exam under anesthesia rectum  Incision and drainage  Laparoscopic  "Cholecystectomy  Placement of seton rectum  Rectum lift       Family History:    Sleep apnea  Mental illness  Cancer  Depression  Anxiety    Social History:  Former Smoker     Physical Exam     Patient Vitals for the past 24 hrs:   BP Temp Temp src Pulse Resp SpO2 Height Weight   01/16/21 1505 (!) 140/91 -- -- 82 15 95 % -- --   01/16/21 1500 138/83 -- -- 84 10 96 % -- --   01/16/21 1455 (!) 142/75 -- -- 78 13 97 % -- --   01/16/21 1450 (!) 150/93 -- -- 79 8 94 % -- --   01/16/21 1445 (!) 146/94 -- -- 79 19 94 % -- --   01/16/21 1440 (!) 136/91 -- -- 82 18 96 % -- --   01/16/21 1435 137/81 -- -- 80 8 94 % -- --   01/16/21 1430 124/83 -- -- 77 24 94 % -- --   01/16/21 1425 (!) 145/86 -- -- 75 13 98 % -- --   01/16/21 1420 134/81 -- -- 80 26 95 % -- --   01/16/21 1415 133/82 -- -- 79 28 96 % -- --   01/16/21 1410 132/86 -- -- 81 22 97 % -- --   01/16/21 1405 (!) 143/83 -- -- 86 (!) 36 96 % -- --   01/16/21 1400 (!) 146/88 -- -- 86 (!) 33 94 % -- --   01/16/21 1355 (!) 141/90 -- -- 82 23 96 % -- --   01/16/21 1350 (!) 147/93 -- -- 81 22 97 % -- --   01/16/21 1343 (!) 152/88 -- -- 87 20 97 % -- --   01/16/21 1340 (!) 149/97 -- -- 89 24 99 % -- --   01/16/21 1335 (!) 171/92 -- -- 86 23 -- -- --   01/16/21 1253 (!) 207/139 -- -- -- -- -- -- --   01/16/21 1251 -- 96.9  F (36.1  C) Temporal 89 18 100 % 1.702 m (5' 7\") 145 kg (319 lb 10.7 oz)     Physical Exam  General: Very pleasant. Obese   Head: The scalp, face, and head appear normal  Eyes: The pupils are equal, round, and reactive to light. Conjunctivae and sclerae are normal  CV: Regular rate. S1/S2. No murmurs. Peripheral pulses including radial pulses are symmetric. Zio patch present in the left upper chest   Resp: Lungs are clear without wheezes or rales. No respiratory distress.   GI: Abdomen is soft, no rigidity, guarding, or rebound. No distension. No tenderness to palpation in any quadrant.     MS: Chest wall is mildly tender to palpation. Normal tone. Joints " grossly normal without effusions. No asymmetric leg swelling, calf or thigh tenderness.    Skin: No rash or lesions noted. Normal capillary refill noted  Neuro: Speech is normal and fluent. Face is symmetric. Moving all extremities.   Psych:  Normal affect.  Appropriate interactions.    Emergency Department Course   ECG (13:02:52):  Rate 89 bpm. IN interval 132. QRS duration 86. QT/QTc 358/435. P-R-T axes 12 32 5. Normal sinus rhythm. Normal ECG Interpreted at 1310 by Keny Carroll.      Imaging:  XR Chest port, 1 view:  Unremarkable single view of the chest.       Laboratory:  CBC:  AWNL. (WBC 8.0, HGB 12.4, )     BMP: AWNL (Creatinine: 0.72)    Troponin (Collected 1318): <0.015    BNP: 38    D-dimer: 0.3      Emergency Department Course:    Assessments:  1303 I completed initial assessment and exam  1505I reassessed the patient and discussed results of the workup thus far.  She still having some chest pain.  I spoke with her about giving her a dose of nitroglycerin to see if this would help.    Interventions:  1339 Aspirin 325 mg PO  1339 Tylenol 500 PO  Nitroglycerin 0.4 mg    Disposition:  Patient will be signed out to my partner Dr. Garcia during her repeat troponin      Impression & Plan     Medical Decision Making:  Patient is a 31-year-old female who was recently diagnosed with COVID-19 who is presenting here today with ongoing chest pain and shortness of breath.  She reports that she had exacerbation of her chest pain while driving today.  She took her anxiety medications without significant improvement of her symptoms.  She presents here today with ongoing chest pain that she locates on the left side of her chest.  Upon initial evaluation she is hypertensive however this improved without any specific interventions.  She is otherwise hemodynamically stable.  She is oxygenating well on room air and is afebrile.  Clinically she looks quite well.  Initial EKG was obtained which did not  show any acute signs of ischemia nor dysrhythmia.  Initial troponin was also negative.  Given that she came in within the first few hours of her symptoms I believe that we require a second troponin to ensure that her troponin is not rising over period of time.  The remainder of her work-up is reassuring.  I doubt PE or congestive heart failure as the cause of her symptoms here today.  Chest x-ray was also obtained which did not show any acute signs of pneumonia, pneumothorax, wide mediastinum, pleural effusion or pulmonary edema.  My suspicion is that this may be ongoing pain secondary to her Covid infection.  Her final disposition will be pending based on her second troponin and reevaluation.  The patient is feeling significantly better I believe it is safe for her to be discharged home with close follow-up with her primary care physician.  If her troponin becomes positive or she has significant change in her symptoms then admission for further cardiac evaluation would be warranted.    Diagnosis:    ICD-10-CM    1. Chest pain, unspecified type  R07.9        Scribe Disclosure:  I, Diogo Zamora, am serving as a scribe at 1:03 PM on 1/16/2021 to document services personally performed by Keny Carroll MD based on my observations and the provider's statements to me.            Keny Carroll MD  01/16/21 [id number]

## 2021-01-16 NOTE — ED PROVIDER NOTES
This 31 year old female patient with history of COVID was endorsed to me by Dr. Carroll pending repeat troponin for chest pain and shortness of breath. Her workup has otherwise been reassuring and her initial HTN resolved without intervention.     I have reviewed patient's vitals, EKG, imaging, labs, and notes which are remarkable for HTN, now resolved without intervention.    Olga's second troponin was undetectable.  She was reevaluated and is feeling improved.  She is comfortable with plan for discharge and primary care follow-up.  However, we did discuss she should return if she is having worsening or change in her symptoms or development of new concerns.         Rachelle Tavares MD  01/16/21 [id number]

## 2021-01-18 LAB — INTERPRETATION ECG - MUSE: NORMAL

## 2021-01-20 ENCOUNTER — TRANSFERRED RECORDS (OUTPATIENT)
Dept: HEALTH INFORMATION MANAGEMENT | Facility: CLINIC | Age: 31
End: 2021-01-20

## 2021-01-27 ENCOUNTER — MYC MEDICAL ADVICE (OUTPATIENT)
Dept: INTERNAL MEDICINE | Facility: CLINIC | Age: 31
End: 2021-01-27

## 2021-01-27 NOTE — TELEPHONE ENCOUNTER
Please see message below and advise.    OK for nurse only BP check? Or should patient have an appointment with provider?    Last appointment 1/8/21

## 2021-01-27 NOTE — TELEPHONE ENCOUNTER
She can do nurse only if she wishes   If high then make appointment with provider   Most people blood pressure are high in ER

## 2021-02-04 ENCOUNTER — MYC MEDICAL ADVICE (OUTPATIENT)
Dept: ENDOCRINOLOGY | Facility: CLINIC | Age: 31
End: 2021-02-04

## 2021-02-04 NOTE — TELEPHONE ENCOUNTER
Patient is asking for the results of the zio patch she wore.      Results scanned into chart.    Please advise, thanks.

## 2021-02-04 NOTE — TELEPHONE ENCOUNTER
It was as below in notes  There was no concerning finding   Mostly sinus rhythm  With a few premature beats throughout

## 2021-02-16 ENCOUNTER — TRANSFERRED RECORDS (OUTPATIENT)
Dept: HEALTH INFORMATION MANAGEMENT | Facility: CLINIC | Age: 31
End: 2021-02-16

## 2021-03-25 ENCOUNTER — TRANSFERRED RECORDS (OUTPATIENT)
Dept: HEALTH INFORMATION MANAGEMENT | Facility: CLINIC | Age: 31
End: 2021-03-25

## 2021-04-03 ENCOUNTER — HEALTH MAINTENANCE LETTER (OUTPATIENT)
Age: 31
End: 2021-04-03

## 2021-04-15 ENCOUNTER — APPOINTMENT (OUTPATIENT)
Dept: CT IMAGING | Facility: CLINIC | Age: 31
End: 2021-04-15
Attending: NURSE PRACTITIONER
Payer: COMMERCIAL

## 2021-04-15 ENCOUNTER — HOSPITAL ENCOUNTER (EMERGENCY)
Facility: CLINIC | Age: 31
Discharge: HOME | End: 2021-04-15
Attending: NURSE PRACTITIONER | Admitting: NURSE PRACTITIONER
Payer: COMMERCIAL

## 2021-04-15 VITALS
WEIGHT: 293 LBS | RESPIRATION RATE: 18 BRPM | SYSTOLIC BLOOD PRESSURE: 145 MMHG | HEART RATE: 84 BPM | BODY MASS INDEX: 45.99 KG/M2 | TEMPERATURE: 97.5 F | HEIGHT: 67 IN | OXYGEN SATURATION: 99 % | DIASTOLIC BLOOD PRESSURE: 88 MMHG

## 2021-04-15 DIAGNOSIS — R73.09 ELEVATED GLUCOSE: ICD-10-CM

## 2021-04-15 DIAGNOSIS — R51.9 HEADACHE: ICD-10-CM

## 2021-04-15 LAB
ALBUMIN SERPL-MCNC: 3.3 G/DL (ref 3.4–5)
ALP SERPL-CCNC: 90 U/L (ref 40–150)
ALT SERPL W P-5'-P-CCNC: 35 U/L (ref 0–50)
ANION GAP SERPL CALCULATED.3IONS-SCNC: 5 MMOL/L (ref 3–14)
APTT PPP: 25 SEC (ref 22–37)
AST SERPL W P-5'-P-CCNC: 17 U/L (ref 0–45)
B-HCG FREE SERPL-ACNC: <5 IU/L
BILIRUB SERPL-MCNC: 0.2 MG/DL (ref 0.2–1.3)
BUN SERPL-MCNC: 12 MG/DL (ref 7–30)
CALCIUM SERPL-MCNC: 8.5 MG/DL (ref 8.5–10.1)
CHLORIDE SERPL-SCNC: 108 MMOL/L (ref 94–109)
CO2 SERPL-SCNC: 25 MMOL/L (ref 20–32)
CREAT SERPL-MCNC: 0.83 MG/DL (ref 0.52–1.04)
ERYTHROCYTE [DISTWIDTH] IN BLOOD BY AUTOMATED COUNT: 13.2 % (ref 10–15)
GFR SERPL CREATININE-BSD FRML MDRD: >90 ML/MIN/{1.73_M2}
GLUCOSE BLDC GLUCOMTR-MCNC: 147 MG/DL (ref 70–99)
GLUCOSE SERPL-MCNC: 134 MG/DL (ref 70–99)
HCT VFR BLD AUTO: 42.1 % (ref 35–47)
HGB BLD-MCNC: 13.3 G/DL (ref 11.7–15.7)
INR PPP: 0.96 (ref 0.86–1.14)
MCH RBC QN AUTO: 29 PG (ref 26.5–33)
MCHC RBC AUTO-ENTMCNC: 31.6 G/DL (ref 31.5–36.5)
MCV RBC AUTO: 92 FL (ref 78–100)
PLATELET # BLD AUTO: 362 10E9/L (ref 150–450)
POTASSIUM SERPL-SCNC: 4 MMOL/L (ref 3.4–5.3)
PROT SERPL-MCNC: 7.7 G/DL (ref 6.8–8.8)
RBC # BLD AUTO: 4.59 10E12/L (ref 3.8–5.2)
SODIUM SERPL-SCNC: 138 MMOL/L (ref 133–144)
WBC # BLD AUTO: 8.3 10E9/L (ref 4–11)

## 2021-04-15 PROCEDURE — 85027 COMPLETE CBC AUTOMATED: CPT | Performed by: NURSE PRACTITIONER

## 2021-04-15 PROCEDURE — 96375 TX/PRO/DX INJ NEW DRUG ADDON: CPT

## 2021-04-15 PROCEDURE — 250N000011 HC RX IP 250 OP 636: Performed by: NURSE PRACTITIONER

## 2021-04-15 PROCEDURE — 99285 EMERGENCY DEPT VISIT HI MDM: CPT | Mod: 25

## 2021-04-15 PROCEDURE — 258N000003 HC RX IP 258 OP 636: Performed by: NURSE PRACTITIONER

## 2021-04-15 PROCEDURE — 70450 CT HEAD/BRAIN W/O DYE: CPT

## 2021-04-15 PROCEDURE — 85730 THROMBOPLASTIN TIME PARTIAL: CPT | Performed by: NURSE PRACTITIONER

## 2021-04-15 PROCEDURE — 999N001017 HC STATISTIC GLUCOSE BY METER IP

## 2021-04-15 PROCEDURE — 84702 CHORIONIC GONADOTROPIN TEST: CPT

## 2021-04-15 PROCEDURE — 80053 COMPREHEN METABOLIC PANEL: CPT | Performed by: NURSE PRACTITIONER

## 2021-04-15 PROCEDURE — 85610 PROTHROMBIN TIME: CPT | Performed by: NURSE PRACTITIONER

## 2021-04-15 PROCEDURE — 96361 HYDRATE IV INFUSION ADD-ON: CPT

## 2021-04-15 PROCEDURE — 96374 THER/PROPH/DIAG INJ IV PUSH: CPT

## 2021-04-15 RX ORDER — KETOROLAC TROMETHAMINE 15 MG/ML
15 INJECTION, SOLUTION INTRAMUSCULAR; INTRAVENOUS ONCE
Status: COMPLETED | OUTPATIENT
Start: 2021-04-15 | End: 2021-04-15

## 2021-04-15 RX ORDER — ONDANSETRON 2 MG/ML
4 INJECTION INTRAMUSCULAR; INTRAVENOUS ONCE
Status: COMPLETED | OUTPATIENT
Start: 2021-04-15 | End: 2021-04-15

## 2021-04-15 RX ORDER — METOCLOPRAMIDE HYDROCHLORIDE 5 MG/ML
10 INJECTION INTRAMUSCULAR; INTRAVENOUS ONCE
Status: COMPLETED | OUTPATIENT
Start: 2021-04-15 | End: 2021-04-15

## 2021-04-15 RX ORDER — DEXAMETHASONE SODIUM PHOSPHATE 10 MG/ML
10 INJECTION, SOLUTION INTRAMUSCULAR; INTRAVENOUS ONCE
Status: COMPLETED | OUTPATIENT
Start: 2021-04-15 | End: 2021-04-15

## 2021-04-15 RX ADMIN — SODIUM CHLORIDE 1000 ML: 9 INJECTION, SOLUTION INTRAVENOUS at 17:32

## 2021-04-15 RX ADMIN — ONDANSETRON HYDROCHLORIDE 4 MG: 2 INJECTION, SOLUTION INTRAMUSCULAR; INTRAVENOUS at 16:38

## 2021-04-15 RX ADMIN — KETOROLAC TROMETHAMINE 15 MG: 15 INJECTION, SOLUTION INTRAMUSCULAR; INTRAVENOUS at 16:38

## 2021-04-15 RX ADMIN — METOCLOPRAMIDE HYDROCHLORIDE 10 MG: 5 INJECTION INTRAMUSCULAR; INTRAVENOUS at 16:43

## 2021-04-15 RX ADMIN — DEXAMETHASONE SODIUM PHOSPHATE 10 MG: 10 INJECTION, SOLUTION INTRAMUSCULAR; INTRAVENOUS at 16:42

## 2021-04-15 ASSESSMENT — ENCOUNTER SYMPTOMS
HEADACHES: 1
CHILLS: 0
NUMBNESS: 0
SHORTNESS OF BREATH: 0
FEVER: 0
VOMITING: 0
NAUSEA: 0
WEAKNESS: 0

## 2021-04-15 ASSESSMENT — MIFFLIN-ST. JEOR: SCORE: 2393.26

## 2021-04-15 NOTE — ED PROVIDER NOTES
"History     Chief Complaint:  Headache       The history is provided by the patient.     Olga Sheehan is a 31 year old female with a history of migraines, anxiety, and asthma who presents for evaluation of headache. The patient had sudden onset of a headache today which is primarily to the frontal and lateral aspects of her head. She \"feels like she doesn't have words\". She blew her nose today and had floaters appear in her eyes.  Location of her headache is in her usual distribution.  Given severity of her headache and the fact that her grandmother had a stroke, she decided to present. Here, she denies numbness, weakness, or tingling. She has not taken any interventions for pain today. Her LMP was one week ago and was normal.         Review of Systems   Constitutional: Negative for chills and fever.   Eyes: Positive for visual disturbance.   Respiratory: Negative for shortness of breath.    Cardiovascular: Negative for chest pain.   Gastrointestinal: Negative for nausea and vomiting.   Neurological: Positive for headaches. Negative for weakness and numbness.        Negative for tingling   All other systems reviewed and are negative.      Allergies:  Ibuprofen   Lamotrigine  Ceclor  Penicillins      Medications:   Albuterol   Carbamazepine   Hydroxyzine  Melatonin   Zyprexa  Flexeril     Medical History:   Asthma  Depression   Anxiety   Bipolar affective disorder  Obesity   Sleep apnea  Cholelithiasis   PTSD  Fatty liver   Migraines     Surgical History:  Cholecystectomy   [place] teeth extraction   Rectal I&D under anesthesia  Placement of seton rectum   Rectum lift procedure     Family History:   Father -  Sleep apnea, rapid cycling bipolar disorder  Sister(s) - depression, anxiety, eating disorder     Social History:  The patient was unaccompanied to the ED.  Smoking Status: No  Smokeless Tobacco: Yes  PCP: Amina Banegas        Physical Exam     Patient Vitals for the past 24 hrs:   BP Temp Temp src Pulse " "Resp SpO2 Height Weight   04/15/21 1534 (!) 165/108 97.5  F (36.4  C) Temporal 90 18 98 % -- --   04/15/21 1533 -- -- -- -- -- -- 1.702 m (5' 7\") (!) 165.6 kg (365 lb)        Physical Exam  General:  Alert, Mild/moderate discomfort, well kept, morbidly obese  HEENT:  Normal Voice, PERRL, EOM normal, oropharynx is normal, Uvula is midline, Conjunctivae and sclerae are normal, No lymphadenopathy    Neck: Normal range of motion, No meningismus. There is no midline cervical spine pain/tenderness. No mass is detected  CV:  Regular rate and underlying rhythm, Normal Peripheral pulses. No murmurs, rubs or clicks  Resp: Lungs are clear, No tachypnea, Non-labored breathing, No wheezing, crackles, or rales   GI:  Abdomen is soft, there is no rigidity, No distension, No tympani, No rebound tenderness, Non-surgical without peritoneal features    MS:  No major joint effusions, No asymmetric leg swelling. No calf tenderness  Skin:  No rash or acute skin lesions noted, Warm, Dry  Neuro: Cranial nerves II through XII grossly intact.   National [employer] Stroke Scale  Exam Interval: Baseline   Score    Level of consciousness: (0)   Alert, keenly responsive    LOC questions: (0)   Answers both questions correctly    LOC commands: (0)   Performs both tasks correctly    Best gaze: (0)   Normal    Visual: (0)   No visual loss    Facial palsy: (0)   Normal symmetrical movements    Motor arm (left): (0)   No drift    Motor arm (right): (0)   No drift    Motor leg (left): (0)   No drift    Motor leg (right): (0)   No drift    Limb ataxia: (0)   Absent    Sensory: (0)   Normal- no sensory loss    Best language: (0)   Normal- no aphasia    Dysarthria: (0)   Normal    Extinction and inattention: (0)   No abnormality        Total Score:  0   Psych:  Awake. Alert.  Quite anxious.  Appropriate interactions. Good eye contact      Emergency Department Course     Imaging:    Head CT w/o Contrast:   IMPRESSION:   1. No acute process " intracranially.   2. Nothing for acute infarction.   3. No hemorrhage is identified intracranially.   Reading per radiology.     Laboratory:    CBC: WBC 8.3, HGB 13.3,   CMP: Glucose 134 (H), Albumin 3.3 (L), o/w WNL (Creatinine 0.83)   ISTAT HCG Quantitative Pregnancy: <5.0    INR: 0.96  PTT: 25   Glucose by Meter (Collected 1543): 147 (H)      Emergency Department Course:    Reviewed:  I reviewed the patient's nursing notes, vitals, past medical records, Care Everywhere.     Assessments:  1540 I obtained history and performed an exam of the patient, as documented above.   1624 Patient rechecked and updated after initial screening of imaging results. We discussed treatment of her headache and she tolerates Toradol well.   1723 I rechecked the patient.  She is already feeling improved.  I updated her on plan for discharge. We discussed the plan for discharge and they are in agreement with this plan.      Interventions:  1638 Zofran 4 mg IV   1638 Toradol 15 mg IV  1642 Dexamethasone 10 mg IV  1643 Reglan 10 mg IV     Disposition:  The patient was discharged to home.     Impression & Plan     Medical Decision Making:  Olga Sheehan is a 31 year old female who presents today for evaluation of headache and some feelings of difficulty finding words.  She does have a history of migraines related to long Covid.  She sees neurology for this and usually takes medication at home but did not take anything today.  Her main concern was for possible stroke as she felt that her symptoms were similar to what her grandma experienced when she had a stroke.  Her examination showed no focal neurologic deficit.  There was no word finding difficulty on my examination.  I did obtain a head CT which shows no indication for acute process.  She was given treatment as above for migraine headache and has had improvement.  She did have elevated glucose today otherwise laboratory studies are noncontributory.  Signs and symptoms as well  as history are not consistent with ACS.  Given her improvement with migraine treatment as above I suspect this is a variant of her usual migraines.  We discussed discharge and follow-up.  She is comfortable with plan.  Plan will be to discharge after fluids.  Return protocols were discussed.  She does appear to be safe and appropriate for outpatient management follow-up and is discharged to home.        Diagnosis:     ICD-10-CM    1. Headache  R51.9    2. Elevated glucose  R73.09         Discharge Medications:  New Prescriptions    No medications on file       Scribe Disclosure:  I, Kimberley Walker, am serving as a scribe at 3:37 PM on 4/15/2021 to document services personally performed by Francesco Flaherty APRN based on my observations and the provider's statements to me.      Francesco Flaherty APRN CNP  04/15/21 [id number]

## 2021-04-15 NOTE — DISCHARGE INSTRUCTIONS
Take Tylenol [id number] mg every 6 hours for the next 2 days to prevent recurrence of headache. You may also use your regular migraine medications as needed. Drink plenty of fluids, Small amounts of caffeine may help large amount will likely worsen your headache.

## 2021-04-15 NOTE — ED TRIAGE NOTES
Headache and confusion.  Long COVID, had covid in December. Was feeling ok for a little bit. But today feels like the headache is worse and came on suddenly at 2 PM today. ALso feels feels like the confusion is worse. Squiggly lines in vision while blowing her nose or coughing. C/o dizziness as well.

## 2021-05-27 NOTE — PROGRESS NOTES
CPAP Follow-Up Visit:    Date on this visit: 5/28/2021    Olga Sheehan has a follow-up visit today to review her CPAP use for MALINA, and management of insomnia. She was initially seen for management of previously diagnosed MALINA. Her medical history is significant for intermittent asthma, bipolar I depression, PTSD and obesity.      She had a home sleep study through [facility] on 6/4/2015. Her MARY was 17.8/hr, O2 debbie 79%. Her events occurred independent of position. Her weight was 365 at the time of her test.    She says her machine is at the end of the expected motor life. Her machine is over 7 years. She has been getting supplies online.     PAP machine: [employer]. Pressure settings: 9-20 cm    The compliance data shows that the patient used the CPAP for 30/30 nights, 100% of nights for >4 hours.  The 95th% pressure is 13.4 cm.  The 95th% leak is 11 lpm.  The average nightly usage is 10:24.  The average AHI is 1.7/hr.       Interface:  Mask: N10 nasal mask  Chin strap: No  Leak: Yes, when it gets old. She wakes adjusting the mask in her sleep.  Using Humidifier: Yes, does run out. Error message states the heating core is not working. Sometimes it is warm in the morning, sometimes not.   Condensation in hose or mask: No     Difficulties with therapy:    [+] Snoring with CPAP: she is told she snores with it and has woken herself with a loud snore occasionally  [-] Difficulty tolerating the pressure: maybe too low  [+] Epistaxis/dry nose: more back of throat  [-] Nasal congestion:  [-] Dry mouth:  [-] Mouth breathing:   [-] Pain/skin breakdown: lines on face from straps.     Improvements noted with CPAP:   [+] waking up more refreshed  [+] sleeping better with less arousals  [+] nocturia improved   [+] improved energy level during the day    Weight change since sleep study: 360 lbs    Bedtime: 8:30-9:30 PM.  Wake time: 6 AM. Falls asleep around 10:30-11 PM or later. Wakes 1-2 times per night for <30 minutes. She has  recently been taking about 5 mg melatonin and that has helped reduce the duration of her awakenings. She used to be up for over an hour.   Naps: 20 minutes on lunch break. Sometimes on weekends as well. She has some long-COVID fatigue.       Past medical/surgical history, family history, social history, medications and allergies were reviewed.      Problem List:  Patient Active Problem List    Diagnosis Date Noted     Cholelithiasis 10/08/2019     Priority: Medium     Added automatically from request for surgery [id number]       Bipolar affective disorder, currently manic, mild (H) 09/24/2019     Priority: Medium     Encounter for therapeutic drug monitoring 09/24/2019     Priority: Medium     MALINA (obstructive sleep apnea) 09/10/2019     Priority: Medium     Intermittent asthma 03/01/2018     Priority: Medium     Overview:   Triggered by exercise, extreme heat/cold, illness       Morbid obesity (H) 03/01/2018     Priority: Medium     Fatty liver 05/21/2015     Priority: Medium     Moderate depressed bipolar I disorder (H) 07/02/2014     Priority: Medium     Post traumatic stress disorder (PTSD) 07/02/2014     Priority: Medium        Impression/Plan:    (G47.33) MALINA (obstructive sleep apnea)  (primary encounter diagnosis)  Comment: some snoring and snort awakenings. Download suggests apnea is well-controlled though. Machine has errors stating motor life has been exceeded and water chamber heater is not working. Machine is 7 years old.   Plan: Comprehensive DME        Order placed for a replacement auto CPAP 11-20 cm. A prescription was written for new supplies. We reviewed recommendations for cleaning and replacing supplies.       (F51.04) Chronic insomnia  Comment: CPAP download shows almost 10.5 hours of use per night. It takes her a couple of hours to fall asleep, likely because she is getting into bed too early.  Plan: 10:30 PM to 6 AM.      She will follow up with me in about 2 month(s).     23 minutes were spent  on the date of the encounter doing chart review, history and exam, documentation and further activities as noted above.     Bennett Goltz, PA-C    CC: No ref. provider found

## 2021-05-28 ENCOUNTER — VIRTUAL VISIT (OUTPATIENT)
Dept: SLEEP MEDICINE | Facility: CLINIC | Age: 31
End: 2021-05-28
Payer: COMMERCIAL

## 2021-05-28 DIAGNOSIS — G47.33 OSA (OBSTRUCTIVE SLEEP APNEA): Primary | ICD-10-CM

## 2021-05-28 DIAGNOSIS — F51.04 CHRONIC INSOMNIA: ICD-10-CM

## 2021-05-28 PROCEDURE — 99213 OFFICE O/P EST LOW 20 MIN: CPT | Mod: GT | Performed by: PHYSICIAN ASSISTANT

## 2021-05-28 NOTE — PATIENT INSTRUCTIONS
Limit time in bed to between 10:30 PM to 6 AM. Try to avoid naps and sleeping in if possible. If you must nap, try to keep it short and early in the afternoon.    General recommendations for sleep problems (Insomnia)  Allow 2-4 weeks to see results     Establish a regular sleep schedule    Most people only need 7-8 hours of sleep.  Don't be in bed longer than you need     to sleep or you will end up spending more time awake in bed. This trains your    brain to think of the bed as a place to not sleep.  Go to bed at same time each night   Get up at same time each day - Set an alarm everyday (even weekends). This is one of    the most important tips. It prevents you from relying on your insomnia to get you    up on time for your day. That actually reinforces insomnia. It also will help your    body get into a pattern where you start feeling tired at a consistent time each    night.  The body functions best when you keep a consistent routine.  Avoid sleeping-in and napping. Anytime you sleep during the day, you will be less tired at    night. You may be tired enough to fall asleep, but you will wake more in the    middle of the night because you will have met your sleep need before the night is    done.   Cut down time in bed (if not asleep, get up)- Use your bed only for sleep and sex    Anytime you spend time in bed doing activities other than sleep (reading,    watching TV, working, playing on the computer or phone, or even just laying in    bed trying to sleep), you are training  your brain to think of the bed as a place to    do activities other than sleep. If you are not falling asleep within 20-30 minutes,    get out of bed. While out of bed, avoid bright lights. Avoid work or chores. Being    productive in the middle of the night reinforces waking up at night. Find relaxing,    not particularly entertaining activities like reading, listening to music, or relaxation    exercises. Go back to bed if you start  feeling groggy, or after about 30 minutes,    even if not feeling very tired. Sometimes, just getting out of bed stops the pattern    of getting frustrated about laying in bed not sleeping, and that can help you fall    asleep.   Avoid trying to force yourself to sleep- sleep is not like everything else. The harder you    work at most things, the more you can accomplish. The harder you work at    sleep, the less you will sleep.     Make the bedroom comfortable - quiet, dark and cool are better. Consider ear plugs    (silicon). Use dark blinds or wear an eye mask if needed     Make a relaxing routine prior to bedtime  Relaxation exercises:   Progressive muscle relaxation: Relax each muscle group individually    Begin with your feet, flex, then relax. Try to imagine your feet feeling heavy and sinking into the bed. Move to your calves, do the same thing. Work through each muscle group toward your head.    Relaxing Mental Imagery: Try to imagine a trip that you took and found relaxing, or imagine a day at the beach. Try to walk yourself through the day in your mind as if you were dreaming it. Try to imagine sensing the different experiences, such as feeling sand between your toes, the heat of the sun on your skin, seeing the waves crashing the shore, the smell of the salt water, etc.     Deal with your worries before bedtime    Set aside a worry time around dinner time for 10-15 minutes. Write down the    things that are on your mind. Plan time in the coming days to address those    issues. Brainstorm ideas on how you will deal with them. Try to identify issues    that are out of your control, and try to let those issues go.  Listen to relaxation tapes   Classical Music or Nature sounds   Back Massage   Get regular exercise each day (at least 1-2 hours before bedtime)   Take medications only as directed   Eat a light bedtime snack or warm drink   Warm milk   Warm herbal tea (non-caffeinated)       Things to avoid   No  overstimulating activities just before bed   No competitive games before bedtime   No exciting television programs before bedtime   Avoid caffeine after lunchtime   Avoid chocolate   Do not use alcohol to induce sleep (worsens Insomnia)   Do not take someone else's sleeping pills   Do not look at the clock when awakening   Do not turn on light when getting up to use bathroom, use a nightlight     Online Programs     www.[employer] (pronounced shut eye). There is a fee for this program. Enter the code  [facility]  if you decide to enroll in this program.      www.sleepIO.com (pronounced sleep ee oh). There is a fee for this program. Enter the code  [facility]  if you decide to enroll in this program.     Suggested Resources  Insomnia Treatment Books     Overcoming Insomnia by Rick Berg and Chloé Platt (2008)    No More Sleepless Nights by Jeff Fowler and Marlena Cabral (1996)    Say Herminio to Insomnia by Pio Benavidez (2009)    The Insomnia Workbook by Abbey Hardy and Jakub Puentes (2009)    The Insomnia Answer by John Paul Mi and Saul Mcintyre (2006)      Stress Management and Relaxation Books    The Relaxation and Stress Reduction Workbook by Kate Lee, Landy Wright and Ray Vazquez (2008)    Stress Management Workbook: Techniques and Self-Assessment Procedures by Marilu Zheng and Boris Rojas (1997)    A Mindfulness-Based Stress Reduction Workbook by Pillo Romero and Roxy Spencer (2010)    The Complete Stress Management Workbook by Zackary Ford, Thang Duckworth and Salomon Doshi (1996)    Assert Yourself by Melodie Casas and Harrison Casas (1977)    Relaxation Resources for Computer Download   These websites offer resources to help you relax. This list is for information only. [facility] is not responsible for the quality of services or the actions of any person or organization.  Progressive Muscle Relaxation (PMR):      http://www.[employer]/progressive-muscle-relaxation-exercise.html     http://studentsupport.[facility]/counseling/resources/self-help/relaxation-and-stress-management/   Deep Breathing Exercises:    http://www.[employer]/breathing-awareness.html     Meditation:     www[employer]form[employer]    www.rankdeskguidedAdociameditation-site.LightTable You may have to pay for some of these resources.    Guided Imagery:    http://www.[employer]/guided-imagery-scripts.html     http://Musicmetric/library/nxdssrnfsa-isqpow-qocxleo/   Consider phone apps such as: Calm, Headspace or Insight Timer.    Counseling / Behavioral Health  [facility]  Visit www.[email or web address].org or call [phone] to find a clinic close to you.  Or call [phone] for [facility].    Your BMI is There is no height or weight on file to calculate BMI.  Weight management is a personal decision.  If you are interested in exploring weight loss strategies, the following discussion covers the approaches that may be successful. Body mass index (BMI) is one way to tell whether you are at a healthy weight, overweight, or obese. It measures your weight in relation to your height.  A BMI of 18.5 to 24.9 is in the healthy range. A person with a BMI of 25 to 29.9 is considered overweight, and someone with a BMI of 30 or greater is considered obese. More than two-thirds of American adults are considered overweight or obese.  Being overweight or obese increases the risk for further weight gain. Excess weight may lead to heart disease and diabetes.  Creating and following plans for healthy eating and physical activity may help you improve your health.  Weight control is part of healthy lifestyle and includes exercise, emotional health, and healthy eating habits. Careful eating habits lifelong are the mainstay of weight control. Though there are significant health benefits from weight loss,  long-term weight loss with diet alone may be very difficult to achieve- studies show long-term success with dietary management in less than 10% of people. Attaining a healthy weight may be especially difficult to achieve in those with severe obesity. In some cases, medications, devices and surgical management might be considered.  What can you do?  If you are overweight or obese and are interested in methods for weight loss, you should discuss this with your provider.     Consider reducing daily calorie intake by 500 calories.     Keep a food journal.     Avoiding skipping meals, consider cutting portions instead.    Diet combined with exercise helps maintain muscle while optimizing fat loss. Strength training is particularly important for building and maintaining muscle mass. Exercise helps reduce stress, increase energy, and improves fitness. Increasing exercise without diet control, however, may not burn enough calories to loose weight.       Start walking three days a week 10-20 minutes at a time    Work towards walking thirty minutes five days a week     Eventually, increase the speed of your walking for 1-2 minutes at time    In addition, we recommend that you review healthy lifestyles and methods for weight loss available through the National Institutes of Health patient information sites:  http://win.niddk.nih.gov/publications/index.htm    And look into health and wellness programs that may be available through your health insurance provider, employer, local community center, or cole club.    Weight management plan: Patient was referred to their PCP to discuss a diet and exercise plan.

## 2021-05-28 NOTE — PROGRESS NOTES
Olga is a 31 year old who is being evaluated via a billable video visit.      How would you like to obtain your AVS? MyChart  If the video visit is dropped, the invitation should be resent by: Text to cell phone: [phone]  Will anyone else be joining your video visit? Aria Colin MA on 5/28/2021 at 10:07 AM    Video Start Time: 2:14 PM  Video-Visit Details    Type of service:  Video Visit    Video End Time:2:32 PM    Originating Location (pt. Location): Home    Distant Location (provider location):  [facility]     Platform used for Video Visit: [employer] Interactive

## 2021-06-01 ENCOUNTER — TELEPHONE (OUTPATIENT)
Dept: SLEEP MEDICINE | Facility: CLINIC | Age: 31
End: 2021-06-01

## 2021-06-01 NOTE — TELEPHONE ENCOUNTER
Patient called my direct line back to schedule replacement cpap setup. She scheduled for 6/4/2021 at 1pm in [place] showroom. Confirmed address, date, and time of appointment with patient.

## 2021-06-01 NOTE — NURSING NOTE
DME order to [facility].  Will send patient a [employer] message to follow up in 2 months.

## 2021-06-01 NOTE — TELEPHONE ENCOUNTER
I called patient to schedule replacement cpap appointment with [facility], left voicemail for her to call me back at my direct line to schedule.

## 2021-06-04 ENCOUNTER — DOCUMENTATION ONLY (OUTPATIENT)
Dept: SLEEP MEDICINE | Facility: CLINIC | Age: 31
End: 2021-06-04
Payer: COMMERCIAL

## 2021-06-04 NOTE — PROGRESS NOTES
Patient was offered choice of vendor and chose [facility].  Patient Olga Sheehan was set up at [place] on June 4, 2021. Patient received a [employer] DreamStation Auto. Pressures were set at 11-20 cm H2O.   Patient s ramp is 5 cm H2O for Off and FLEX/EPR is 2.  Patient received a Resmed Mask name: AirFit N20  Nasal mask size Medium, heated tubing and heated humidifier.  Patient does need to meet compliance. Patient has a follow up on TBD (7/5/21 - 9/1/21) with Bennett Goltz, PA-C. Nikki Hagan

## 2021-06-07 ENCOUNTER — DOCUMENTATION ONLY (OUTPATIENT)
Dept: SLEEP MEDICINE | Facility: CLINIC | Age: 31
End: 2021-06-07
Payer: COMMERCIAL

## 2021-06-21 ENCOUNTER — DOCUMENTATION ONLY (OUTPATIENT)
Dept: SLEEP MEDICINE | Facility: CLINIC | Age: 31
End: 2021-06-21
Payer: COMMERCIAL

## 2021-07-05 ENCOUNTER — DOCUMENTATION ONLY (OUTPATIENT)
Dept: SLEEP MEDICINE | Facility: CLINIC | Age: 31
End: 2021-07-05

## 2021-07-05 NOTE — PROGRESS NOTES
30 DAY [facility] VISIT      Message left for patient to return call     Assessment: Pt meeting objective benchmarks.     Action plan: waiting for patient to return call.   Patient has not scheduled a follow up visit.  Device type: Auto-CPAP  PAP settings: CPAP min 12.0 cm  H20     CPAP max 20.0 cm  H20      90th % pressure 13.1  cm  H20    Mask type:  Nasal Mask    Objective measures: 14 day rolling measures         Compliance  100 %     % of night spent in large leak  0 % last  upload      AHI 1.6   last  upload           Objective measure goal  Compliance   Goal >70%  Leak   Goal < 10%  AHI  Goal < 5  Usage  Goal >240      Total time spent on accessing and interpreting remote patient PAP therapy data  2 minutes    Total time spent counseling, coaching  and reviewing PAP therapy data with patient  0 minutes    [id number] no this call  [id number]  at 3 or 14 day [facility]

## 2021-09-17 ASSESSMENT — SLEEP AND FATIGUE QUESTIONNAIRES
HOW LIKELY ARE YOU TO NOD OFF OR FALL ASLEEP WHILE SITTING QUIETLY AFTER LUNCH WITHOUT ALCOHOL: WOULD NEVER DOZE
HOW LIKELY ARE YOU TO NOD OFF OR FALL ASLEEP WHILE SITTING AND READING: WOULD NEVER DOZE
HOW LIKELY ARE YOU TO NOD OFF OR FALL ASLEEP WHILE SITTING AND TALKING TO SOMEONE: WOULD NEVER DOZE
HOW LIKELY ARE YOU TO NOD OFF OR FALL ASLEEP WHILE WATCHING TV: SLIGHT CHANCE OF DOZING
HOW LIKELY ARE YOU TO NOD OFF OR FALL ASLEEP WHILE SITTING INACTIVE IN A PUBLIC PLACE: WOULD NEVER DOZE
HOW LIKELY ARE YOU TO NOD OFF OR FALL ASLEEP WHEN YOU ARE A PASSENGER IN A CAR FOR AN HOUR WITHOUT A BREAK: SLIGHT CHANCE OF DOZING
HOW LIKELY ARE YOU TO NOD OFF OR FALL ASLEEP WHILE LYING DOWN TO REST IN THE AFTERNOON WHEN CIRCUMSTANCES PERMIT: SLIGHT CHANCE OF DOZING
HOW LIKELY ARE YOU TO NOD OFF OR FALL ASLEEP IN A CAR, WHILE STOPPED FOR A FEW MINUTES IN TRAFFIC: WOULD NEVER DOZE

## 2021-09-18 ENCOUNTER — HEALTH MAINTENANCE LETTER (OUTPATIENT)
Age: 31
End: 2021-09-18

## 2021-09-23 ENCOUNTER — VIRTUAL VISIT (OUTPATIENT)
Dept: SLEEP MEDICINE | Facility: CLINIC | Age: 31
End: 2021-09-23
Payer: COMMERCIAL

## 2021-09-23 DIAGNOSIS — G47.33 OSA (OBSTRUCTIVE SLEEP APNEA): Primary | ICD-10-CM

## 2021-09-23 DIAGNOSIS — F51.04 CHRONIC INSOMNIA: ICD-10-CM

## 2021-09-23 PROCEDURE — 99214 OFFICE O/P EST MOD 30 MIN: CPT | Mod: GT | Performed by: PHYSICIAN ASSISTANT

## 2021-09-23 NOTE — PATIENT INSTRUCTIONS
Instructions for treating Delayed Sleep Phase Syndrome:    Delayed Sleep Phase Syndrome (DSPS) means that your body's internal timing is set late compared to the 24 hour day. Therefore, it is often difficult to get up on time for work in the morning and sometimes difficult to fall asleep on time, in order to get enough sleep. People with DSPS often tend to like to stay up late on weekends and sleep in until between 10 AM and noon, sometimes even later.This is actually a bad habit that will perpetuate the problem. It reinforces your body's tendency to be on that later schedule.    You should go to bed when you are sleepy and ready to sleep. During this entire process, you should not engage in activities that may make it worse, such as watching TV in bed, leaving the TV on all night, drinking any caffeine 6 hours before bed or exercising 1-2 hours before bed.     Start taking Melatonin, 1 mg tablet 3-5 hours before the time that you fall asleep on average (not your desired bedtime or time that you get in bed, but the time you normally fall asleep on your own).     Upon awakening, get exposure to sun-light for about 30-45 minutes. You do not need to look at the sun, in fact, this is dangerous. Reading the paper with the sun shining on you is adequate.  Alternatively, you may use a Seasonal Affective Disorder Lamp (intensity 10,000 Lux) instead of the sun. The lamp should be positioned 1-2 arms lengths away from you. They lamps are sold at Home Medical Companies such as [employer] or [employer]. A prescription can be written to get insurance coverage in some cases. They are also sold on Amazon.com.    Using the light and melatonin should help march your internal clock (known as your circadian rhythms) gradually earlier. As your bedtime advances, remember to take your melatonin earlier, keeping it 5 hours before your fall asleep time.    Avoid naps and sleeping in because sleeping during the day will delay  your body's clock and you will have to start from scratch.     More information about light therapy:  If the cost of any light box is too much, you can also purchase a compact fluorescent all spectrum light bulb at a local hardware store for about $8.  The most commonly available bulb is 1400 lumen.  You would need two of these positioned within 1 meter of yourself to be equivalent to 2,500 lux.  The bulbs can be placed in a standard light fixture.  Additionally, they can be placed in a mountable fixture that is used in greenhouses.  Mountable fixtures are also available at hardware stores for about $9.  Do not look directly at the light.  If you have any concerns regarding the safety of bright light therapy for you, it is recommended that you consult an ophthalmologist before using a light box.  If you have a condition that makes your eyes very sensitive to light, macular degeneration, a family history of such problems, or diabetic changes to your eyes, consult an ophthalmologist before using a light box. If you have anxiety disorder and have an increase in anxiety discontinue use.     Your BMI is There is no height or weight on file to calculate BMI.  Weight management is a personal decision.  If you are interested in exploring weight loss strategies, the following discussion covers the approaches that may be successful. Body mass index (BMI) is one way to tell whether you are at a healthy weight, overweight, or obese. It measures your weight in relation to your height.  A BMI of 18.5 to 24.9 is in the healthy range. A person with a BMI of 25 to 29.9 is considered overweight, and someone with a BMI of 30 or greater is considered obese. More than two-thirds of American adults are considered overweight or obese.  Being overweight or obese increases the risk for further weight gain. Excess weight may lead to heart disease and diabetes.  Creating and following plans for healthy eating and physical activity may help  you improve your health.  Weight control is part of healthy lifestyle and includes exercise, emotional health, and healthy eating habits. Careful eating habits lifelong are the mainstay of weight control. Though there are significant health benefits from weight loss, long-term weight loss with diet alone may be very difficult to achieve- studies show long-term success with dietary management in less than 10% of people. Attaining a healthy weight may be especially difficult to achieve in those with severe obesity. In some cases, medications, devices and surgical management might be considered.  What can you do?  If you are overweight or obese and are interested in methods for weight loss, you should discuss this with your provider.     Consider reducing daily calorie intake by 500 calories.     Keep a food journal.     Avoiding skipping meals, consider cutting portions instead.    Diet combined with exercise helps maintain muscle while optimizing fat loss. Strength training is particularly important for building and maintaining muscle mass. Exercise helps reduce stress, increase energy, and improves fitness. Increasing exercise without diet control, however, may not burn enough calories to loose weight.       Start walking three days a week 10-20 minutes at a time    Work towards walking thirty minutes five days a week     Eventually, increase the speed of your walking for 1-2 minutes at time    In addition, we recommend that you review healthy lifestyles and methods for weight loss available through the National Institutes of Health patient information sites:  http://win.niddk.nih.gov/publications/index.htm    And look into health and wellness programs that may be available through your health insurance provider, employer, local community center, or cole club.    Weight management plan: Patient was referred to their PCP to discuss a diet and exercise plan.

## 2021-09-23 NOTE — PROGRESS NOTES
Olga is a 31 year old who is being evaluated via a billable video visit.      How would you like to obtain your AVS? MyChart  If the video visit is dropped, the invitation should be resent by: Text to cell phone: [phone]  Will anyone else be joining your video visit? No      Video Start Time: 2:32 PM  Video-Visit Details    Type of service:  Video Visit    Video End Time:3:03 PM    Originating Location (pt. Location): Home    Distant Location (provider location):  [facility]     Platform used for Video Visit: [employer]    CPAP Follow-Up Visit:    Date on this visit: 9/23/2021    Olga Sheehan has a follow-up visit today to review her CPAP use for MALINA and management of insomnia. Her medical history is significant for intermittent asthma, bipolar I depression, PTSD and obesity.      She had a home sleep study through [facility] on 6/4/2015. Her MARY was 17.8/hr, O2 debbie 79%. Her events occurred independent of position. Her weight was 365 at the time of her test.    At her last visit, a replacement CPAP was prescribed as her prior machine had a message that the expected motor life had been exceeded. She likes the new machine and mask. It fits better.     At the last visit, we had also discussed sleep restriction, aiming for time in bed between 10:30 PM and 6 AM, to treat insomnia.    PAP machine: [employer] Dreamstation 2. Pressure settings: 11-20 cm    The compliance data shows that the patient used the CPAP for 100% of nights for >4 hours.  The 90th% pressure is 12.2 cm.  The average time in large leak is 0.  The average nightly usage is 661 min.  The average AHI is 1.7/hr.       Interface:  Mask: N20 mask  Chin strap: No  Leak: Not with the mask. Sometimes the hose comes detached when she rolls.   Using Humidifier: Yes, sometimes runs out of water.  Condensation in hose or mask: No     Difficulties with therapy:    [-] Snoring with CPAP:  [-] Difficulty tolerating the pressure:  [-]  Epistaxis/dry nose:   [-] Nasal congestion:  [-] Dry mouth:  [-] Mouth breathing:   [-] Pain/skin breakdown:      Improvements noted with CPAP:   [+] waking up more refreshed  [+] sleeping better with less arousals  [+] improved energy level during the day      She tried compressing her time in bed, 10 PM to 5 AM. On Monday and Tuesdays (has to be up at 5:15 AM), she gets up early for work. On weds-fri (she has to be up by 6:30 AM), she was having problems getting up on time for work.   Now, she usually goes to bed 9-9:30 PM, sometimes 10 PM.   Falls asleep in 60 minutes. She wakes a couple times and gets back to sleep in 10-15 minutes.  On days off, she goes to bed around midnight and is up 8-9 AM.  No naps.    She has cut her caffeine so she can get to bed a little more quickly.   She has a SAD lamp. She as only using it in the winter.     She only takes olanzapine when manic. She takes hydroxyzine rarely. It did not make her very sleepy.     Past medical/surgical history, family history, social history, medications and allergies were reviewed.      Problem List:  Patient Active Problem List    Diagnosis Date Noted     Cholelithiasis 10/08/2019     Priority: Medium     Added automatically from request for surgery [id number]       Bipolar affective disorder, currently manic, mild (H) 09/24/2019     Priority: Medium     Encounter for therapeutic drug monitoring 09/24/2019     Priority: Medium     MALINA (obstructive sleep apnea) 09/10/2019     Priority: Medium     Intermittent asthma 03/01/2018     Priority: Medium     Overview:   Triggered by exercise, extreme heat/cold, illness       Morbid obesity (H) 03/01/2018     Priority: Medium     Fatty liver 05/21/2015     Priority: Medium     Moderate depressed bipolar I disorder (H) 07/02/2014     Priority: Medium     Post traumatic stress disorder (PTSD) 07/02/2014     Priority: Medium        Impression/Plan:    (G47.33) MALINA (obstructive sleep apnea)  (primary encounter  diagnosis)  Comment: Olga is doing well with her new CPAP. Her usage is good and the apnea is well-controlled based on the download. The only minor issue is that the hose sometimes detaches from the mask when she rolls around in her sleep.  Plan: Continue auto CPAP 11-20 cm. She was shown some devices online that are designed to help keep the hose out of the way.    (F51.04) Chronic insomnia  Comment: Olga tried compressing her sleep by going to bed later, but then had some trouble getting up on time towards the end of the week, likely due to accumulated sleep deprivation. She continues to have difficulty falling asleep by 10-10:30 PM and getting up by 5-6 AM. On weekends, she sleeps 12-8 or 9 AM. I think her issue with sleep is more due to a social jet lag.  Plan: We discussed getting 30 minutes of bright light exposure in the morning, either with the sun or a SAD Lamp of 10,000 lux intensity. Avoid bright light, including electronics, in the hour before bedtime. Take 1 mg melatonin 3-5 hours prior to natural sleep onset. Keep a consistent sleep schedule, avoiding naps and sleeping in.    We talked about seeing if her work would allow a later start time. She did not show much interest in that option.     She will follow up with me in about 2 month(s).     37 minutes were spent on the date of the encounter doing chart review, history and exam, documentation and further activities as noted above.     Bennett Goltz, PA-C    CC: No ref. provider found

## 2021-09-27 NOTE — NURSING NOTE
Attempted;unsuccessful left message to contact Sleep clinic at [phone] to schedule follow up visit.         Yadira Niño HENRY  [facility] [place]

## 2021-11-08 ENCOUNTER — APPOINTMENT (OUTPATIENT)
Dept: CT IMAGING | Facility: CLINIC | Age: 31
End: 2021-11-08
Attending: EMERGENCY MEDICINE
Payer: COMMERCIAL

## 2021-11-08 ENCOUNTER — HOSPITAL ENCOUNTER (EMERGENCY)
Facility: CLINIC | Age: 31
Discharge: HOME | End: 2021-11-08
Attending: EMERGENCY MEDICINE | Admitting: EMERGENCY MEDICINE
Payer: COMMERCIAL

## 2021-11-08 VITALS
RESPIRATION RATE: 22 BRPM | OXYGEN SATURATION: 100 % | DIASTOLIC BLOOD PRESSURE: 89 MMHG | HEART RATE: 68 BPM | TEMPERATURE: 97.6 F | SYSTOLIC BLOOD PRESSURE: 146 MMHG

## 2021-11-08 DIAGNOSIS — R10.31 ABDOMINAL PAIN, RIGHT LOWER QUADRANT: ICD-10-CM

## 2021-11-08 LAB
ALBUMIN UR-MCNC: NEGATIVE MG/DL
ANION GAP SERPL CALCULATED.3IONS-SCNC: 6 MMOL/L (ref 3–14)
APPEARANCE UR: CLEAR
B-HCG FREE SERPL-ACNC: <5 IU/L (ref 0–5)
BASOPHILS # BLD AUTO: 0 10E3/UL (ref 0–0.2)
BASOPHILS NFR BLD AUTO: 0 %
BILIRUB UR QL STRIP: NEGATIVE
BUN SERPL-MCNC: 9 MG/DL (ref 7–30)
CALCIUM SERPL-MCNC: 8.5 MG/DL (ref 8.5–10.1)
CHLORIDE BLD-SCNC: 109 MMOL/L (ref 94–109)
CO2 SERPL-SCNC: 25 MMOL/L (ref 20–32)
COLOR UR AUTO: NORMAL
CREAT SERPL-MCNC: 0.9 MG/DL (ref 0.52–1.04)
EOSINOPHIL # BLD AUTO: 0 10E3/UL (ref 0–0.7)
EOSINOPHIL NFR BLD AUTO: 0 %
ERYTHROCYTE [DISTWIDTH] IN BLOOD BY AUTOMATED COUNT: 13.4 % (ref 10–15)
GFR SERPL CREATININE-BSD FRML MDRD: 84 ML/MIN/1.73M2
GLUCOSE BLD-MCNC: 107 MG/DL (ref 70–99)
GLUCOSE UR STRIP-MCNC: NEGATIVE MG/DL
HCT VFR BLD AUTO: 42.1 % (ref 35–47)
HGB BLD-MCNC: 13.3 G/DL (ref 11.7–15.7)
HGB UR QL STRIP: NEGATIVE
HOLD SPECIMEN: NORMAL
HOLD SPECIMEN: NORMAL
IMM GRANULOCYTES # BLD: 0.1 10E3/UL
IMM GRANULOCYTES NFR BLD: 1 %
KETONES UR STRIP-MCNC: NEGATIVE MG/DL
LEUKOCYTE ESTERASE UR QL STRIP: NEGATIVE
LYMPHOCYTES # BLD AUTO: 1.8 10E3/UL (ref 0.8–5.3)
LYMPHOCYTES NFR BLD AUTO: 21 %
MCH RBC QN AUTO: 29.6 PG (ref 26.5–33)
MCHC RBC AUTO-ENTMCNC: 31.6 G/DL (ref 31.5–36.5)
MCV RBC AUTO: 94 FL (ref 78–100)
MONOCYTES # BLD AUTO: 0.6 10E3/UL (ref 0–1.3)
MONOCYTES NFR BLD AUTO: 7 %
NEUTROPHILS # BLD AUTO: 6 10E3/UL (ref 1.6–8.3)
NEUTROPHILS NFR BLD AUTO: 71 %
NITRATE UR QL: NEGATIVE
NRBC # BLD AUTO: 0 10E3/UL
NRBC BLD AUTO-RTO: 0 /100
PH UR STRIP: 6.5 [PH] (ref 5–7)
PLATELET # BLD AUTO: 309 10E3/UL (ref 150–450)
POTASSIUM BLD-SCNC: 4 MMOL/L (ref 3.4–5.3)
RBC # BLD AUTO: 4.49 10E6/UL (ref 3.8–5.2)
RBC URINE: 1 /HPF
SODIUM SERPL-SCNC: 140 MMOL/L (ref 133–144)
SP GR UR STRIP: 1.01 (ref 1–1.03)
SQUAMOUS EPITHELIAL: <1 /HPF
UROBILINOGEN UR STRIP-MCNC: NORMAL MG/DL
WBC # BLD AUTO: 8.5 10E3/UL (ref 4–11)
WBC URINE: 1 /HPF

## 2021-11-08 PROCEDURE — 250N000009 HC RX 250: Performed by: EMERGENCY MEDICINE

## 2021-11-08 PROCEDURE — 96374 THER/PROPH/DIAG INJ IV PUSH: CPT | Mod: 59

## 2021-11-08 PROCEDURE — 74177 CT ABD & PELVIS W/CONTRAST: CPT

## 2021-11-08 PROCEDURE — 36415 COLL VENOUS BLD VENIPUNCTURE: CPT | Performed by: EMERGENCY MEDICINE

## 2021-11-08 PROCEDURE — 84702 CHORIONIC GONADOTROPIN TEST: CPT

## 2021-11-08 PROCEDURE — 99285 EMERGENCY DEPT VISIT HI MDM: CPT | Mod: 25

## 2021-11-08 PROCEDURE — 250N000011 HC RX IP 250 OP 636: Performed by: EMERGENCY MEDICINE

## 2021-11-08 PROCEDURE — 80048 BASIC METABOLIC PNL TOTAL CA: CPT | Performed by: EMERGENCY MEDICINE

## 2021-11-08 PROCEDURE — 81001 URINALYSIS AUTO W/SCOPE: CPT | Performed by: EMERGENCY MEDICINE

## 2021-11-08 PROCEDURE — 85025 COMPLETE CBC W/AUTO DIFF WBC: CPT | Performed by: EMERGENCY MEDICINE

## 2021-11-08 PROCEDURE — 96376 TX/PRO/DX INJ SAME DRUG ADON: CPT

## 2021-11-08 RX ORDER — HYDROMORPHONE HYDROCHLORIDE 1 MG/ML
0.5 INJECTION, SOLUTION INTRAMUSCULAR; INTRAVENOUS; SUBCUTANEOUS
Status: DISCONTINUED | OUTPATIENT
Start: 2021-11-08 | End: 2021-11-08 | Stop reason: HOSPADM

## 2021-11-08 RX ORDER — IOPAMIDOL 755 MG/ML
500 INJECTION, SOLUTION INTRAVASCULAR ONCE
Status: COMPLETED | OUTPATIENT
Start: 2021-11-08 | End: 2021-11-08

## 2021-11-08 RX ORDER — DICYCLOMINE HCL 20 MG
20 TABLET ORAL 4 TIMES DAILY PRN
Qty: 20 TABLET | Refills: 0 | Status: SHIPPED | OUTPATIENT
Start: 2021-11-08 | End: 2022-06-02

## 2021-11-08 RX ORDER — ONDANSETRON 4 MG/1
4 TABLET, ORALLY DISINTEGRATING ORAL EVERY 6 HOURS PRN
Qty: 10 TABLET | Refills: 0 | Status: SHIPPED | OUTPATIENT
Start: 2021-11-08 | End: 2021-11-12

## 2021-11-08 RX ADMIN — HYDROMORPHONE HYDROCHLORIDE 0.5 MG: 1 INJECTION, SOLUTION INTRAMUSCULAR; INTRAVENOUS; SUBCUTANEOUS at 12:07

## 2021-11-08 RX ADMIN — SODIUM CHLORIDE 65 ML: 9 INJECTION, SOLUTION INTRAVENOUS at 12:37

## 2021-11-08 RX ADMIN — HYDROMORPHONE HYDROCHLORIDE 0.5 MG: 1 INJECTION, SOLUTION INTRAMUSCULAR; INTRAVENOUS; SUBCUTANEOUS at 14:01

## 2021-11-08 RX ADMIN — IOPAMIDOL 100 ML: 755 INJECTION, SOLUTION INTRAVENOUS at 12:37

## 2021-11-08 ASSESSMENT — ENCOUNTER SYMPTOMS
DIFFICULTY URINATING: 1
SHORTNESS OF BREATH: 0
FREQUENCY: 0
DIAPHORESIS: 1
CHILLS: 1
ABDOMINAL PAIN: 1
FEVER: 1
HEMATURIA: 0
CONSTIPATION: 1
NAUSEA: 1

## 2021-11-08 NOTE — ED PROVIDER NOTES
History   Chief Complaint:  Abdominal Pain       HPI     Olga Sheehan is a 31 year old female with history of cholelithiasis s/p cholecystectomy on 10/10/19 who presents with abdominal pain. The patient reports intermittent sharp RLQ abdominal pain. The pain has been present over the last few weeks and has never completely gone away. Moving exacerbates her pain. She has also had a difficult time urinating and passing a bowel movement. Her last bowel movement was today. When prompted, she has been nauseous, felt warm to touch, and has had the cold sweats. Denies any hematuria, urinary frequency, or shortness of breath.       Review of Systems   Constitutional: Positive for chills, diaphoresis and fever.   Respiratory: Negative for shortness of breath.    Gastrointestinal: Positive for abdominal pain, constipation and nausea.   Genitourinary: Positive for difficulty urinating. Negative for frequency and hematuria.   All other systems reviewed and are negative.    Allergies:  Cats  Ibuprofen  Lamotrigine  Ceclor  Penicillins    Medications:  Tegretol  Flexeril  Vistaril  Melatonin  Zyprexa    Past Medical History:     Anxiety  Depression  Asthma  Bipolar affective disorder  Obesity  MALINA  Fatty liver  Cholelithiasis  PTSD    Past Surgical History:    [place] teeth extraction  Gallbladder removal  Rectal exam under anesthesia  Incision and drainage of rectum  Laparoscopic cholecystectomy  Placement of rectum seton  Rectum lift procedure     Family History:    Father: Sleep apnea, drug abuse, bipolar disorder, depression  Sister: Anxiety disorder, depression, anorexia nervosa, migraines    Social History:  Presents to the emergency department alone.    Physical Exam     Patient Vitals for the past 24 hrs:   BP Temp Pulse Resp SpO2   11/08/21 1300 (!) 150/82 -- 68 -- 99 %   11/08/21 1230 -- -- -- -- 100 %   11/08/21 1215 -- -- -- -- 100 %   11/08/21 1023 (!) 153/106 97.6  F (36.4  C) 80 22 99 %       Physical  Exam      Eyes:    Conjunctiva normal  Neck:    Supple, no meningismus.     CV:     Regular rate and rhythm.      No murmurs, rubs or gallops.     No lower extremity edema.  PULM:    Clear to auscultation bilateral.       No respiratory distress.      Good air exchange.     No rales or wheezing.  ABD:    Soft, non-distended.       Mild-moderate tenderness in the RLQ inferior to McBurney's point      Negative Rovsing's sign.     Bowel sounds normal.     No pulsatile masses.       No rebound, guarding or rigidity.     No CVA tenderness.   MSK:     No gross deformity to all four extremities.   LYMPH:   No cervical lymphadenopathy.  NEURO:   Alert.  Good muscular tone, no atrophy.   Skin:    Warm, dry and intact.    Psych:    Mood is good and affect is appropriate.      Emergency Department Course     Imaging:  CT Abdomen Pelvis w Contrast  1.  No acute abnormality is seen. Normal appendix.  2.  Fatty liver.  Report per radiology    Laboratory:  CBC: WBC 8.5, HGB 13.3,    BMP: glucose 107 (H), o/w WNL (Creatinine 0.9)    ISTAT HCG Quantitative: <5.0  UA with micro: negative    Emergency Department Course:  Reviewed:  I reviewed nursing notes, vitals, past medical history and Care Everywhere    Assessments:  1115 I obtained history and examined the patient as noted above.      1347 I rechecked the patient and explained findings.     Interventions:  1207 Dilaudid 0.5 mg IV     Disposition:  The patient was discharged to home.     Impression & Plan     Medical Decision Making:    Olga Sheehan is a 31 year old female presents with intermittent right lower quadrant pain.  Basic laboratory studies are unremarkable.  No evidence of urinary tract infection.  No historical features to draw concern for vaginitis, cervicitis or PID.  CT scan of the abdomen pelvis unremarkable.  There is no ovarian pathology to warrant ultrasound at this time.  Historical information is not consistent with ovarian torsion especially in the  absence of right ovarian mass or lesion, no indication for Doppler ultrasound.  Differential diagnosis would include enteritis, colitis, IBS, IBD, endometriosis, mittelschmerz, abdominal wall strain.  Patient safe for discharge home with supportive measures and close follow-up with PCP.      Diagnosis:    ICD-10-CM    1. Abdominal pain, right lower quadrant  R10.31        Discharge Medications:  New Prescriptions    DICYCLOMINE (BENTYL) 20 MG TABLET    Take 1 tablet (20 mg) by mouth 4 times daily as needed (abdominal pain)    ONDANSETRON (ZOFRAN ODT) 4 MG ODT TAB    Take 1 tablet (4 mg) by mouth every 6 hours as needed for nausea       Scribe Disclosure:  I, Clarence Stevenson, am serving as a scribe at 11:28 AM on 11/8/2021 to document services personally performed by Adams Billings MD based on my observations and the provider's statements to me.     Scribe Disclosure:  I, Jia Galeas, am serving as a scribe at 11:52 AM on 11/8/2021 to document services personally performed by Adams Billings MD based on my observations and the provider's statements to me.             Adams Billings MD  11/08/21 1373

## 2021-11-08 NOTE — ED TRIAGE NOTES
Patient presents to the ED reporting RLQ abdominal pain that began last night. States is worse with muscle movement.

## 2021-11-12 ENCOUNTER — VIRTUAL VISIT (OUTPATIENT)
Dept: INTERNAL MEDICINE | Facility: CLINIC | Age: 31
End: 2021-11-12
Payer: COMMERCIAL

## 2021-11-12 DIAGNOSIS — Z84.2 FAMILY HISTORY OF OVARIAN CYST: ICD-10-CM

## 2021-11-12 DIAGNOSIS — R10.2 PELVIC PAIN IN FEMALE: Primary | ICD-10-CM

## 2021-11-12 PROCEDURE — 99214 OFFICE O/P EST MOD 30 MIN: CPT | Mod: GT | Performed by: NURSE PRACTITIONER

## 2021-11-12 NOTE — PROGRESS NOTES
Olga is a 31 year old who is being evaluated via a billable video visit.      How would you like to obtain your AVS? Howard  If the video visit is dropped, the invitation should be resent by: howard  Will anyone else be joining your video visit? No    Video Start Time: 1310    Assessment & Plan     Pelvic pain in female  Needs assessment   - US Pelvic Complete with Transvaginal; Future  - Ob/Gyn Referral; Future    Family history of ovarian cyst  Mom also had a molar pregnancy   - US Pelvic Complete with Transvaginal; Future  - Ob/Gyn Referral; Future      27 minutes spent on the date of the encounter doing chart review, history and exam, documentation and further activities per the note       Patient Instructions     Pelvic ultrasound   They will call you to set up     Ob gyn referral   [facility]   [address] [place]   [address]   [facility] [address]   Phone: [phone]   Fax: [phone]           Return in about 1 year (around 11/12/2022).    NIALL Bartlett [facility]   Olga is a 31 year old who presents for the following health issues     HPI   Chief Complaint   Patient presents with     Ovarian Cyst     pt was in the ED has ovarian cysts and needs referral for OB /GYN.     Nothing seen on ct scan   Mother and grandma with ovarian cysts,  and molar pregnancy in mom   ER suggested she have ultrasound and see ob gyn       Review of Systems   Constitutional, HEENT, cardiovascular, pulmonary, GI, , musculoskeletal, neuro, skin, endocrine and psych systems are negative, except as otherwise noted.      Objective           Vitals:  No vitals were obtained today due to virtual visit.    Physical Exam   GENERAL:  alert and no distress  EYES: Eyes grossly normal to inspection.  No discharge or erythema, or obvious scleral/conjunctival abnormalities.  RESP: No audible wheeze, cough, or visible cyanosis.  No visible retractions or increased work of  breathing.    SKIN: Visible skin clear. No significant rash, abnormal pigmentation or lesions.  NEURO: Cranial nerves grossly intact.  Mentation and speech appropriate for age.  PSYCH: Mentation appears normal, affect normal/bright, judgement and insight intact, normal speech and appearance well-groomed.    Pelvic ultrasound  Reviewed ER notes             Video-Visit Details    Type of service:  Video Visit    Video End Time:1:37 PM    Originating Location (pt. Location): Home    Distant Location (provider location):  [facility]     Platform used for Video Visit: TruckTrack

## 2021-11-12 NOTE — PATIENT INSTRUCTIONS
Pelvic ultrasound   They will call you to set up     Ob gyn referral   [facility]   [address]   [address]   [facility] [address]   Phone: [phone]   Fax: [phone]

## 2021-11-12 NOTE — NURSING NOTE
"Chief Complaint   Patient presents with     Ovarian Cyst     pt was in the ED has ovarian cysts and needs referral for OB /GYN.     initial LMP 11/12/2021 (Exact Date)   Breastfeeding No  Estimated body mass index is 57.17 kg/m  as calculated from the following:    Height as of 4/15/21: 1.702 m (5' 7\").    Weight as of 4/15/21: 165.6 kg (365 lb)..  bp completed using cuff size NA (Not Taken)  COLIN MORROW LPN  "

## 2021-11-13 ASSESSMENT — ASTHMA QUESTIONNAIRES: ACT_TOTALSCORE: 25

## 2021-11-18 ENCOUNTER — ANCILLARY PROCEDURE (OUTPATIENT)
Dept: ULTRASOUND IMAGING | Facility: CLINIC | Age: 31
End: 2021-11-18
Attending: NURSE PRACTITIONER
Payer: COMMERCIAL

## 2021-11-18 DIAGNOSIS — Z84.2 FAMILY HISTORY OF OVARIAN CYST: ICD-10-CM

## 2021-11-18 DIAGNOSIS — R10.2 PELVIC PAIN IN FEMALE: ICD-10-CM

## 2021-11-18 PROCEDURE — 76830 TRANSVAGINAL US NON-OB: CPT | Performed by: OBSTETRICS & GYNECOLOGY

## 2021-11-18 PROCEDURE — 76856 US EXAM PELVIC COMPLETE: CPT | Performed by: OBSTETRICS & GYNECOLOGY

## 2022-04-30 ENCOUNTER — HEALTH MAINTENANCE LETTER (OUTPATIENT)
Age: 32
End: 2022-04-30

## 2022-05-31 ENCOUNTER — E-VISIT (OUTPATIENT)
Dept: INTERNAL MEDICINE | Facility: CLINIC | Age: 32
End: 2022-05-31
Payer: COMMERCIAL

## 2022-05-31 DIAGNOSIS — M54.6 ACUTE MIDLINE THORACIC BACK PAIN: ICD-10-CM

## 2022-05-31 PROCEDURE — 99421 OL DIG E/M SVC 5-10 MIN: CPT | Performed by: NURSE PRACTITIONER

## 2022-05-31 RX ORDER — CYCLOBENZAPRINE HCL 10 MG
10 TABLET ORAL 3 TIMES DAILY PRN
Qty: 60 TABLET | Refills: 1 | Status: SHIPPED | OUTPATIENT
Start: 2022-05-31 | End: 2023-10-04

## 2022-05-31 NOTE — PATIENT INSTRUCTIONS
Thank you for choosing us for your care. I have placed an order for a prescription so that you can start treatment. View your full visit summary for details by clicking on the link below. Your pharmacist will able to address any questions you may have about the medication.      If you're not feeling better within 2-3 weeks please schedule an appointment.  You can schedule an appointment right here in [facility], or call [phone]  If the visit is for the same symptoms as your eVisit, we'll refund the cost of your eVisit if seen within seven days.

## 2022-06-02 ENCOUNTER — VIRTUAL VISIT (OUTPATIENT)
Dept: FAMILY MEDICINE | Facility: CLINIC | Age: 32
End: 2022-06-02
Payer: COMMERCIAL

## 2022-06-02 DIAGNOSIS — J45.20 MILD INTERMITTENT ASTHMA WITHOUT COMPLICATION: ICD-10-CM

## 2022-06-02 DIAGNOSIS — U07.1 INFECTION DUE TO 2019 NOVEL CORONAVIRUS: Primary | ICD-10-CM

## 2022-06-02 PROCEDURE — 99213 OFFICE O/P EST LOW 20 MIN: CPT | Mod: GT | Performed by: PHYSICIAN ASSISTANT

## 2022-06-02 RX ORDER — LURASIDONE HYDROCHLORIDE 20 MG/1
20 TABLET, FILM COATED ORAL DAILY
COMMUNITY
Start: 2022-05-10 | End: 2024-05-08

## 2022-06-02 RX ORDER — ALBUTEROL SULFATE 90 UG/1
2 AEROSOL, METERED RESPIRATORY (INHALATION) EVERY 6 HOURS
Qty: 18 G | Refills: 4 | Status: SHIPPED | OUTPATIENT
Start: 2022-06-02

## 2022-06-02 ASSESSMENT — ASTHMA QUESTIONNAIRES: ACT_TOTALSCORE: 17

## 2022-06-02 ASSESSMENT — PATIENT HEALTH QUESTIONNAIRE - PHQ9
10. IF YOU CHECKED OFF ANY PROBLEMS, HOW DIFFICULT HAVE THESE PROBLEMS MADE IT FOR YOU TO DO YOUR WORK, TAKE CARE OF THINGS AT HOME, OR GET ALONG WITH OTHER PEOPLE: SOMEWHAT DIFFICULT
SUM OF ALL RESPONSES TO PHQ QUESTIONS 1-9: 7
SUM OF ALL RESPONSES TO PHQ QUESTIONS 1-9: 7

## 2022-06-02 NOTE — PROGRESS NOTES
Olga is a 32 year old who is being evaluated via a billable video visit.      How would you like to obtain your AVS? MyChart  If the video visit is dropped, the invitation should be resent by: Text to cell phone: [phone]  Will anyone else be joining your video visit? No      Video Start Time: 12:50 PM    Assessment and Plan:     (U07.1) Infection due to 2019 novel coronavirus  (primary encounter diagnosis)  Comment: mild symptoms, fully vaccinated w/one booster  Plan: unfortunately she is not a candidate for paxlovid due to tegretol, applied for MAb therapy through [facility] and she is a candidate, will call tomorrow if she hasn't heard from them  Reviewed when to be seen promptly    (J45.20) Mild intermittent asthma without complication  Comment: has had some tightness and out of albuterol  Plan: albuterol (PROAIR HFA/PROVENTIL HFA/VENTOLIN         HFA) 108 (90 Base) MCG/ACT inhaler  To ED if tightness not resolved w/albuterol      Zakia Maddox PA-C            Subjective   Olga is a 32 year old who presents for the following health issues  HPI     Here for covid   She tested positive yesterday via rapid antigen test at home  She started having symptoms two days ago  She is having cough, headache, sinus pressure, body aches and fever   She denies leg swelling, chest pain  She has asthma and feels tight, would   She would like paxlovid     COVID-19 Symptom Review  How many days ago did these symptoms start? 2    Are any of the following symptoms significant for you?    New or worsening difficulty breathing? No    Worsening cough? Yes, I am coughing up mucus and dry    Fever or chills? Yes, the highest temperature was 100.5 as of 6/1    Headache: YES    Sore throat: YES    Chest pain: YES- tightness    Diarrhea: no    Body aches? YES    What treatments has patient tried? Dayquil, pain patch, tylenol   Does patient live in a nursing home, group home, or shelter? no  Does patient have a way to get  food/medications during quarantined? Yes, I have a friend or family member who can help me.        Review of Systems   See above      Objective         Vitals:  No vitals were obtained today due to virtual visit.    Physical Exam   GENERAL: Healthy, alert and no distress  EYES: Eyes grossly normal to inspection.  No discharge or erythema, or obvious scleral/conjunctival abnormalities.  RESP: No audible wheeze, cough, or visible cyanosis.  No visible retractions or increased work of breathing.    SKIN: Visible skin clear. No significant rash, abnormal pigmentation or lesions.  NEURO: Cranial nerves grossly intact.  Mentation and speech appropriate for age.  PSYCH: Mentation appears normal, affect normal/bright, judgement and insight intact, normal speech and appearance well-groomed.          Video-Visit Details    Type of service:  Video Visit    Video End Time:12:58 PM    Originating Location (pt. Location): Home    Distant Location (provider location):  [facility]     Platform used for Video Visit: Jenniffer

## 2022-06-02 NOTE — PATIENT INSTRUCTIONS
Please call tomorrow if you do not hear from [facility] regarding monoclonal antibodies    Please isolate according to CDC guidelines:  https://www.cdc.gov/coronavirus/2019-ncov/your-health/quarantine-isolation.html

## 2022-06-03 ENCOUNTER — HOME INFUSION (PRE-WILLOW HOME INFUSION) (OUTPATIENT)
Dept: PHARMACY | Facility: CLINIC | Age: 32
End: 2022-06-03
Payer: COMMERCIAL

## 2022-06-06 ENCOUNTER — HOME INFUSION (PRE-WILLOW HOME INFUSION) (OUTPATIENT)
Dept: PHARMACY | Facility: CLINIC | Age: 32
End: 2022-06-06
Payer: COMMERCIAL

## 2022-06-10 NOTE — PROGRESS NOTES
This is a recent snapshot of the patient's [facility] medical record.  For current drug dose and complete information and questions, call [phone] or In Basket pool, [facility]v home infusion ([id number])  CSN Number:  [id number]

## 2022-06-17 NOTE — PROGRESS NOTES
This is a recent snapshot of the patient's [facility] medical record.  For current drug dose and complete information and questions, call [phone] or In Basket pool, fv home infusion ([id number])  CSN Number:  [id number]

## 2022-07-08 ENCOUNTER — TELEPHONE (OUTPATIENT)
Dept: INTERNAL MEDICINE | Facility: CLINIC | Age: 32
End: 2022-07-08

## 2022-07-08 NOTE — TELEPHONE ENCOUNTER
Patient Quality Outreach    Patient is due for the following:   Asthma  -  AAP  Physical  - due now    NEXT STEPS:   Schedule a yearly physical    Type of outreach:    Sent Motiv[employer] message.      Questions for provider review:    None     Ericka Holcomb LPN  Chart routed to none.

## 2022-09-22 DIAGNOSIS — G47.33 OSA (OBSTRUCTIVE SLEEP APNEA): Primary | ICD-10-CM

## 2022-11-19 ENCOUNTER — HEALTH MAINTENANCE LETTER (OUTPATIENT)
Age: 32
End: 2022-11-19

## 2023-03-04 NOTE — ED AVS SNAPSHOT
-- DO NOT REPLY / DO NOT REPLY ALL --  -- Message is from Engagement Center Operations (ECO) --    ONLY TO BE USED WITHIN A REFILL MEDICATION ENCOUNTER    Med Refill  Is the patient currently having any symptoms?: No/Non-Emergent symptoms    Name of medication requested: See pended med    Has patient contacted the pharmacy? Yes    Is this the first request for the medication in the last 48 hours?: Yes      Patient is requesting a medication refill - medication is on active list      Full name of the provider who ordered the medication: Deny Engel    [facility] site name / Account # for provider: [place]     Preferred Pharmacy: Pharmacy  [id number] - [place], [place] - [address]    Patient confirmed the above pharmacy as correct?  Yes      Caller Information       Type Contact Phone/Fax    03/04/2023 03:49 PM CST Phone (Incoming) [place] New (Self) [phone] (M)          Alternative phone number: no    Can a detailed message be left?: Yes    Patient is completely out of medication: Verify if patient is currently experiencing symptoms. If patient is symptomatic, proceed with front end triage instead of medication refill. If patient is not symptomatic but is completely out of medication, jo as High priority when routing. Inform patient: “Please call back with any questions or concerns and if your condition becomes life threatening, you should seek immediate medical assistance by calling 911 or going to the Emergency Department for evaluation.”    Inform all patients: \"If the clinical team needs to contact you regarding this refill, please be aware the return phone call may come from an unidentified or out of state phone number and your refill request will be addressed as soon as the clinical team reviews your message.\"   [facility]  [address]  [facility] [address]  Phone: [phone]  Fax: [phone]                                    Olga Sheehan   MRN: [id number]    Department: [facility] Emergency [facility]   Date of Visit: 1/16/2021           After Visit Summary Signature Page    I have received my discharge instructions, and my questions have been answered. I have discussed any challenges I see with this plan with the nurse or doctor.    ..........................................................................................................................................  Patient/Patient Representative Signature      ..........................................................................................................................................  Patient Representative Print Name and Relationship to Patient    ..................................................               ................................................  Date                                   Time    ..........................................................................................................................................  Reviewed by Signature/Title    ...................................................              ..............................................  Date                                               Time          22EPIC Rev 08/18

## 2023-03-06 ENCOUNTER — E-VISIT (OUTPATIENT)
Dept: URGENT CARE | Facility: CLINIC | Age: 33
End: 2023-03-06
Payer: COMMERCIAL

## 2023-03-06 DIAGNOSIS — B96.89 ACUTE BACTERIAL BRONCHITIS: Primary | ICD-10-CM

## 2023-03-06 DIAGNOSIS — J20.8 ACUTE BACTERIAL BRONCHITIS: Primary | ICD-10-CM

## 2023-03-06 PROCEDURE — 99421 OL DIG E/M SVC 5-10 MIN: CPT | Performed by: PHYSICIAN ASSISTANT

## 2023-03-06 RX ORDER — ALBUTEROL SULFATE 90 UG/1
2 AEROSOL, METERED RESPIRATORY (INHALATION) EVERY 6 HOURS
Qty: 18 G | Refills: 0 | Status: SHIPPED | OUTPATIENT
Start: 2023-03-06 | End: 2024-05-08

## 2023-03-06 RX ORDER — DOXYCYCLINE HYCLATE 100 MG
100 TABLET ORAL 2 TIMES DAILY
Qty: 14 TABLET | Refills: 0 | Status: SHIPPED | OUTPATIENT
Start: 2023-03-06 | End: 2023-03-13

## 2023-03-06 NOTE — PATIENT INSTRUCTIONS
"    Dear Olga Sheehan    After reviewing your responses, I've been able to diagnose you with \"Bronchitis\" which is a common infection of your lungs. While this is most commonly caused by a virus, the symptoms you have given suggest you should be treated with antibiotics.     I have sent albuterol and doxy to your pharmacy to treat this infection.     It is important that you take all of your prescribed medication even if your symptoms are improving after a few doses. Taking all of your medicine helps prevent the symptoms from returning.     If your symptoms worsen, you develop chest pain or shortness of breath, fevers over 101, or are not improving in 5 days, please contact your primary care provider for an appointment or visit any of our convenient Walk-in Care or Urgent Care Centers to be seen which can be found on our website here.    Thanks again for choosing us as your health care partner,    Francesco Yi [facility], PA-C    "

## 2023-05-19 ENCOUNTER — E-VISIT (OUTPATIENT)
Dept: URGENT CARE | Facility: CLINIC | Age: 33
End: 2023-05-19
Payer: COMMERCIAL

## 2023-05-19 DIAGNOSIS — R30.0 DYSURIA: Primary | ICD-10-CM

## 2023-05-19 PROCEDURE — 99421 OL DIG E/M SVC 5-10 MIN: CPT | Performed by: PREVENTIVE MEDICINE

## 2023-05-19 NOTE — PATIENT INSTRUCTIONS
Dear Olga Sheehan,     After reviewing your responses, I would like you to come in for a urine test to make sure we treat you correctly. This urine test is to evaluate you for a possible urinary tract infection, and should be scheduled for today or tomorrow. Schedule a Lab Only appointment here.     Lab appointments are not available at most locations on the weekends. If no Lab Only appointment is available, you should be seen in any of our convenient Walk-in or Urgent Care Centers, which can be found on our website here.     You will receive instructions with your results in [place] once they are available.     If your symptoms worsen, you develop pain in your back or stomach, develop fevers, or are not improving in 5 days, please contact your primary care provider for an appointment or visit a Walk-in or Urgent Care Center to be seen.     Thanks again for choosing us as your health care partner,     Telly Grant MD, MD    Dysuria with Uncertain Cause (Adult)    The urethra is the tube that allows urine to pass out of the body. In a woman, the urethra is the opening above the vagina. In men, the urethra is the opening on the tip of the penis. Dysuria is the feeling of pain or burning in the urethra when passing urine.   Dysuria can be caused by anything that irritates or inflames the urethra. An infection or chemical irritation can cause this reaction. A bladder infection is the most common cause of dysuria in adults. A urine test can diagnose this. A bladder infection needs antibiotic treatment.   Soaps, lotions, colognes, and feminine hygiene products can cause dysuria. So can birth control jellies, creams, and foams. It will go away 1 to 3 days after discontinuing the use of these irritants.   Sexually transmitted infections (STIs), such as chlamydia or gonorrhea, can cause dysuria. Your healthcare provider may take a culture sample. Your provider may start you on antibiotic medicine  before the culture test returns.   In women who have gone through menopause, dysuria can be from dryness in the lining of the urethra. This can be treated with hormones. Dysuria becomes long-term (chronic) when it lasts for weeks or months. You may need to see a specialist (urologist) to diagnose and treat chronic dysuria.   Home care  These home care tips may help:    Don't use any chemicals or products that you think may be causing your symptoms.    If you were given a prescription medicine, take as directed. Take it until it's all used up.    If a culture was taken, don't have sex until you have been told that it is negative. A negative culture means you don't have an infection. Then follow your healthcare provider's advice to treat your condition.  If a culture was done and it is positive:     Both you and your sexual partner may need to be treated. This is true even if your partner has no symptoms.    Contact your healthcare provider or go to an urgent care clinic or the public health department to be looked at and treated.    Don't have sex until both you and your partner have finished all antibiotics and your healthcare provider says you are no longer contagious.    Learn about and use safe sex practices. The safest sex is with a partner who has tested negative and only has sex with you. Condoms can prevent STIs from spreading, but they aren't a guarantee.  Follow-up care  Follow up with your healthcare provider, or as advised. If a culture was taken, you may call as directed for the results. If you have an STI, follow up with your provider or the public health department for a complete STI screening, including HIV testing. For more information, contact CDC-INFO at 803-084-1015.   When to call your healthcare provider   Call your healthcare provider right away if any of these occur:    You aren't better after 3 days of treatment    Fever of 100.4 F (38 C) or higher, or as directed by your healthcare  provider    Back or belly pain that gets worse    You can't urinate because of pain    New discharge from the urethra, vagina, or penis    Painful sores on the penis    Rash or joint pain    Painful lumps (lymph nodes) in the groin    Testicle pain or swelling of the scrotum  Pedrito last reviewed this educational content on 6/1/2022 2000-2022 [employer]. All rights reserved. This information is not intended as a substitute for professional medical care. Always follow your healthcare professional's instructions.

## 2023-05-20 ENCOUNTER — LAB (OUTPATIENT)
Dept: LAB | Facility: CLINIC | Age: 33
End: 2023-05-20
Payer: COMMERCIAL

## 2023-05-20 DIAGNOSIS — R30.0 DYSURIA: ICD-10-CM

## 2023-05-20 LAB
ALBUMIN UR-MCNC: NEGATIVE MG/DL
APPEARANCE UR: CLEAR
BILIRUB UR QL STRIP: NEGATIVE
COLOR UR AUTO: YELLOW
GLUCOSE UR STRIP-MCNC: NEGATIVE MG/DL
HGB UR QL STRIP: NEGATIVE
KETONES UR STRIP-MCNC: NEGATIVE MG/DL
LEUKOCYTE ESTERASE UR QL STRIP: NEGATIVE
NITRATE UR QL: NEGATIVE
PH UR STRIP: 6.5 [PH] (ref 5–7)
SP GR UR STRIP: <=1.005 (ref 1–1.03)
UROBILINOGEN UR STRIP-ACNC: 0.2 E.U./DL

## 2023-05-20 PROCEDURE — 81003 URINALYSIS AUTO W/O SCOPE: CPT

## 2023-06-01 ENCOUNTER — HEALTH MAINTENANCE LETTER (OUTPATIENT)
Age: 33
End: 2023-06-01

## 2023-08-24 ENCOUNTER — E-VISIT (OUTPATIENT)
Dept: FAMILY MEDICINE | Facility: CLINIC | Age: 33
End: 2023-08-24
Payer: COMMERCIAL

## 2023-08-24 DIAGNOSIS — J01.80 OTHER ACUTE SINUSITIS, RECURRENCE NOT SPECIFIED: Primary | ICD-10-CM

## 2023-08-24 PROCEDURE — 99207 PR NON-BILLABLE SERV PER CHARTING: CPT | Performed by: NURSE PRACTITIONER

## 2023-08-24 RX ORDER — DOXYCYCLINE HYCLATE 100 MG
100 TABLET ORAL 2 TIMES DAILY
Qty: 14 TABLET | Refills: 0 | Status: SHIPPED | OUTPATIENT
Start: 2023-08-24 | End: 2023-08-31

## 2023-08-24 NOTE — PATIENT INSTRUCTIONS
Acute Sinusitis: Care Instructions  Overview     Acute sinusitis is an inflammation of the mucous membranes inside the nose and sinuses. Sinuses are the hollow spaces in your skull around the eyes and nose. Acute sinusitis often follows a cold. Acute sinusitis causes thick, discolored mucus that drains from the nose or down the back of the throat. It also can cause pain and pressure in your head and face along with a stuffy or blocked nose.  In most cases, sinusitis gets better on its own in 1 to 2 weeks. But some mild symptoms may last for several weeks. Sometimes antibiotics are needed if there is a bacterial infection.  Follow-up care is a key part of your treatment and safety. Be sure to make and go to all appointments, and call your doctor if you are having problems. It's also a good idea to know your test results and keep a list of the medicines you take.  How can you care for yourself at home?  Use saline (saltwater) nasal washes. This can help keep your nasal passages open and wash out mucus and allergens.  You can buy saline nose washes at a grocery store or drugstore. Follow the instructions on the package.  You can make your own at home. Add 1 teaspoon of non-iodized salt and 1 teaspoon of baking soda to 2 cups of distilled or boiled and cooled water. Fill a squeeze bottle or a nasal cleansing pot (such as a neti pot) with the nasal wash. Then put the tip into your nostril, and lean over the sink. With your mouth open, gently squirt the liquid. Repeat on the other side.  Try a decongestant nasal spray like oxymetazoline (Afrin). Do not use it for more than 3 days in a row. Using it for more than 3 days can make your congestion worse.  If needed, take an over-the-counter pain medicine, such as acetaminophen (Tylenol), ibuprofen (Advil, Motrin), or naproxen (Aleve). Read and follow all instructions on the label.  If the doctor prescribed antibiotics, take them as directed. Do not stop taking them just  "because you feel better. You need to take the full course of antibiotics.  Be careful when taking over-the-counter cold or flu medicines and Tylenol at the same time. Many of these medicines have acetaminophen, which is Tylenol. Read the labels to make sure that you are not taking more than the recommended dose. Too much acetaminophen (Tylenol) can be harmful.  Try a steroid nasal spray. It may help with your symptoms.  Breathe warm, moist air. You can use a steamy shower, a hot bath, or a sink filled with hot water. Avoid cold, dry air. Using a humidifier in your home may help. Follow the directions for cleaning the machine.  When should you call for help?   Call your doctor now or seek immediate medical care if:    You have new or worse swelling, redness, or pain in your face or around one or both of your eyes.     You have double vision or a change in your vision.     You have a high fever.     You have a severe headache and a stiff neck.     You have mental changes, such as feeling confused or much less alert.   Watch closely for changes in your health, and be sure to contact your doctor if:    You are not getting better as expected.   Where can you learn more?  Go to https://www.[employer].net/patiented  Enter I933 in the search box to learn more about \"Acute Sinusitis: Care Instructions.\"  Current as of: March 1, 2023               Content Version: 13.7    0196-0565 [employer].   Care instructions adapted under license by your healthcare professional. If you have questions about a medical condition or this instruction, always ask your healthcare professional. [employer] disclaims any warranty or liability for your use of this information.      Dear Olga Sheehan    After reviewing your responses, I've been able to diagnose you with Other acute sinusitis, recurrence not specified.      Based on your responses and diagnosis, I have prescribed No orders of the defined types were placed " in this encounter.   to treat your symptoms. I have sent this to your pharmacy.?     It is also important to stay well hydrated, get lots of rest and take over-the-counter decongestants,?tylenol?or ibuprofen if you?are able to?take those medications per your primary care provider to help relieve discomfort.?     It is important that you take?all of?your prescribed medication even if your symptoms are improving after a few doses.? Taking?all of?your medicine helps prevent the symptoms from returning.?     If your symptoms worsen, you develop severe headache, vomiting, high fever (>102), or are not improving in 7 days, please contact your primary care provider for an appointment or visit any of our convenient Walk-in Care or Urgent Care Centers to be seen which can be found on our website?here.?     Thanks again for choosing?us?as your health care partner,?   ?  Miriam Garcia NP?

## 2023-10-03 ENCOUNTER — E-VISIT (OUTPATIENT)
Dept: INTERNAL MEDICINE | Facility: CLINIC | Age: 33
End: 2023-10-03
Payer: COMMERCIAL

## 2023-10-03 DIAGNOSIS — M54.50 ACUTE MIDLINE LOW BACK PAIN WITHOUT SCIATICA: Primary | ICD-10-CM

## 2023-10-03 PROCEDURE — 99421 OL DIG E/M SVC 5-10 MIN: CPT | Performed by: NURSE PRACTITIONER

## 2023-10-04 RX ORDER — CYCLOBENZAPRINE HCL 10 MG
10 TABLET ORAL 3 TIMES DAILY PRN
Qty: 60 TABLET | Refills: 1 | Status: SHIPPED | OUTPATIENT
Start: 2023-10-04 | End: 2024-05-08

## 2023-10-04 NOTE — PATIENT INSTRUCTIONS
Thank you for choosing us for your care. I have placed an order for a prescription so that you can start treatment. View your full visit summary for details by clicking on the link below. Your pharmacist will able to address any questions you may have about the medication.      If you're not feeling better within 2-3 weeks please schedule an appointment.  You can schedule an appointment right here in [facility], or call [phone]  If the visit is for the same symptoms as your eVisit, we'll refund the cost of your eVisit if seen within seven days.    Caring for Your Back    You are not alone.    Low back pain is very common. Nearly half of all adults will have low back pain in any given year. The good news is that back pain is rarely a danger to your health. Most people can manage their back pain on their own. About half of people start feeling better within two weeks. In 9 out of 10 cases, low back pain goes away or no longer limits daily activity within 6 weeks.     Your outlook is good!     Your symptoms tell us that your low back pain is most likely not a danger to you. Most of the time we will not know the exact cause of low back pain, even if you see a doctor or have an MRI. However, treatment can still work without knowing the cause of the pain. Less than 1 in 100 people need surgery for their back pain.     What can I do about my low back pain?     There are three basic things you can do to ease low back pain and help it go away.     Use heat or cold packs.    Take medicine as directed.    Use positions, movements and exercises.    Using heat or cold packs:    Try cold packs or gentle heat to ease your pain.  Use whichever gives you the most relief. Apply the cold pack or heat for 15 minutes at a time, as often as needed.    Taking medicine:    If taking over-the-counter medicine:    Take ibuprofen (Advil, Motrin) 600 mg three times a day as needed for pain.  OR    Take Aleve (naproxen) 220 to 440 mg two  times a day as needed for pain. If your doctor prescribed a muscle relaxant (cyclobenzaprine 10 mg.):    Take   to 1 tablet at bedtime.    Do not drive when taking this medicine. This drug may make you sleepy.     Using positions, movements and exercises:    Research tells us that moving your joints and muscles can help you recover from back pain. Such activity should be simple and gentle. Use the positions below as well as walking to help relieve your pain. Try taking a short walk every 3 to 4 hours during the day. Walk for a few minutes inside your home or take longer walks outside, on a treadmill or at a mall. Slowly increase the amount of time you walk. Expect discomfort when you begin, but it should lessen as your back starts to heal. When your back feels better, walk daily to keep your back and body healthy.    Finding a position that is comfortable:    When your back pain is new, certain positions will ease your pain. Gently try each of the positions below until you find one that is helpful. Once you find a position of comfort, use it as often as you like when you are resting. You will recover faster if you combine rest with activity.    * Lie on your back with your legs bent. You can do this by placing a pillow under your knees or lie on the floor and rest your lower legs on the seat of a chair.  * Lie on your side with your knees bent and place a pillow between your knees.    Lie on your stomach over pillows.       When should I call my doctor?    Your back pain should improve over the first couple of weeks. As it improves, you should be able to return to your normal activities.  But call your doctor if:      You have a sudden change in your ability to control your bladder or bowels.    You begin to feel tingling in your groin or legs.    The pain spreads down your leg and into your foot.    Your toes, feet or leg muscles begin to feel weak.    You feel generally unwell or sick.    Your pain gets  worse.    If you are deaf or hard of hearing, please let us know. We provide many free services including sign language interpreters, oral interpreters, TTYs, telephone amplifiers, note takers and written materials.    For informational purposes only. Not to replace the advice of your health care provider. Copyright   2013 [facility]. All rights reserved. [place] [id number] - 04/13.    Mini Relaxation Exercises  Source www.[employer].org    Mini relaxation exercises are focused breathing techniques that help reduce  anxiety and tension immediately. You can do them with your eyes open or  closed. You can do them any place, at any time; no one will know that you are  doing them.    Good Times to Do a  Mini     Before taking an exam    While at the computer lab waiting for your document to print    While waiting in line    When someone says something that bothers you    While waiting for the announcement of a grade    While waiting for a phone call    In the dentist s chair    When you feel overwhelmed by what you need to accomplish in the near  future    When in pain    When you want to     Mini Version 1- Breath Focused  Position yourself in a supportive comfortable chair or lying in a position that is least painful. (Often this is on your back with pillows under your knees)    Count very slowly to yourself from ten down to zero, one number for  each breath. Thus, with the first diaphragmatic breath, you say  ten  to  yourself, with the next breath, you say  nine , etc. If you start feeling  light-headed or dizzy, slow down the counting. When you get to  zero ,  see how you are feeling. If you are feeling better, great! If not, try to  do it again.    b. As you inhale, count very slowly up to four; as you exhale, count slowly  back down to one. Thus, as you inhale, you say to yourself  one, two,  three, four , as you exhale, you say to yourself  four, three, two, one .  Do this several times.    c.  After each inhalation, pause for a few seconds; after you exhale, pause  again for a few seconds. Do this for several breaths.    Mini Version 2- Physical Focused  a. Massage your forehead, jaw, back of neck, and shoulders  b. Stretch and yawn  c. Walk counting four paces as you breathe in and four paces as you  breathe out  d. Coordinate your breath with any activity, for example, writing, jogging,  drawing, lifting weights, walking, etc.    Mini Version 3- Imagery Focused  Position yourself in a supportive comfortable chair or lying in a position that is least painful. (Often this is on your back with pillows under your knees)    a. Briefly picture yourself in a place you find relaxing  b. Contemplate a picture of a natural scene  c. Imagine the characteristics of one of the following or any other object  which portrays characteristics you find calming or supportive in the  moment:    Tree (strong, rooted, expansive)    Mountain (timeless, strong, stable)    Waves (fluid, limitless)    Sun (radiant, warm)    Wind (free)    Mini Version 4- Mindfulness Focused  a. Look out the window for a few moments  b. Notice something new  c. Listen. What is the most distant sound you hear? The next distant?  d. Appreciate the sensation of being in the situation, the feel, smell, sight of  certain objects  e. Focus on being exactly where you are, keeping your thoughts from flowing  into the future or past

## 2024-01-15 ENCOUNTER — DOCUMENTATION ONLY (OUTPATIENT)
Dept: SLEEP MEDICINE | Facility: CLINIC | Age: 34
End: 2024-01-15

## 2024-01-15 DIAGNOSIS — G47.33 OBSTRUCTIVE SLEEP APNEA: Primary | ICD-10-CM

## 2024-01-29 NOTE — PROGRESS NOTES
CPAP DME order signed. Last visit was 9/2021. Msg sent requesting she make a follow-up visit.  Bennett Goltz, PA-C

## 2024-02-23 ENCOUNTER — PATIENT OUTREACH (OUTPATIENT)
Dept: INTERNAL MEDICINE | Facility: CLINIC | Age: 34
End: 2024-02-23

## 2024-02-23 NOTE — TELEPHONE ENCOUNTER
Patient Quality Outreach    Patient is due for the following:   Asthma  -  ACT needed  Hypertension -  Hypertension follow-up visit  Cervical Cancer Screening - PAP Needed  Physical Preventive Adult Physical    Next Steps:   Schedule a Adult Preventative    Type of outreach:    Sent Fannect message.      Questions for provider review:    None           Ericka Holcomb LPN  Chart routed to none.

## 2024-03-10 ENCOUNTER — HOSPITAL ENCOUNTER (EMERGENCY)
Facility: CLINIC | Age: 34
Discharge: HOME | End: 2024-03-10
Attending: EMERGENCY MEDICINE | Admitting: EMERGENCY MEDICINE
Payer: COMMERCIAL

## 2024-03-10 ENCOUNTER — APPOINTMENT (OUTPATIENT)
Dept: MRI IMAGING | Facility: CLINIC | Age: 34
End: 2024-03-10
Attending: EMERGENCY MEDICINE
Payer: COMMERCIAL

## 2024-03-10 VITALS
SYSTOLIC BLOOD PRESSURE: 154 MMHG | DIASTOLIC BLOOD PRESSURE: 100 MMHG | HEART RATE: 88 BPM | OXYGEN SATURATION: 98 % | RESPIRATION RATE: 22 BRPM | WEIGHT: 293 LBS | TEMPERATURE: 97.6 F | HEIGHT: 67 IN | BODY MASS INDEX: 45.99 KG/M2

## 2024-03-10 DIAGNOSIS — M54.42 ACUTE MIDLINE LOW BACK PAIN WITH LEFT-SIDED SCIATICA: Primary | ICD-10-CM

## 2024-03-10 PROCEDURE — 250N000013 HC RX MED GY IP 250 OP 250 PS 637: Performed by: EMERGENCY MEDICINE

## 2024-03-10 PROCEDURE — 72148 MRI LUMBAR SPINE W/O DYE: CPT

## 2024-03-10 PROCEDURE — 99284 EMERGENCY DEPT VISIT MOD MDM: CPT | Mod: 25

## 2024-03-10 RX ORDER — CYCLOBENZAPRINE HCL 10 MG
10 TABLET ORAL 3 TIMES DAILY PRN
Qty: 21 TABLET | Refills: 0 | Status: SHIPPED | OUTPATIENT
Start: 2024-03-10 | End: 2024-03-17

## 2024-03-10 RX ORDER — CYCLOBENZAPRINE HCL 10 MG
10 TABLET ORAL ONCE
Status: COMPLETED | OUTPATIENT
Start: 2024-03-10 | End: 2024-03-10

## 2024-03-10 RX ORDER — ACETAMINOPHEN 325 MG/1
975 TABLET ORAL ONCE
Status: COMPLETED | OUTPATIENT
Start: 2024-03-10 | End: 2024-03-10

## 2024-03-10 RX ORDER — LIDOCAINE 4 G/G
1 PATCH TOPICAL ONCE
Status: DISCONTINUED | OUTPATIENT
Start: 2024-03-10 | End: 2024-03-10 | Stop reason: HOSPADM

## 2024-03-10 RX ORDER — OXYCODONE HYDROCHLORIDE 5 MG/1
5 TABLET ORAL ONCE
Status: COMPLETED | OUTPATIENT
Start: 2024-03-10 | End: 2024-03-10

## 2024-03-10 RX ORDER — LORAZEPAM 1 MG/1
1 TABLET ORAL
Status: COMPLETED | OUTPATIENT
Start: 2024-03-10 | End: 2024-03-10

## 2024-03-10 RX ORDER — LIDOCAINE 4 G/G
1 PATCH TOPICAL EVERY 24 HOURS
Qty: 20 PATCH | Refills: 0 | Status: SHIPPED | OUTPATIENT
Start: 2024-03-10 | End: 2024-05-08

## 2024-03-10 RX ADMIN — CYCLOBENZAPRINE 10 MG: 10 TABLET, FILM COATED ORAL at 08:30

## 2024-03-10 RX ADMIN — LIDOCAINE 1 PATCH: 4 PATCH TOPICAL at 12:09

## 2024-03-10 RX ADMIN — ACETAMINOPHEN 975 MG: 325 TABLET, FILM COATED ORAL at 10:34

## 2024-03-10 RX ADMIN — OXYCODONE HYDROCHLORIDE 5 MG: 5 TABLET ORAL at 12:53

## 2024-03-10 RX ADMIN — LORAZEPAM 1 MG: 1 TABLET ORAL at 09:30

## 2024-03-10 ASSESSMENT — ENCOUNTER SYMPTOMS
NUMBNESS: 1
BACK PAIN: 1
COUGH: 0
DIFFICULTY URINATING: 0
FEVER: 0
DYSURIA: 0
CHILLS: 0
SHORTNESS OF BREATH: 0
NAUSEA: 0
WEAKNESS: 1
ABDOMINAL PAIN: 0
NECK PAIN: 0
HEMATURIA: 0
RHINORRHEA: 0
VOMITING: 0

## 2024-03-10 ASSESSMENT — COLUMBIA-SUICIDE SEVERITY RATING SCALE - C-SSRS
1. IN THE PAST MONTH, HAVE YOU WISHED YOU WERE DEAD OR WISHED YOU COULD GO TO SLEEP AND NOT WAKE UP?: NO
6. HAVE YOU EVER DONE ANYTHING, STARTED TO DO ANYTHING, OR PREPARED TO DO ANYTHING TO END YOUR LIFE?: NO
2. HAVE YOU ACTUALLY HAD ANY THOUGHTS OF KILLING YOURSELF IN THE PAST MONTH?: NO

## 2024-03-10 ASSESSMENT — ACTIVITIES OF DAILY LIVING (ADL)
ADLS_ACUITY_SCORE: 35
ADLS_ACUITY_SCORE: 33
ADLS_ACUITY_SCORE: 33
ADLS_ACUITY_SCORE: 35
ADLS_ACUITY_SCORE: 35

## 2024-03-10 NOTE — DISCHARGE INSTRUCTIONS
Please follow-up with your primary care provider and/or specialist regarding your visit to the ER today.    Please return to the emergency department should you experience any of the symptoms we specifically discussed, including but not limited to recurrence or worsening of your symptoms, or development of any new and concerning symptoms such as numbness in the groin, weakness in the legs, difficulty with urination, urinating yourself, or inability to control your bowel function.

## 2024-03-10 NOTE — ED TRIAGE NOTES
"Pt to ER w c/o lower back pain. States a \"bed with bad support broke and folded me in half and I heard a click\". Pt states she can't sleep d/t pain, rates pain 8/10. VSS, A&Ox4, ABCs intact.        "

## 2024-03-10 NOTE — ED PROVIDER NOTES
History     Chief Complaint:  Back Pain       HPI   Olga Sheehan is a 34 year old female presents to the emergency department with low back pain.  Patient reports that roughly 2 weeks ago she was at a trip in which the bed broke which folded her and she felt like she injured her low back.  Patient states that since then, she has been nursing the injury and avoiding excessive use of her back.  Patient however states that she had some prolonged walking yesterday while moving and feels that it exacerbated her pain.  Last night, she was unable to sleep as she is having 8 out of 10 pain.  Patient describes the pain as an aching quality, with radiation from her midline low back to her left flank and the spasming quality.  Patient states that she no longer has her Flexeril since her last back pain.  Patient endorses new numbness and weakness to her left lower extremity.  She feels that it is harder for her to walk on her left leg.  Denies saddle anesthesia.  Denies urinary incontinence, urinary retention, fecal incontinence, fevers, abdominal pain, dysuria, hematuria, or history of IV drug use.  No prior history of spine surgery.    Review of Systems   Constitutional:  Negative for chills and fever.   HENT:  Negative for congestion and rhinorrhea.    Respiratory:  Negative for cough and shortness of breath.    Cardiovascular:  Negative for chest pain.   Gastrointestinal:  Negative for abdominal pain, nausea and vomiting.   Genitourinary:  Negative for difficulty urinating, dysuria and hematuria.   Musculoskeletal:  Positive for back pain. Negative for neck pain.   Neurological:  Positive for weakness and numbness.       Independent Historian:   None - Patient Only    Review of External Notes:   10/3/23 E visit note for back pain      Medications:    cyclobenzaprine (FLEXERIL) 10 MG tablet  Lidocaine (LIDOCARE) 4 % Patch  albuterol (PROAIR HFA/PROVENTIL HFA/VENTOLIN HFA) 108 (90 Base) MCG/ACT inhaler  albuterol  "(PROVENTIL HFA) 108 (90 Base) MCG/ACT inhaler  carBAMazepine (TEGRETOL) 200 MG tablet  cyclobenzaprine (FLEXERIL) 10 MG tablet  LATUDA 20 MG TABS tablet  MELATONIN PO  OLANZapine (ZYPREXA) 2.5 MG tablet        Past Medical History:    Past Medical History:   Diagnosis Date    Abdominal pain     Anxiety and depression     Asthma     Bipolar affective disorder (H)     Depressive disorder     Obese     Sleep apnea        Past Surgical History:    Past Surgical History:   Procedure Laterality Date    ABDOMEN SURGERY  10/10/2019    Gallbladder removal    ENT SURGERY      wisdom    EXAM UNDER ANESTHESIA RECTUM N/A 11/21/2018    Procedure: EXAM UNDER ANESTHESIA;  Surgeon: Chanel Parker MD;  Location: [facility]    INCISION AND DRAINAGE RECTUM, COMBINED N/A 6/28/2018    Procedure: COMBINED INCISION AND DRAINAGE RECTUM;  Incision and drainage of left ischiorectal fossa abscess;  Surgeon: Dave Davis MD;  Location: [facility] OR    LAPAROSCOPIC CHOLECYSTECTOMY N/A 10/10/2019    Procedure: LAPAROSCOPIC CHOLECYSTECTOMY;  Surgeon: Olga Adam MD;  Location: [facility] OR    PLACEMENT OF SETON RECTUM N/A 11/21/2018    Procedure: PLACEMENT OF SETON RECTUM;  Surgeon: Chanel Parker MD;  Location: [facility]    RECTUM LIFT PROCEDURE RECTAL APPROACH N/A 3/28/2019    Procedure: Ligation of intersphincteric fistula tract;  Surgeon: Chanel Parker MD;  Location: [facility] OR        Physical Exam   Patient Vitals for the past 24 hrs:   BP Temp Temp src Pulse Resp SpO2 Height Weight   03/10/24 1254 -- -- -- -- -- -- 1.702 m (5' 7\") (!) 174.6 kg (385 lb)   03/10/24 1233 -- -- -- -- -- 98 % -- --   03/10/24 1230 (!) 154/100 -- -- 88 -- 99 % -- --   03/10/24 1034 -- -- -- -- -- 99 % -- --   03/10/24 1033 -- -- -- -- -- 98 % -- --   03/10/24 1032 -- -- -- -- -- 97 % -- --   03/10/24 1030 (!) 152/99 -- -- 91 -- 99 % -- --   03/10/24 0845 (!) 141/79 -- -- 82 22 97 % -- --   03/10/24 0633 (!) 175/80 97.6  F (36.4  C) Temporal 95 24 99 % -- -- "       constitutional:       General: Not in acute distress.     Appearance: Normal appearance.   HENT:      Head: Normocephalic and atraumatic.   Eyes:      Extraocular Movements: Extraocular movements intact.      Conjunctiva/sclera: Conjunctivae normal.   Cardiovascular:      Rate and Rhythm: Normal rate and regular rhythm.   Pulmonary:      Effort: Pulmonary effort is normal. No respiratory distress.      Breath sounds: Normal breath sounds.   Abdominal:      General: Abdomen is flat. There is no distension.      Palpations: Abdomen is soft.      Tenderness: There is no abdominal tenderness.   Musculoskeletal:      Cervical back: Normal range of motion and neck supple. No rigidity or tenderness.      Back Exam: No midline C/T spine tenderness to palpation.  Midline L-spine tenderness to palpation.     Lower Extremity: 5/5 right hip flexion, hip extension, knee flexion, knee extension, ankle flexion, and ankle extension. 4/5 left hip flexion, hip extension, knee flexion, and knee extension. 5/5 left ankle flexion, and ankle extension. Even sensation in bilateral lower extremities.     Right lower leg: No edema.      Left lower leg: No edema.   Skin:     General: Skin is warm and dry.   Neurological:      General: No focal deficit present.      Mental Status: Alert and oriented to person, place, and time.   Psychiatric:         Mood and Affect: Mood normal.         Behavior: Behavior normal.    Emergency Department Course     Imaging:  Lumbar spine MRI w/o contrast   Final Result   IMPRESSION:   1.  Multilevel degenerative changes of the lumbar spine as detailed above, greatest at L4-L5, where there is mild to moderate right neuroforaminal stenosis with abutment of the exiting right L4 nerve root and mild left neuroforaminal stenosis.   2.  At L5-S1, there is mild left neuroforaminal stenosis.               Report per radiology    Laboratory:  Labs Ordered and Resulted from Time of ED Arrival to Time of ED  Departure - No data to display       Emergency Department Course & Assessments:       Interventions:  Medications   cyclobenzaprine (FLEXERIL) tablet 10 mg (10 mg Oral $Given 3/10/24 0830)   LORazepam (ATIVAN) tablet 1 mg (1 mg Oral $Given 3/10/24 0930)   acetaminophen (TYLENOL) tablet 975 mg (975 mg Oral $Given 3/10/24 1034)   oxyCODONE (ROXICODONE) tablet 5 mg (5 mg Oral $Given 3/10/24 1253)        Independent Interpretation (X-rays, CTs, rhythm strip):  None    Assessments/Consultations/Discussion of Management or Tests:  ED Course as of 03/10/24 1805   Sun Mar 10, 2024   0844 Patient requires ativan for MRI   1240 Lumbar spine MRI w/o contrast  1.  Multilevel degenerative changes of the lumbar spine as detailed above, greatest at L4-L5, where there is mild to moderate right neuroforaminal stenosis with abutment of the exiting right L4 nerve root and mild left neuroforaminal stenosis.  2.  At L5-S1, there is mild left neuroforaminal stenosis.     1246 Patient updated on image findings.  Flexeril did help relieve some of the symptoms, but not completely removed the symptoms.  Will discharge patient with Flexeril prescription and lidocaine patch.  Will give ED course of oxycodone to help assist with her transportation back home.  Advised patient that unfortunately, general guideline is to not prescribe opioid pain medication for chronic type low back pain. Information for ortho spine/neurosurgery included in discharge paperwork. She voiced understanding and agrees with plan.  Discussed return precautions.  Answered all questions.       Social Determinants of Health affecting care:   None    Disposition:  The patient was discharged to home.     Impression & Plan    CMS Diagnoses: None    Medical Decision Makin-year-old female as described above presents to the emergency department for low back pain.  Patient hemodynamically stable at time of evaluation.  Afebrile.  No palpable mass or fluctuance to the lower  spine.  Patient does have midline L-spine tenderness to palpation.  New left lower extremity paresthesia in addition to weakness, in particular with hip flexion, hip extension, knee extension, and knee flexion on exam.  Patient unable to take ibuprofen due to lithium usage.  Will trial cyclobenzaprine in addition to lidocaine patch.  MRI of the lumbar spine due to above described new symptoms for evaluation for spinal cord compression/rule out cauda equina.  If workup negative, likely discharge with muscle relaxer and lidocaine patch and follow-up with outpatient spine surgery.  Discussed care plan with patient who voiced understanding and agreement with plan.  Answered all questions.  Additional workup and orders as listed in chart.    Please refer to ED course above as part of continuation of MDM for details on the patient's treatment course and any changes or updates in care plan beyond my initial evaluation and MDM creation.      Diagnosis:    ICD-10-CM    1. Acute midline low back pain with left-sided sciatica  M54.42            Discharge Medications:  Discharge Medication List as of 3/10/2024 12:49 PM        START taking these medications    Details   !! cyclobenzaprine (FLEXERIL) 10 MG tablet Take 1 tablet (10 mg) by mouth 3 times daily as needed for muscle spasms, Disp-21 tablet, R-0, E-Prescribe      Lidocaine (LIDOCARE) 4 % Patch Place 1 patch onto the skin every 24 hours To prevent lidocaine toxicity, patient should be patch free for 12 hrs daily.Disp-20 patch, [id number]       !! - Potential duplicate medications found. Please discuss with provider.             STANLEY HOLDER DO  3/10/2024   Stanley Holder DO Yeh, [place], DO  03/10/24 1808

## 2024-03-13 DIAGNOSIS — F31.9 BIPOLAR DISORDER, UNSPECIFIED (H): Primary | ICD-10-CM

## 2024-03-22 ENCOUNTER — LAB (OUTPATIENT)
Dept: LAB | Facility: CLINIC | Age: 34
End: 2024-03-22
Payer: COMMERCIAL

## 2024-03-22 DIAGNOSIS — F31.9 BIPOLAR DISORDER, UNSPECIFIED (H): ICD-10-CM

## 2024-03-22 PROCEDURE — 36415 COLL VENOUS BLD VENIPUNCTURE: CPT

## 2024-03-22 PROCEDURE — 84443 ASSAY THYROID STIM HORMONE: CPT

## 2024-03-22 PROCEDURE — 80053 COMPREHEN METABOLIC PANEL: CPT

## 2024-03-22 PROCEDURE — 80178 ASSAY OF LITHIUM: CPT

## 2024-03-23 LAB
ALBUMIN SERPL BCG-MCNC: 4.3 G/DL (ref 3.5–5.2)
ALP SERPL-CCNC: 78 U/L (ref 40–150)
ALT SERPL W P-5'-P-CCNC: 31 U/L (ref 0–50)
ANION GAP SERPL CALCULATED.3IONS-SCNC: 11 MMOL/L (ref 7–15)
AST SERPL W P-5'-P-CCNC: 23 U/L (ref 0–45)
BILIRUB SERPL-MCNC: 0.2 MG/DL
BUN SERPL-MCNC: 9.2 MG/DL (ref 6–20)
CALCIUM SERPL-MCNC: 9.2 MG/DL (ref 8.6–10)
CHLORIDE SERPL-SCNC: 107 MMOL/L (ref 98–107)
CREAT SERPL-MCNC: 0.79 MG/DL (ref 0.51–0.95)
DEPRECATED HCO3 PLAS-SCNC: 24 MMOL/L (ref 22–29)
EGFRCR SERPLBLD CKD-EPI 2021: >90 ML/MIN/1.73M2
GLUCOSE SERPL-MCNC: 112 MG/DL (ref 70–99)
POTASSIUM SERPL-SCNC: 3.7 MMOL/L (ref 3.4–5.3)
PROT SERPL-MCNC: 7.4 G/DL (ref 6.4–8.3)
SODIUM SERPL-SCNC: 142 MMOL/L (ref 135–145)
TSH SERPL DL<=0.005 MIU/L-ACNC: 4 UIU/ML (ref 0.3–4.2)

## 2024-03-25 LAB — LITHIUM SERPL-SCNC: 0.27 MMOL/L (ref 0.6–1.2)

## 2024-04-15 ENCOUNTER — E-VISIT (OUTPATIENT)
Dept: INTERNAL MEDICINE | Facility: CLINIC | Age: 34
End: 2024-04-15
Payer: COMMERCIAL

## 2024-04-15 DIAGNOSIS — G43.809 OTHER MIGRAINE WITHOUT STATUS MIGRAINOSUS, NOT INTRACTABLE: Primary | ICD-10-CM

## 2024-04-15 PROCEDURE — 99421 OL DIG E/M SVC 5-10 MIN: CPT | Performed by: NURSE PRACTITIONER

## 2024-04-15 RX ORDER — RIZATRIPTAN BENZOATE 5 MG/1
5 TABLET ORAL
Qty: 18 TABLET | Refills: 1 | Status: SHIPPED | OUTPATIENT
Start: 2024-04-15

## 2024-04-15 NOTE — PATIENT INSTRUCTIONS
Thank you for choosing us for your care. I have placed an order for a prescription so that you can start treatment. View your full visit summary for details by clicking on the link below. Your pharmacist will able to address any questions you may have about the medication.     If you're not feeling better within 5-7 days, please schedule an appointment.  You can schedule an appointment right here in [facility], or call [phone]  If the visit is for the same symptoms as your eVisit, we'll refund the cost of your eVisit if seen within seven days.    Headache: Care Instructions  Overview     Headaches have many possible causes. Most headaches aren't a sign of a more serious problem, and they will get better on their own. Home treatment may help you feel better faster.  The doctor has checked you carefully, but problems can develop later. If you notice any problems or new symptoms, get medical treatment right away.  Follow-up care is a key part of your treatment and safety. Be sure to make and go to all appointments, and call your doctor if you are having problems. It's also a good idea to know your test results and keep a list of the medicines you take.  How can you care for yourself at home?  Rest in a quiet, dark room until your headache is gone. Close your eyes and try to relax or go to sleep. Don't watch TV or read.  Put a cold, moist cloth or cold pack on the painful area for 10 to 20 minutes at a time. Put a thin cloth between the cold pack and your skin.  Use a warm, moist towel or a heating pad set on low to relax tight shoulder and neck muscles.  Have someone gently massage your neck and shoulders.  Take pain medicines exactly as directed.  If the doctor gave you a prescription medicine for pain, take it as prescribed.  If you are not taking a prescription pain medicine, ask your doctor if you can take an over-the-counter medicine.  Do not ignore new symptoms that occur with a headache, such as a fever,  weakness or numbness, vision changes, or confusion. These may be signs of a more serious problem.  To prevent headaches  Keep a headache diary so you can figure out what triggers your headaches. Avoiding triggers may help you prevent headaches. Record when each headache began, how long it lasted, and what the pain was like (throbbing, aching, stabbing, or dull). Write down any other symptoms you had with the headache, such as nausea, flashing lights or dark spots, or sensitivity to bright light or loud noise. Note if the headache occurred near your period. List anything that might have triggered the headache, such as certain foods (chocolate, cheese, wine) or odors, smoke, bright light, stress, or lack of sleep.  Find healthy ways to deal with stress. Headaches are most common during or right after stressful times. Take time to relax before and after you do something that has caused a headache in the past.  Try to keep your muscles relaxed by keeping good posture. Check your jaw, face, neck, and shoulder muscles for tension, and try relaxing them. When sitting at a desk, change positions often, and stretch for 30 seconds each hour.  Get plenty of sleep and exercise.  Eat regularly. Long periods without food can trigger a headache.  Limit caffeine by not drinking too much coffee, tea, or soda. But don't quit caffeine suddenly, because that can also give you headaches.  Reduce eyestrain from computers by blinking frequently and looking away from the computer screen every so often. Make sure you have proper eyewear and that your monitor is set up properly, about an arm's length away.  When should you call for help?   Call 911 anytime you think you may need emergency care. For example, call if:    You have signs of a stroke. These may include:  Sudden numbness, paralysis, or weakness in your face, arm, or leg, especially on only one side of your body.  Sudden vision changes.  Sudden trouble speaking.  Sudden confusion  "or trouble understanding simple statements.  Sudden problems with walking or balance.  A sudden, severe headache that is different from past headaches.   Call your doctor now or seek immediate medical care if:    You have a fever and a stiff neck.     You have new nausea and vomiting, or you cannot keep down food or fluids.     Your headache gets much worse.   Watch closely for changes in your health, and be sure to contact your doctor if:    Your headaches get worse, happen more often, or change in some way.     You have new symptoms.     Your life is disrupted by your headaches. For example, you often miss work, school, or other activities.     You do not get better as expected.   Where can you learn more?  Go to https://www.[employer].net/patiented  Enter M271 in the search box to learn more about \"Headache: Care Instructions.\"  Current as of: December 20, 2023               Content Version: 14.0    2029-4421 [employer].   Care instructions adapted under license by your healthcare professional. If you have questions about a medical condition or this instruction, always ask your healthcare professional. [employer] disclaims any warranty or liability for your use of this information.      "

## 2024-05-07 ASSESSMENT — PATIENT HEALTH QUESTIONNAIRE - PHQ9
SUM OF ALL RESPONSES TO PHQ QUESTIONS 1-9: 16
10. IF YOU CHECKED OFF ANY PROBLEMS, HOW DIFFICULT HAVE THESE PROBLEMS MADE IT FOR YOU TO DO YOUR WORK, TAKE CARE OF THINGS AT HOME, OR GET ALONG WITH OTHER PEOPLE: SOMEWHAT DIFFICULT
SUM OF ALL RESPONSES TO PHQ QUESTIONS 1-9: 16

## 2024-05-08 ENCOUNTER — VIRTUAL VISIT (OUTPATIENT)
Dept: INTERNAL MEDICINE | Facility: CLINIC | Age: 34
End: 2024-05-08
Payer: COMMERCIAL

## 2024-05-08 DIAGNOSIS — M54.42 LOW BACK PAIN WITH LEFT-SIDED SCIATICA, UNSPECIFIED BACK PAIN LATERALITY, UNSPECIFIED CHRONICITY: Primary | ICD-10-CM

## 2024-05-08 PROCEDURE — 99214 OFFICE O/P EST MOD 30 MIN: CPT | Mod: 95 | Performed by: NURSE PRACTITIONER

## 2024-05-08 RX ORDER — CYCLOBENZAPRINE HCL 10 MG
10 TABLET ORAL 3 TIMES DAILY PRN
Qty: 90 TABLET | Refills: 1 | Status: SHIPPED | OUTPATIENT
Start: 2024-05-08 | End: 2024-06-27

## 2024-05-08 RX ORDER — LITHIUM CARBONATE 450 MG
450 TABLET, EXTENDED RELEASE ORAL
COMMUNITY
Start: 2024-05-07

## 2024-05-08 NOTE — LETTER
May 8, 2024      Olga Sheehan  [address] [place] [id number]    Attention:   [email or web address]        To Whom It May Concern:    Olga Sheehan was seen in our clinic. She will need to work from home, until her back is doing better. At this time, she would be unable to walk from parking structure to her work place, and continued sitting may also cause issues with her back.         Sincerely,         Amina Banegas CNP

## 2024-05-08 NOTE — LETTER
May 8, 2024      Olga Sheehan  [address] [place] [id number]    Attention:   [email or web address]        To Whom It May Concern:    Olga Sheehan was seen in our clinic. She will need to work from home, until her back is doing better. At this time, she would be unable to walk from parking structure to her work place, and continued sitting may also cause issues with her back.         Sincerely,         Amina Banegas CNP

## 2024-05-08 NOTE — PROGRESS NOTES
"Letter was emailed and 2 copies mailed.COLIN MORROW LPN    Olga is a 34 year old who is being evaluated via a billable video visit.    How would you like to obtain your AVS? MyChart  If the video visit is dropped, the invitation should be resent by: Text to cell phone: [phone]  Will anyone else be joining your video visit? No      Assessment & Plan     Low back pain with left-sided sciatica, unspecified back pain laterality, unspecified chronicity    - cyclobenzaprine (FLEXERIL) 10 MG tablet; Take 1 tablet (10 mg) by mouth 3 times daily as needed for muscle spasms      20 minutes spent by me on the date of the encounter doing chart review, history and exam, documentation and further activities per the note      BMI  Estimated body mass index is 60.3 kg/m  as calculated from the following:    Height as of 3/10/24: 1.702 m (5' 7\").    Weight as of 3/10/24: 174.6 kg (385 lb).       Depression Screening Follow Up        5/7/2024     7:43 AM   PHQ   PHQ-9 Total Score 16   Q9: Thoughts of better off dead/self-harm past 2 weeks Not at all         Patient Instructions   Flexeril prn     Letter for work     Subjective   Olga is a 34 year old, presenting for the following health issues:  Back pain      5/8/2024    10:28 AM   Additional Questions   Roomed by Colin VALENCIA     History of Present Illness       Back Pain:  She presents for follow up of back pain. Patient's back pain is a chronic problem.  Location of back pain:  Right lower back, left lower back, right buttock, left buttock, right hip and left hip  Description of back pain: burning, cramping, sharp, shooting and stabbing  Back pain spreads: right buttocks, left buttocks and left thigh    Since patient first noticed back pain, pain is: rapidly worsening  Does back pain interfere with her job:  Yes       She eats 2-3 servings of fruits and vegetables daily.She consumes 1 sweetened beverage(s) daily.She exercises with enough effort to increase her heart rate " 10 to 19 minutes per day.  She exercises with enough effort to increase her heart rate 3 or less days per week.   She is taking medications regularly.     Doing physical therapy at [facility]   Dr Shahid seen - not surgery worthy   Gabapentin and physical therapy   Dealing with muscle spasm lower back   Francesco Chandler to be seen in future     Needs accomodation letter for work   Needs to work from home until this is better controlled   Unable to walk blocks to work from parking structure she has to park in     [email or web address] [email or web address]   at work          MRI done 3/2024 lumbar spine   Narrative & Impression   EXAM: MR LUMBAR SPINE W/O CONTRAST  LOCATION: [facility]  DATE: 3/10/2024     INDICATION: Midline low back pain, midline lumbar spine tenderness to palpation, with new numbness and tingling down left leg and left leg weakness  COMPARISON: CT abdomen/pelvis 11/08/2021  TECHNIQUE: Routine Lumbar Spine MRI without IV contrast.        FINDINGS:   Nomenclature is based on 5 lumbar type vertebral bodies. Normal vertebral body heights. Very mild broad lumbar levocurvature. Trace retrolistheses of L2 on L3 and of L3 on L4. Mild Modic 1 marrow changes on the left at T11-T12 and on the right at L4-L5.   Mild Modic 2 chronic fatty marrow changes ventrally at the L1-L2 through L4-L5 levels. No pathologic bone marrow signal abnormality.     Normal distal spinal cord and cauda equina with conus medullaris at L1-L2. Mild periarticular soft tissue edema along the bilateral L4-L5 and L5-S1 facet joints, right greater than left at both levels. No abdominal aortic aneurysm. Mild symmetric fatty   atrophy throughout the posterior paraspinous musculature and visualized gluteal musculature. Unremarkable visualized bony pelvis.        T12-L1: Mild loss of disc height and signal. Broad based left posterior/subarticular disc protrusion. Mild left facet arthropathy. No spinal canal stenosis. No right  neural foraminal stenosis. No left neural foraminal stenosis.     L1-L2: Mild loss of disc height and signal. Shallow circumferential disc bulge. Mild dorsal epidural lipomatosis. Mild bilateral facet arthropathy, right greater than left. No spinal canal stenosis. No right neural foraminal stenosis. No left neural   foraminal stenosis.     L2-L3: Normal disc height and signal. Shallow left foraminal disc protrusion. Mild dorsal epidural lipomatosis. Mild right and mild to moderate left facet arthropathy. No spinal canal stenosis. No right neural foraminal stenosis. No left neural foraminal   stenosis.     L3-L4: Normal disc height and signal. Shallow bilateral foraminal disc protrusions. Dorsal epidural lipomatosis. Mild bilateral facet arthropathy, left greater than right. No spinal canal stenosis. No right neural foraminal stenosis. No left neural   foraminal stenosis.     L4-L5: Normal disc height with partial loss of disc signal. Broad-based right foraminal disc osteophyte complex and shallow left foraminal disc osteophyte complex. Dorsal epidural lipomatosis. Mild right and moderate left facet arthropathy. No spinal   canal stenosis. Mild to moderate right neural foraminal stenosis with abutment of the exiting right L4 nerve root. Mild left neural foraminal stenosis.     L5-S1: Normal disc height and signal. Small broad-based left foraminal disc osteophyte complex. Mild right and mild to moderate left facet arthropathy. No spinal canal stenosis. No right neural foraminal stenosis. Mild left neural foraminal stenosis.                                                                         IMPRESSION:  1.  Multilevel degenerative changes of the lumbar spine as detailed above, greatest at L4-L5, where there is mild to moderate right neuroforaminal stenosis with abutment of the exiting right L4 nerve root and mild left neuroforaminal stenosis.  2.  At L5-S1, there is mild left neuroforaminal stenosis.           Review of Systems  Constitutional, neuro, ENT, endocrine, pulmonary, cardiac, gastrointestinal, genitourinary, musculoskeletal, integument and psychiatric systems are negative, except as otherwise noted.      Objective           Vitals:  No vitals were obtained today due to virtual visit.    Physical Exam   GENERAL: alert and no distress  EYES: Eyes grossly normal to inspection.  No discharge or erythema, or obvious scleral/conjunctival abnormalities.  RESP: No audible wheeze, cough, or visible cyanosis.    SKIN: Visible skin clear. No significant rash, abnormal pigmentation or lesions.  NEURO: Cranial nerves grossly intact.  Mentation and speech appropriate for age.  PSYCH: Appropriate affect, tone, and pace of words    Reviewed records       Video-Visit Details    Type of service:  Video Visit   Originating Location (pt. Location): Other car    Distant Location (provider location):  On-site  Platform used for Video Visit: [employer]iffer  Signed Electronically by: NIALL Bartlett CNP

## 2024-06-06 ENCOUNTER — PATIENT OUTREACH (OUTPATIENT)
Dept: INTERNAL MEDICINE | Facility: CLINIC | Age: 34
End: 2024-06-06
Payer: COMMERCIAL

## 2024-06-06 NOTE — TELEPHONE ENCOUNTER
Patient Quality Outreach    Patient is due for the following:   Asthma  -  ACT needed  Cervical Cancer Screening - PAP Needed  Physical Preventive Adult Physical    Next Steps:   Patient was assigned appropriate questionnaire to complete    Type of outreach:    Sent [employer] message.      Questions for provider review:    None           Ericka Holcomb LPN  Chart routed to none.

## 2024-06-16 ENCOUNTER — HEALTH MAINTENANCE LETTER (OUTPATIENT)
Age: 34
End: 2024-06-16

## 2024-06-27 DIAGNOSIS — M54.42 LOW BACK PAIN WITH LEFT-SIDED SCIATICA, UNSPECIFIED BACK PAIN LATERALITY, UNSPECIFIED CHRONICITY: ICD-10-CM

## 2024-06-27 RX ORDER — CYCLOBENZAPRINE HCL 10 MG
10 TABLET ORAL 3 TIMES DAILY PRN
Qty: 90 TABLET | Refills: 1 | Status: SHIPPED | OUTPATIENT
Start: 2024-06-27 | End: 2024-09-23

## 2024-08-26 ASSESSMENT — SLEEP AND FATIGUE QUESTIONNAIRES
HOW LIKELY ARE YOU TO NOD OFF OR FALL ASLEEP WHILE SITTING QUIETLY AFTER LUNCH WITHOUT ALCOHOL: WOULD NEVER DOZE
HOW LIKELY ARE YOU TO NOD OFF OR FALL ASLEEP WHILE LYING DOWN TO REST IN THE AFTERNOON WHEN CIRCUMSTANCES PERMIT: MODERATE CHANCE OF DOZING
HOW LIKELY ARE YOU TO NOD OFF OR FALL ASLEEP IN A CAR, WHILE STOPPED FOR A FEW MINUTES IN TRAFFIC: WOULD NEVER DOZE
HOW LIKELY ARE YOU TO NOD OFF OR FALL ASLEEP WHILE SITTING AND READING: WOULD NEVER DOZE
HOW LIKELY ARE YOU TO NOD OFF OR FALL ASLEEP WHEN YOU ARE A PASSENGER IN A CAR FOR AN HOUR WITHOUT A BREAK: WOULD NEVER DOZE
HOW LIKELY ARE YOU TO NOD OFF OR FALL ASLEEP WHILE WATCHING TV: WOULD NEVER DOZE
HOW LIKELY ARE YOU TO NOD OFF OR FALL ASLEEP WHILE SITTING INACTIVE IN A PUBLIC PLACE: WOULD NEVER DOZE
HOW LIKELY ARE YOU TO NOD OFF OR FALL ASLEEP WHILE SITTING AND TALKING TO SOMEONE: WOULD NEVER DOZE

## 2024-08-26 NOTE — PROGRESS NOTES
Virtual Visit Details    Type of service:  Video Visit   Start Time: 8:35 AM   End Time: 8:53 AM    Originating Location (pt. Location): Other car    Distant Location (provider location):  Off-site  Platform used for Video Visit: [facility]          CPAP Follow-Up Visit:    Date on this visit: 8/27/2024    Olga Sheehan has a follow-up visit today to review her CPAP use for MALINA and management of insomnia. Her medical history is significant for intermittent asthma, bipolar I depression, PTSD and obesity.      She had a home sleep study through [employer] on 6/4/2015. Her MARY was 17.8/hr, O2 debbie 79%. Her events occurred independent of position. Her weight was 365 at the time of her test.     Respironics Dreamstation 2  Auto-PAP 13.5 - 20.0 cmH2O 30 day usage data:    93% of days with > 4 hours of use. 0/30 days with no use.   Average use 526 minutes per day.   Average leak 33.88 LPM.  Average % of night in large leak 0%.    CPAP 90% pressure 14.5cm.   AHI 1.64 events per hour.    She feels CPAP is ok. She has been getting mask leak lately, but feels her mask is getting old.   She was last seen in 3/2021, so needed a visit for new supplies.      DME: [facility]    Do you use a CPAP Machine at home: Yes  Overall, on a scale of 0-10 how would you rate your CPAP (0 poor, 10 great): 6    What type of mask do you use: Nasal Mask N20  Is your mask comfortable: Yes  If not, why:    How often do you replace supplies: about every 3 months    Is your mask leaking: Yes  If yes, where do you feel it: Under my nose and around my inner eye area  How many night per week does the mask leak (0-7): 7    Do you notice snoring with mask on: Yes, recently, unsure if that coincides with leak. She does not think she opens her mouth. She has has a bite guard for bruxism, which helps keep her mouth closed.   Do you notice gasping arousals with mask on: Yes lately, she has been having post-nasal drainage.  Are you having significant oral or  nasal dryness: No  Are you using the humidifier: most of the time  Does the water chamber run out before the night is over: sometimes in the winter  Do you get condensation in the mask or hose:some in the mask, not to the point of dripping or spraying. It is not regular and not a big deal  Is the pressure setting comfortable: Yes  If not, why:      Typical bedtime: 11 pm  Sleep latency on PAP therapy: 20 minutes  Typical wake time: 630. On weekends, she may wake at the same time, other days 8:30-9 AM (usually going to bed later). She gets 6.5-7 hours  Wakes 3-4 times per night for 1 minutes. Reason for waking: change position  How many hours on average per night are you using PAP therapy: 6-7  How many hours are you sleeping per night: 6-7  Do you feel well rested in the morning: No probably related to her mental health. She has a team she is working with.    Naps: 2-3 times per week for 15 minutes.  She denies head nodding.  Her download shows fairly regular long naps in the evening lately.    Weight change since sleep study: 380 lbs    She denies morning headaches. CO2 on metabolic panel was 24.      Past medical/surgical history, family history, social history, medications and allergies were reviewed.      Problem List:  Patient Active Problem List    Diagnosis Date Noted    Cholelithiasis 10/08/2019     Priority: Medium     Added automatically from request for surgery [id number]      Bipolar affective disorder, currently manic, mild (H) 09/24/2019     Priority: Medium    Encounter for therapeutic drug monitoring 09/24/2019     Priority: Medium    MALINA (obstructive sleep apnea) 09/10/2019     Priority: Medium    Intermittent asthma 03/01/2018     Priority: Medium     Overview:   Triggered by exercise, extreme heat/cold, illness      Morbid obesity (H) 03/01/2018     Priority: Medium    Fatty liver 05/21/2015     Priority: Medium    Moderate depressed bipolar I disorder (H) 07/02/2014     Priority: Medium    Post  traumatic stress disorder (PTSD) 07/02/2014     Priority: Medium        Impression/Plan:    (G47.33) MALINA (obstructive sleep apnea)  (primary encounter diagnosis)  Comment: Olga is using CPAP regularly and benefits from use. She notes some snoring and gasping as she is falling asleep. Her download does not show any significant residual apnea or snoring. She also notices some mask leak, but there is no significant leak on the download most of the time. She has been having long afternoon naps lately, which appears to be secondary to worsening depression/anxiety. She has a mental health team with whom she works closely.  Plan: Comprehensive DME        Continue auto CPAP 13.5-20 cm. A prescription was written for new supplies. We reviewed recommendations for cleaning and replacing supplies.        Olga will follow up with me in about 2 year(s).     20 minutes were spent on the date of the encounter doing chart review, history and exam, documentation and further activities as noted above.     Bennett Goltz, PA-C    CC: No ref. provider found

## 2024-08-27 ENCOUNTER — VIRTUAL VISIT (OUTPATIENT)
Dept: SLEEP MEDICINE | Facility: CLINIC | Age: 34
End: 2024-08-27
Payer: COMMERCIAL

## 2024-08-27 VITALS — WEIGHT: 293 LBS | HEIGHT: 67 IN | BODY MASS INDEX: 45.99 KG/M2

## 2024-08-27 DIAGNOSIS — G47.33 OSA (OBSTRUCTIVE SLEEP APNEA): Primary | ICD-10-CM

## 2024-08-27 PROCEDURE — 99213 OFFICE O/P EST LOW 20 MIN: CPT | Mod: 95 | Performed by: PHYSICIAN ASSISTANT

## 2024-08-27 ASSESSMENT — SLEEP AND FATIGUE QUESTIONNAIRES
HOW LIKELY ARE YOU TO NOD OFF OR FALL ASLEEP WHILE SITTING AND TALKING TO SOMEONE: WOULD NEVER DOZE
HOW LIKELY ARE YOU TO NOD OFF OR FALL ASLEEP WHILE SITTING AND READING: WOULD NEVER DOZE
HOW LIKELY ARE YOU TO NOD OFF OR FALL ASLEEP WHILE LYING DOWN TO REST IN THE AFTERNOON WHEN CIRCUMSTANCES PERMIT: MODERATE CHANCE OF DOZING
HOW LIKELY ARE YOU TO NOD OFF OR FALL ASLEEP WHILE SITTING QUIETLY AFTER LUNCH WITHOUT ALCOHOL: WOULD NEVER DOZE
HOW LIKELY ARE YOU TO NOD OFF OR FALL ASLEEP WHEN YOU ARE A PASSENGER IN A CAR FOR AN HOUR WITHOUT A BREAK: WOULD NEVER DOZE
HOW LIKELY ARE YOU TO NOD OFF OR FALL ASLEEP WHILE WATCHING TV: WOULD NEVER DOZE
HOW LIKELY ARE YOU TO NOD OFF OR FALL ASLEEP WHILE SITTING INACTIVE IN A PUBLIC PLACE: WOULD NEVER DOZE
HOW LIKELY ARE YOU TO NOD OFF OR FALL ASLEEP IN A CAR, WHILE STOPPED FOR A FEW MINUTES IN TRAFFIC: WOULD NEVER DOZE

## 2024-08-27 ASSESSMENT — PATIENT HEALTH QUESTIONNAIRE - PHQ9: SUM OF ALL RESPONSES TO PHQ QUESTIONS 1-9: 12

## 2024-08-27 ASSESSMENT — PAIN SCALES - GENERAL: PAINLEVEL: NO PAIN (0)

## 2024-08-27 NOTE — NURSING NOTE
Depression Response    Patient completed the PHQ-9 assessment for depression and scored >9? Yes  Question 9 on the PHQ-9 was positive for suicidality? Yes  Does patient have current mental health provider? Yes    Is this a virtual visit? Yes   Does patient have suicidal ideation (positive question 9)? No - offer to place Mental Health Referral.  Patient declined referral/not needed    I personally notified the following: visit provider                  Current patient location:  outside of [facility]     Is the patient currently in the state [facility]? YES    Visit mode:VIDEO    If the visit is dropped, the patient can be reconnected by: VIDEO VISIT: Text to cell phone:   Telephone Information:   Mobile [phone]       Will anyone else be joining the visit? NO  (If patient encounters technical issues they should call [phone])    How would you like to obtain your AVS? MyChart    Are changes needed to the allergy or medication list? Pt stated no med changes    Are refills needed on medications prescribed by this physician? NO    Rooming Documentation:  Questionnaire(s) completed      Reason for visit: RECHECK    Bernarda GAR

## 2024-09-23 DIAGNOSIS — M54.42 LOW BACK PAIN WITH LEFT-SIDED SCIATICA, UNSPECIFIED BACK PAIN LATERALITY, UNSPECIFIED CHRONICITY: ICD-10-CM

## 2024-09-23 RX ORDER — CYCLOBENZAPRINE HCL 10 MG
10 TABLET ORAL 3 TIMES DAILY PRN
Qty: 90 TABLET | Refills: 1 | Status: SHIPPED | OUTPATIENT
Start: 2024-09-23

## 2024-11-11 ENCOUNTER — PATIENT OUTREACH (OUTPATIENT)
Dept: INTERNAL MEDICINE | Facility: CLINIC | Age: 34
End: 2024-11-11
Payer: COMMERCIAL

## 2024-11-11 NOTE — TELEPHONE ENCOUNTER
Patient Quality Outreach    Patient is due for the following:   Asthma  -  ACT needed  Hypertension -  Hypertension follow-up visit  Cervical Cancer Screening - PAP Needed  Physical Preventive Adult Physical    Next Steps:   Schedule a office visit for BP. Asthma, pap and Adult Preventative    Type of outreach:    Phone, left message for patient/parent to call back.   11/8/2021  1:00 PM 11/8/2021  2:00 PM 3/10/2024  6:33 AM 3/10/2024  8:45 AM 3/10/2024  10:30 AM 3/10/2024  10:32 AM 3/10/2024  10:33 AM   Vital Signs          Systolic 150 !  146 !  175 !  141 !  152 !      Diastolic 82 !  89 !  80 !  79 !  99 (H)         3/10/2024  10:34 AM 3/10/2024  12:30 PM   Vital Signs     Systolic  154 !    Diastolic  100 (H)       Legend:  ! Abnormal  (H) High    Questions for provider review:    None           Mildred Varela LPN  Chart routed to none.

## 2024-11-23 DIAGNOSIS — M54.42 LOW BACK PAIN WITH LEFT-SIDED SCIATICA, UNSPECIFIED BACK PAIN LATERALITY, UNSPECIFIED CHRONICITY: ICD-10-CM

## 2024-11-25 RX ORDER — CYCLOBENZAPRINE HCL 10 MG
10 TABLET ORAL 3 TIMES DAILY PRN
Qty: 90 TABLET | Refills: 1 | Status: SHIPPED | OUTPATIENT
Start: 2024-11-25

## 2025-01-27 SDOH — HEALTH STABILITY: PHYSICAL HEALTH: ON AVERAGE, HOW MANY DAYS PER WEEK DO YOU ENGAGE IN MODERATE TO STRENUOUS EXERCISE (LIKE A BRISK WALK)?: 2 DAYS

## 2025-01-27 SDOH — HEALTH STABILITY: PHYSICAL HEALTH: ON AVERAGE, HOW MANY MINUTES DO YOU ENGAGE IN EXERCISE AT THIS LEVEL?: 10 MIN

## 2025-01-27 ASSESSMENT — SOCIAL DETERMINANTS OF HEALTH (SDOH): HOW OFTEN DO YOU GET TOGETHER WITH FRIENDS OR RELATIVES?: ONCE A WEEK

## 2025-01-28 ASSESSMENT — PATIENT HEALTH QUESTIONNAIRE - PHQ9: SUM OF ALL RESPONSES TO PHQ QUESTIONS 1-9: 13

## 2025-01-28 ASSESSMENT — ANXIETY QUESTIONNAIRES
5. BEING SO RESTLESS THAT IT IS HARD TO SIT STILL: MORE THAN HALF THE DAYS
GAD7 TOTAL SCORE: 13
6. BECOMING EASILY ANNOYED OR IRRITABLE: SEVERAL DAYS
4. TROUBLE RELAXING: MORE THAN HALF THE DAYS
1. FEELING NERVOUS, ANXIOUS, OR ON EDGE: MORE THAN HALF THE DAYS
2. NOT BEING ABLE TO STOP OR CONTROL WORRYING: MORE THAN HALF THE DAYS
IF YOU CHECKED OFF ANY PROBLEMS ON THIS QUESTIONNAIRE, HOW DIFFICULT HAVE THESE PROBLEMS MADE IT FOR YOU TO DO YOUR WORK, TAKE CARE OF THINGS AT HOME, OR GET ALONG WITH OTHER PEOPLE: VERY DIFFICULT
3. WORRYING TOO MUCH ABOUT DIFFERENT THINGS: MORE THAN HALF THE DAYS
7. FEELING AFRAID AS IF SOMETHING AWFUL MIGHT HAPPEN: MORE THAN HALF THE DAYS

## 2025-02-27 ENCOUNTER — E-VISIT (OUTPATIENT)
Dept: URGENT CARE | Facility: CLINIC | Age: 35
End: 2025-02-27
Payer: COMMERCIAL

## 2025-02-27 DIAGNOSIS — R21 RASH: Primary | ICD-10-CM

## 2025-02-27 NOTE — PATIENT INSTRUCTIONS
Dear Olga Sheehan,    We are sorry you are not feeling well. Based on the responses you provided, it is recommended that you be seen in-person in urgent care so we can better evaluate your symptoms. Please click here to find the nearest urgent care location to you.   You will not be charged for this Visit. Thank you for trusting us with your care.    NIALL RANGEL CNP

## 2025-03-13 ENCOUNTER — OFFICE VISIT (OUTPATIENT)
Dept: INTERNAL MEDICINE | Facility: CLINIC | Age: 35
End: 2025-03-13
Payer: COMMERCIAL

## 2025-03-13 VITALS
OXYGEN SATURATION: 98 % | BODY MASS INDEX: 45.99 KG/M2 | DIASTOLIC BLOOD PRESSURE: 86 MMHG | HEART RATE: 90 BPM | WEIGHT: 293 LBS | SYSTOLIC BLOOD PRESSURE: 132 MMHG | TEMPERATURE: 97.8 F | HEIGHT: 67 IN | RESPIRATION RATE: 20 BRPM

## 2025-03-13 DIAGNOSIS — N92.0 MENORRHAGIA WITH REGULAR CYCLE: ICD-10-CM

## 2025-03-13 DIAGNOSIS — E66.01 MORBID OBESITY (H): ICD-10-CM

## 2025-03-13 DIAGNOSIS — Z12.4 CERVICAL CANCER SCREENING: ICD-10-CM

## 2025-03-13 DIAGNOSIS — J45.20 MILD INTERMITTENT ASTHMA WITHOUT COMPLICATION: ICD-10-CM

## 2025-03-13 DIAGNOSIS — Z51.81 ENCOUNTER FOR THERAPEUTIC DRUG MONITORING: ICD-10-CM

## 2025-03-13 DIAGNOSIS — G43.809 OTHER MIGRAINE WITHOUT STATUS MIGRAINOSUS, NOT INTRACTABLE: ICD-10-CM

## 2025-03-13 DIAGNOSIS — L03.116 CELLULITIS OF LEFT LEG: ICD-10-CM

## 2025-03-13 DIAGNOSIS — F31.32 MODERATE DEPRESSED BIPOLAR I DISORDER (H): ICD-10-CM

## 2025-03-13 DIAGNOSIS — Z00.00 ROUTINE GENERAL MEDICAL EXAMINATION AT A HEALTH CARE FACILITY: Primary | ICD-10-CM

## 2025-03-13 RX ORDER — RIZATRIPTAN BENZOATE 5 MG/1
5 TABLET ORAL
Qty: 18 TABLET | Refills: 1 | Status: SHIPPED | OUTPATIENT
Start: 2025-03-13

## 2025-03-13 RX ORDER — DOXYCYCLINE 100 MG/1
100 CAPSULE ORAL 2 TIMES DAILY
Qty: 20 CAPSULE | Refills: 0 | Status: SHIPPED | OUTPATIENT
Start: 2025-03-13

## 2025-03-13 RX ORDER — ALBUTEROL SULFATE 90 UG/1
2 INHALANT RESPIRATORY (INHALATION) EVERY 6 HOURS
Qty: 18 G | Refills: 11 | Status: SHIPPED | OUTPATIENT
Start: 2025-03-13

## 2025-03-13 RX ORDER — METAXALONE 800 MG/1
1 TABLET ORAL
COMMUNITY
Start: 2025-02-12

## 2025-03-13 SDOH — HEALTH STABILITY: PHYSICAL HEALTH: ON AVERAGE, HOW MANY MINUTES DO YOU ENGAGE IN EXERCISE AT THIS LEVEL?: 10 MIN

## 2025-03-13 SDOH — HEALTH STABILITY: PHYSICAL HEALTH: ON AVERAGE, HOW MANY DAYS PER WEEK DO YOU ENGAGE IN MODERATE TO STRENUOUS EXERCISE (LIKE A BRISK WALK)?: 2 DAYS

## 2025-03-13 ASSESSMENT — ANXIETY QUESTIONNAIRES
5. BEING SO RESTLESS THAT IT IS HARD TO SIT STILL: MORE THAN HALF THE DAYS
7. FEELING AFRAID AS IF SOMETHING AWFUL MIGHT HAPPEN: MORE THAN HALF THE DAYS
GAD7 TOTAL SCORE: 13
3. WORRYING TOO MUCH ABOUT DIFFERENT THINGS: MORE THAN HALF THE DAYS
7. FEELING AFRAID AS IF SOMETHING AWFUL MIGHT HAPPEN: MORE THAN HALF THE DAYS
GAD7 TOTAL SCORE: 13
2. NOT BEING ABLE TO STOP OR CONTROL WORRYING: MORE THAN HALF THE DAYS
IF YOU CHECKED OFF ANY PROBLEMS ON THIS QUESTIONNAIRE, HOW DIFFICULT HAVE THESE PROBLEMS MADE IT FOR YOU TO DO YOUR WORK, TAKE CARE OF THINGS AT HOME, OR GET ALONG WITH OTHER PEOPLE: VERY DIFFICULT
1. FEELING NERVOUS, ANXIOUS, OR ON EDGE: MORE THAN HALF THE DAYS
GAD7 TOTAL SCORE: 13
4. TROUBLE RELAXING: MORE THAN HALF THE DAYS
8. IF YOU CHECKED OFF ANY PROBLEMS, HOW DIFFICULT HAVE THESE MADE IT FOR YOU TO DO YOUR WORK, TAKE CARE OF THINGS AT HOME, OR GET ALONG WITH OTHER PEOPLE?: VERY DIFFICULT
6. BECOMING EASILY ANNOYED OR IRRITABLE: SEVERAL DAYS

## 2025-03-13 ASSESSMENT — PATIENT HEALTH QUESTIONNAIRE - PHQ9
10. IF YOU CHECKED OFF ANY PROBLEMS, HOW DIFFICULT HAVE THESE PROBLEMS MADE IT FOR YOU TO DO YOUR WORK, TAKE CARE OF THINGS AT HOME, OR GET ALONG WITH OTHER PEOPLE: SOMEWHAT DIFFICULT
SUM OF ALL RESPONSES TO PHQ QUESTIONS 1-9: 13
SUM OF ALL RESPONSES TO PHQ QUESTIONS 1-9: 13

## 2025-03-13 ASSESSMENT — SOCIAL DETERMINANTS OF HEALTH (SDOH): HOW OFTEN DO YOU GET TOGETHER WITH FRIENDS OR RELATIVES?: ONCE A WEEK

## 2025-03-13 NOTE — PROGRESS NOTES
Preventive Care Visit  [facility]  NIALL Bartlett CNP, Internal Medicine  Mar 13, 2025      Assessment & Plan     Routine general medical examination at a health care facility    - Lipid panel reflex to direct LDL Fasting; Future  - Comprehensive metabolic panel (BMP + Alb, Alk Phos, ALT, AST, Total. Bili, TP); Future  - TSH with free T4 reflex; Future  - CBC with platelets and differential; Future    Moderate depressed bipolar I disorder (H)  Sees psych Rebecca Brown NP at [facility]- does on line visit   [phone] phone   Will fax labs when back   - Comprehensive metabolic panel (BMP + Alb, Alk Phos, ALT, AST, Total. Bili, TP); Future  - TSH with free T4 reflex; Future  - CBC with platelets and differential; Future  - Lithium level; Future  - semaglutide-weight management (WEGOVY) 0.25 MG/0.5ML pen; Inject 0.5 mLs (0.25 mg) subcutaneously once a week.    Morbid obesity (H)  Her BMI is 75.81.  She is having back issues and has had recurrent cellulitis in her leg  She has a hard time getting dressed and moving around.  She is willing to see if she can start on a GLP-1 medication to help get her weight down for the above reasons.  At this weight she is at risk a lot of issues with heart blood pressure etc. hopefully we can get this covered and she will be able to lose some weight  - semaglutide-weight management (WEGOVY) 0.25 MG/0.5ML pen; Inject 0.5 mLs (0.25 mg) subcutaneously once a week.  - Hemoglobin A1c; Future    Cervical cancer screening  No previous abnormals  - HPV and Gynecologic Cytology Panel - Recommended Age 30 - 65 Years    Encounter for therapeutic drug monitoring  Needs to have lithium levels for her psych provider    Mild intermittent asthma without complication  Stable and rarely uses  - albuterol (PROAIR HFA/PROVENTIL HFA/VENTOLIN HFA) 108 (90 Base) MCG/ACT inhaler; Inhale 2 puffs into the lungs every 6 hours.    Other migraine without status  "migrainosus, not intractable  Rarely needs but works when needed  - rizatriptan (MAXALT) 5 MG tablet; Take 1 tablet (5 mg) by mouth at onset of headache for migraine. Take 1-2 tablets (5-10 mg) by mouth at onset of headache for migraine. May repeat in 2 hours. Max 6 tablets/24 hours ### DO NOT FILL NOW.  Please update patient's profile to reflect additional refills.  ####    Menorrhagia with regular cycle  Wants to consider having either a IUD or possibly a hysterectomy. She has no desire for children and has female partner so no birth control needed   Bleeding is hard on her   - Ob/Gyn  Referral; Future    Cellulitis of left leg  Not fully resolved   - doxycycline hyclate (VIBRAMYCIN) 100 MG capsule; Take 1 capsule (100 mg) by mouth 2 times daily.            BMI  Estimated body mass index is 75.81 kg/m  as calculated from the following:    Height as of this encounter: 1.702 m (5' 7\").    Weight as of this encounter: 219.5 kg (484 lb).   Weight management plan: wegovy to be started for weight loss      Depression Screening Follow Up        3/13/2025     6:44 AM   PHQ   PHQ-9 Total Score 13    Q9: Thoughts of better off dead/self-harm past 2 weeks Not at all       Patient-reported           Counseling  Appropriate preventive services were addressed with this patient via screening, questionnaire, or discussion as appropriate for fall prevention, nutrition, physical activity, Tobacco-use cessation, social engagement, weight loss and cognition.  Checklist reviewing preventive services available has been given to the patient.  Reviewed patient's diet, addressing concerns and/or questions.   Olga is at risk for lack of exercise and has been provided with information to increase physical activity for the benefit of Olga's well-being.   Olga is at risk for psychosocial distress and has been provided with information to reduce risk.   The patient's PHQ-9 score is consistent with moderate depression. Olga was " provided with information regarding depression.       Patient Instructions   Lab in [address]    OB GYN referral     Medication refilled     Will send lab to Rebecca Brown NP when back     Ozempic 0.25 mg weekly   Let me know if 3 weeks if tolerated and we can increase dose     Doxycyline twice daily for 10 days       Tarik Espinoza is a 35 year old, presenting for the following:  Physical, RECHECK, and Urgent Care        3/13/2025     7:51 AM   Additional Questions   Roomed by DANA Varela LPN   Accompanied by self         3/13/2025     7:51 AM   Patient Reported Additional Medications   Patient reports taking the following new medications none          HPI  Fasting     Seeing I spine for back   L3-4-5  Working towards spine stimulator       Advance Care Planning  Patient does not have a Health Care Directive: Discussed advance care planning with patient; however, patient declined at this time.      3/13/2025   General Health   How would you rate your overall physical health? (!) POOR   Feel stress (tense, anxious, or unable to sleep) Very much   (!) STRESS CONCERN      3/13/2025   Nutrition   Three or more servings of calcium each day? Yes   Diet: Regular (no restrictions)   How many servings of fruit and vegetables per day? (!) 0-1   How many sweetened beverages each day? (!) 2         3/13/2025   Exercise   Days per week of moderate/strenous exercise 2 days   Average minutes spent exercising at this level 10 min   (!) EXERCISE CONCERN      3/13/2025   Social Factors   Frequency of gathering with friends or relatives Once a week   Worry food won't last until get money to buy more No   Food not last or not have enough money for food? No   Do you have housing? (Housing is defined as stable permanent housing and does not include staying ouside in a car, in a tent, in an abandoned building, in an overnight shelter, or couch-surfing.) Yes   Are you worried about losing your housing? No   Lack of transportation? No  "  Unable to get utilities (heat,electricity)? No         3/13/2025   Dental   Dentist two times every year? Yes           2024   TB Screening   Were you born outside of the [place]? No         Today's PHQ-9 Score:       3/13/2025     6:44 AM   PHQ-9 SCORE   PHQ-9 Total Score MyChart 13 (Moderate depression)   PHQ-9 Total Score 13        Patient-reported         3/13/2025   Substance Use   Alcohol more than 3/day or more than 7/wk No   Do you use any other substances recreationally? No     Social History     Tobacco Use    Smoking status: Former     Current packs/day: 0.00     Average packs/day: 1 pack/day for 8.0 years (8.0 ttl pk-yrs)     Types: Other, Cigarettes     Start date: 2012     Quit date: 2020     Years since quittin.0    Smokeless tobacco: Never    Tobacco comments:     quit vaping and smoking 2020   Vaping Use    Vaping status: Never Used   Substance Use Topics    Alcohol use: Yes     Comment: 1-3 drinks/month    Drug use: Yes     Types: Marijuana     Comment: occasional, 2-3 times per month                  3/13/2025   STI Screening   New sexual partner(s) since last STI/HIV test? No     History of abnormal Pap smear: no abnormal pap         3/27/2017    12:00 AM   PAP / HPV   PAP-ABSTRACT See Scanned Document           This result is from an external source.           3/13/2025   Contraception/Family Planning   Questions about contraception or family planning (!) YES         Reviewed and updated as needed this visit by Provider    Allergies                 Lab work is in process      Review of Systems  Constitutional, neuro, ENT, endocrine, pulmonary, cardiac, gastrointestinal, genitourinary, musculoskeletal, integument and psychiatric systems are negative, except as otherwise noted.     Objective    Exam  /86   Pulse 90   Temp 97.8  F (36.6  C) (Oral)   Resp 20   Ht 1.702 m (5' 7\")   Wt (!) 219.5 kg (484 lb)   LMP 2025   SpO2 98%   Breastfeeding No   BMI 75.81 " "kg/m     Estimated body mass index is 75.81 kg/m  as calculated from the following:    Height as of this encounter: 1.702 m (5' 7\").    Weight as of this encounter: 219.5 kg (484 lb).    Physical Exam  GENERAL: alert and no distress. Obese   EYES: Eyes grossly normal to inspection,  and conjunctivae and sclerae normal  HENT: ear canals and TM's normal, nose and mouth without ulcers or lesions  NECK: no adenopathy, no asymmetry, masses, or scars  RESP: lungs clear to auscultation - no rales, rhonchi or wheezes  CV: regular rate and rhythm, normal S1 S2, no S3 or S4, no murmur, click or rub, no peripheral edema  ABDOMEN: soft, nontender, and bowel sounds normal- exam limited by body habitus   MS: no gross musculoskeletal defects noted, lower leg edema   Left lower leg slight pink and feels tight and painful    SKIN: no suspicious lesions or rashes  NEURO: Normal strength and tone, mentation intact and speech normal  PSYCH: mentation appears normal, affect normal/bright        Signed Electronically by: NIALL Bartlett CNP    Answers submitted by the patient for this visit:  Patient Health Questionnaire (Submitted on 3/13/2025)  If you checked off any problems, how difficult have these problems made it for you to do your work, take care of things at home, or get along with other people?: Somewhat difficult  PHQ9 TOTAL SCORE: 13  Patient Health Questionnaire (G7) (Submitted on 3/13/2025)  LATOSHA 7 TOTAL SCORE: 13    "

## 2025-03-13 NOTE — PATIENT INSTRUCTIONS
Lab in [address]    OB GYN referral     Medication refilled     Will send lab to Rebecca Brown NP when back     Ozempic 0.25 mg weekly   Let me know if 3 weeks if tolerated and we can increase dose     Doxycyline twice daily for 10 days

## 2025-03-13 NOTE — PROGRESS NOTES
"  {PROVIDER CHARTING PREFERENCE:637991}    Tarik Espinoza is a 35 year old, presenting for the following health issues:  Physical, RECHECK, and Urgent Care      3/13/2025     7:51 AM   Additional Questions   Roomed by DANA Varela LPN   Accompanied by self         3/13/2025     7:51 AM   Patient Reported Additional Medications   Patient reports taking the following new medications none     HPI      L3-4-5   Seeing I spine   May get a pain stimulator in future     ED/UC Followup:    Facility: [facility]   Date of visit: 1-  Reason for visit: left lower leg cellulitis  Current Status: ***    {ROS Picklists (Optional):060373}      Objective    /86   Pulse 90   Temp 97.8  F (36.6  C) (Oral)   Resp 20   Ht 1.702 m (5' 7\")   Wt (!) 219.5 kg (484 lb)   LMP 02/20/2025   SpO2 98%   Breastfeeding No   BMI 75.81 kg/m    Body mass index is 75.81 kg/m .  Physical Exam   {Exam List (Optional):256867}    {Diagnostic Test Results (Optional):544757}        Signed Electronically by: NIALL Bartlett CNP  {Email feedback regarding this note to [email or web address]   :[id number]}  Answers submitted by the patient for this visit:  Patient Health Questionnaire (Submitted on 3/13/2025)  If you checked off any problems, how difficult have these problems made it for you to do your work, take care of things at home, or get along with other people?: Somewhat difficult  PHQ9 TOTAL SCORE: 13  Patient Health Questionnaire (G7) (Submitted on 3/13/2025)  LATOSHA 7 TOTAL SCORE: 13    " show

## 2025-03-16 LAB
HPV HR 12 DNA CVX QL NAA+PROBE: NEGATIVE
HPV16 DNA CVX QL NAA+PROBE: NEGATIVE
HPV18 DNA CVX QL NAA+PROBE: NEGATIVE
HUMAN PAPILLOMA VIRUS FINAL DIAGNOSIS: NORMAL

## 2025-03-17 ENCOUNTER — TELEPHONE (OUTPATIENT)
Dept: INTERNAL MEDICINE | Facility: CLINIC | Age: 35
End: 2025-03-17
Payer: COMMERCIAL

## 2025-03-17 NOTE — TELEPHONE ENCOUNTER
Retail Pharmacy Prior Authorization Team   Phone: [phone]    [facility] [place] - [id number]

## 2025-03-18 ENCOUNTER — TELEPHONE (OUTPATIENT)
Dept: INTERNAL MEDICINE | Facility: CLINIC | Age: 35
End: 2025-03-18
Payer: COMMERCIAL

## 2025-03-19 LAB
BKR AP ASSOCIATED HPV REPORT: NORMAL
BKR LAB AP GYN ADEQUACY: NORMAL
BKR LAB AP GYN INTERPRETATION: NORMAL
BKR LAB AP LMP: NORMAL
BKR LAB AP PREVIOUS ABNORMAL: NORMAL
PATH REPORT.COMMENTS IMP SPEC: NORMAL
PATH REPORT.COMMENTS IMP SPEC: NORMAL
PATH REPORT.RELEVANT HX SPEC: NORMAL

## 2025-03-19 NOTE — TELEPHONE ENCOUNTER
PRIOR AUTHORIZATION DENIED    Medication: semaglutide-weight management (WEGOVY) 0.25 MG/0.5ML pen    Denial Date: 3/19/2025    Denial Rational:  Per insurance, medication is excluded from patient's benefit plan and will not be covered. Patient is able to use [employer], [employer] or another discount card to help with the cost of the medication. An appeal is NOT available on excluded medications. Please follow up with the patient and close encounter.   Thank You.                 Appeal Information:  N/A

## 2025-03-19 NOTE — TELEPHONE ENCOUNTER
Please let patient know this is not a covered medication  I think she would benefit from a medication like this but it says we can even do any fighting to get it.  It can be gotten online or from other places.  I am not sure if that something she is interested in

## 2025-03-19 NOTE — TELEPHONE ENCOUNTER
Patient informed of provider's message below.  States with GoodRx the cost would be $1300.    This RN gave her the phone number to [facility] pharmacy to check their prices.  If doable, she will call back to have prescription sent there.

## 2025-03-19 NOTE — TELEPHONE ENCOUNTER
Central Prior Authorization Team   Phone: [phone]    PA Initiation    Medication: semaglutide-weight management (WEGOVY) 0.25 MG/0.5ML pen  Insurance Company: [employer] ([facility]) - Phone [phone] Fax [phone]  Pharmacy Filling the Rx: -[facility] [id number]  [place] - [address]  Filling Pharmacy Phone: [phone]  Filling Pharmacy Fax:    Start Date: 3/19/2025    [facility] KEY: [id number]

## 2025-03-29 ENCOUNTER — OFFICE VISIT (OUTPATIENT)
Dept: URGENT CARE | Facility: URGENT CARE | Age: 35
End: 2025-03-29
Payer: COMMERCIAL

## 2025-03-29 ENCOUNTER — HOSPITAL ENCOUNTER (EMERGENCY)
Facility: CLINIC | Age: 35
Discharge: HOME | End: 2025-03-29
Attending: EMERGENCY MEDICINE | Admitting: EMERGENCY MEDICINE
Payer: COMMERCIAL

## 2025-03-29 ENCOUNTER — APPOINTMENT (OUTPATIENT)
Dept: ULTRASOUND IMAGING | Facility: CLINIC | Age: 35
End: 2025-03-29
Attending: EMERGENCY MEDICINE
Payer: COMMERCIAL

## 2025-03-29 VITALS
BODY MASS INDEX: 45.99 KG/M2 | HEART RATE: 84 BPM | HEIGHT: 67 IN | SYSTOLIC BLOOD PRESSURE: 156 MMHG | RESPIRATION RATE: 24 BRPM | TEMPERATURE: 96.4 F | DIASTOLIC BLOOD PRESSURE: 77 MMHG | OXYGEN SATURATION: 98 % | WEIGHT: 293 LBS

## 2025-03-29 VITALS
WEIGHT: 293 LBS | SYSTOLIC BLOOD PRESSURE: 134 MMHG | RESPIRATION RATE: 19 BRPM | TEMPERATURE: 98.3 F | BODY MASS INDEX: 75.49 KG/M2 | DIASTOLIC BLOOD PRESSURE: 75 MMHG | OXYGEN SATURATION: 96 % | HEART RATE: 92 BPM

## 2025-03-29 DIAGNOSIS — I89.0 LYMPHEDEMA: ICD-10-CM

## 2025-03-29 DIAGNOSIS — F31.32 MODERATE DEPRESSED BIPOLAR I DISORDER (H): ICD-10-CM

## 2025-03-29 DIAGNOSIS — M79.662 PAIN IN BOTH LOWER LEGS: ICD-10-CM

## 2025-03-29 DIAGNOSIS — R79.89 ELEVATED D-DIMER: ICD-10-CM

## 2025-03-29 DIAGNOSIS — R60.0 BILATERAL LOWER EXTREMITY EDEMA: Primary | ICD-10-CM

## 2025-03-29 DIAGNOSIS — M79.661 PAIN IN BOTH LOWER LEGS: ICD-10-CM

## 2025-03-29 LAB
ALBUMIN SERPL-MCNC: 3.3 G/DL (ref 3.4–5)
ALP SERPL-CCNC: 63 U/L (ref 40–150)
ALT SERPL W P-5'-P-CCNC: 30 U/L (ref 0–70)
ANION GAP SERPL CALCULATED.3IONS-SCNC: <1 MMOL/L (ref 3–14)
AST SERPL W P-5'-P-CCNC: 31 U/L (ref 0–45)
BASOPHILS # BLD AUTO: 0 10E3/UL (ref 0–0.2)
BASOPHILS NFR BLD AUTO: 0 %
BILIRUB SERPL-MCNC: 0.6 MG/DL (ref 0.2–1.3)
BUN SERPL-MCNC: 8 MG/DL (ref 7–30)
CALCIUM SERPL-MCNC: 8.8 MG/DL (ref 8.5–10.1)
CHLORIDE BLD-SCNC: 112 MMOL/L (ref 94–109)
CO2 SERPL-SCNC: 28 MMOL/L (ref 20–32)
CREAT SERPL-MCNC: 0.8 MG/DL (ref 0.52–1.25)
D DIMER PPP FEU-MCNC: 0.66 UG/ML FEU (ref 0–0.5)
EGFRCR SERPLBLD CKD-EPI 2021: >90 ML/MIN/1.73M2
EOSINOPHIL # BLD AUTO: 0 10E3/UL (ref 0–0.7)
EOSINOPHIL NFR BLD AUTO: 0 %
ERYTHROCYTE [DISTWIDTH] IN BLOOD BY AUTOMATED COUNT: 16 % (ref 10–15)
GLUCOSE BLD-MCNC: 142 MG/DL (ref 70–99)
HCT VFR BLD AUTO: 35.1 % (ref 35–53)
HGB BLD-MCNC: 11 G/DL (ref 11.7–17.7)
IMM GRANULOCYTES # BLD: 0.1 10E3/UL
IMM GRANULOCYTES NFR BLD: 1 %
LYMPHOCYTES # BLD AUTO: 1.7 10E3/UL (ref 0.8–5.3)
LYMPHOCYTES NFR BLD AUTO: 21 %
MCH RBC QN AUTO: 25.9 PG (ref 26.5–33)
MCHC RBC AUTO-ENTMCNC: 31.3 G/DL (ref 31.5–36.5)
MCV RBC AUTO: 83 FL (ref 78–100)
MONOCYTES # BLD AUTO: 0.6 10E3/UL (ref 0–1.3)
MONOCYTES NFR BLD AUTO: 7 %
NEUTROPHILS # BLD AUTO: 5.9 10E3/UL (ref 1.6–8.3)
NEUTROPHILS NFR BLD AUTO: 71 %
PLATELET # BLD AUTO: 320 10E3/UL (ref 150–450)
POTASSIUM BLD-SCNC: 4.2 MMOL/L (ref 3.4–5.3)
PROT SERPL-MCNC: 7.2 G/DL (ref 6.8–8.8)
RBC # BLD AUTO: 4.24 10E6/UL (ref 3.8–5.9)
SODIUM SERPL-SCNC: 140 MMOL/L (ref 135–145)
WBC # BLD AUTO: 8.3 10E3/UL (ref 4–11)

## 2025-03-29 PROCEDURE — 93970 EXTREMITY STUDY: CPT

## 2025-03-29 PROCEDURE — 3075F SYST BP GE 130 - 139MM HG: CPT | Performed by: NURSE PRACTITIONER

## 2025-03-29 PROCEDURE — 80053 COMPREHEN METABOLIC PANEL: CPT | Performed by: NURSE PRACTITIONER

## 2025-03-29 PROCEDURE — 3078F DIAST BP <80 MM HG: CPT | Performed by: NURSE PRACTITIONER

## 2025-03-29 PROCEDURE — 85379 FIBRIN DEGRADATION QUANT: CPT | Performed by: NURSE PRACTITIONER

## 2025-03-29 PROCEDURE — 99284 EMERGENCY DEPT VISIT MOD MDM: CPT | Mod: 25

## 2025-03-29 PROCEDURE — 99215 OFFICE O/P EST HI 40 MIN: CPT | Performed by: NURSE PRACTITIONER

## 2025-03-29 PROCEDURE — 85025 COMPLETE CBC W/AUTO DIFF WBC: CPT | Performed by: NURSE PRACTITIONER

## 2025-03-29 PROCEDURE — 36415 COLL VENOUS BLD VENIPUNCTURE: CPT | Performed by: NURSE PRACTITIONER

## 2025-03-29 ASSESSMENT — ACTIVITIES OF DAILY LIVING (ADL)
ADLS_ACUITY_SCORE: 46
ADLS_ACUITY_SCORE: 46

## 2025-03-29 ASSESSMENT — COLUMBIA-SUICIDE SEVERITY RATING SCALE - C-SSRS
6. HAVE YOU EVER DONE ANYTHING, STARTED TO DO ANYTHING, OR PREPARED TO DO ANYTHING TO END YOUR LIFE?: NO
2. HAVE YOU ACTUALLY HAD ANY THOUGHTS OF KILLING YOURSELF IN THE PAST MONTH?: NO
1. IN THE PAST MONTH, HAVE YOU WISHED YOU WERE DEAD OR WISHED YOU COULD GO TO SLEEP AND NOT WAKE UP?: NO

## 2025-03-29 NOTE — PROGRESS NOTES
Chief Complaint   Patient presents with    Leg Problem     Ongoing cellulitis on both legs. Have had two doses of doxycycline in the last few months, but doesn't seem to be clearing it out.          ICD-10-CM    1. Bilateral lower extremity edema  R60.0 CBC with platelets and differential     D dimer quantitative     CBC with platelets and differential      2. Moderate depressed bipolar I disorder (H)  F31.32 Comprehensive metabolic panel (BMP + Alb, Alk Phos, ALT, AST, Total. Bili, TP)     CANCELED: Comprehensive metabolic panel (BMP + Alb, Alk Phos, ALT, AST, Total. Bili, TP)      3. Elevated d-dimer  R79.89       Patient is referred to [facility] emergency room for ultrasound and evaluation to rule out deep vein thrombosis.  Partner will drive her to the hospital.  Called [facility] inform them of patient's impending arrival.    Red flag warning signs and when to go to the emergency room discussed.  Reviewed potential adverse reactions to medications.    Results for orders placed or performed in visit on 03/29/25 (from the past 24 hours)   D dimer quantitative   Result Value Ref Range    D-Dimer Quantitative 0.66 (H) 0.00 - 0.50 ug/mL FEU    Narrative    This D-dimer assay is intended for use in conjunction with a clinical pretest probability assessment model to exclude pulmonary embolism (PE) and deep venous thrombosis (DVT) in outpatients suspected of PE or DVT. The cut-off value is 0.50 ug/mL FEU.   Comprehensive metabolic panel (BMP + Alb, Alk Phos, ALT, AST, Total. Bili, TP)   Result Value Ref Range    Sodium 140 135 - 145 mmol/L    Potassium 4.2 3.4 - 5.3 mmol/L    Chloride 112 (H) 94 - 109 mmol/L    Carbon Dioxide (CO2) 28 20 - 32 mmol/L    Anion Gap <1 (L) 3 - 14 mmol/L    Urea Nitrogen 8 7 - 30 mg/dL    Creatinine 0.80 0.52 - 1.25 mg/dL    GFR Estimate >90 >60 mL/min/1.73m2    Calcium 8.8 8.5 - 10.1 mg/dL    Glucose 142 (H) 70 - 99 mg/dL    Alkaline Phosphatase 63 40 - 150 U/L    AST 31 0 - 45  "U/L    ALT 30 0 - 70 U/L    Protein Total 7.2 6.8 - 8.8 g/dL    Albumin 3.3 (L) 3.4 - 5.0 g/dL    Bilirubin Total 0.6 0.2 - 1.3 mg/dL    Narrative    The generation of reference intervals for this test is currently based on binary male or female sex. If the electronic health record information indicates another gender identity or if Legal Sex is recorded as \"Unknown\", both male and female reference intervals are provided where applicable, and should be considered according to the individual's appropriate clinical context.   CBC with platelets and differential    Narrative    The following orders were created for panel order CBC with platelets and differential.  Procedure                               Abnormality         Status                     ---------                               -----------         ------                     CBC with platelets and ...[[id number]]  Abnormal            Final result                 Please view results for these tests on the individual orders.   CBC with platelets and differential   Result Value Ref Range    WBC Count 8.3 4.0 - 11.0 10e3/uL    RBC Count 4.24 3.80 - 5.90 10e6/uL    Hemoglobin 11.0 (L) 11.7 - 17.7 g/dL    Hematocrit 35.1 35.0 - 53.0 %    MCV 83 78 - 100 fL    MCH 25.9 (L) 26.5 - 33.0 pg    MCHC 31.3 (L) 31.5 - 36.5 g/dL    RDW 16.0 (H) 10.0 - 15.0 %    Platelet Count 320 150 - 450 10e3/uL    % Neutrophils 71 %    % Lymphocytes 21 %    % Monocytes 7 %    % Eosinophils 0 %    % Basophils 0 %    % Immature Granulocytes 1 %    Absolute Neutrophils 5.9 1.6 - 8.3 10e3/uL    Absolute Lymphocytes 1.7 0.8 - 5.3 10e3/uL    Absolute Monocytes 0.6 0.0 - 1.3 10e3/uL    Absolute Eosinophils 0.0 0.0 - 0.7 10e3/uL    Absolute Basophils 0.0 0.0 - 0.2 10e3/uL    Absolute Immature Granulocytes 0.1 <=0.4 10e3/uL    Narrative    The generation of reference intervals for this test is currently based on binary male or female sex. If the electronic health record information indicates " "another gender identity or if Legal Sex is recorded as \"Unknown\", both male and female reference intervals are provided where applicable, and should be considered according to the individual's appropriate clinical context.       Subjective     Olga Sheehan is an 35 year old adult who presents to clinic today for bilateral lower extremity edema, left leg is worse than the right.  She has been treated for cellulitis in the lower extremities twice, first was treated on January 31 with a 10-day course of doxycycline and then was treated again on March 13 with another 10 days of doxycycline.  She never felt it cleared in either case.  She denies any fever or chills, legs have not felt hot.  There is been no trauma to her legs.  She is fairly sedentary.      Objective    /75 (BP Location: Left arm, Patient Position: Sitting, Cuff Size: Adult Large)   Pulse 92   Temp 98.3  F (36.8  C) (Oral)   Resp 19   Wt (!) 218.6 kg (482 lb)   LMP 02/20/2025   SpO2 96%   BMI 75.49 kg/m    Nurses notes and VS have been reviewed.    Physical Exam       GENERAL APPEARANCE: alert and morbidly obese     RESP: lungs clear to auscultation - no rales, rhonchi or wheezes     CV: regular rates and rhythm, no murmurs, rubs, or gallop     MS: extremities normal- no gross deformities noted; normal muscle tone.  Edema of both lower extremities from the knee to the foot, greater on the left side, mild erythema noted on the left and perhaps beginning on the front of the right shin, left calf is tender to palpation     SKIN: Erythema as noted under musculoskeletal, no obvious breaks in skin that is exposed     PSYCH: normal thought process; no significant mood disturbance      NIALL Payan, CNP  [facility] Provider    The use of Dragon/[employer] dictation services may have been used to construct the content in this note; any grammatical or spelling errors are non-intentional. Please contact the author of this note " directly if you are in need of any clarification.

## 2025-03-30 NOTE — ED PROVIDER NOTES
Emergency Department Note      History of Present Illness     Chief Complaint   Leg Swelling      HPI   Olga Sheehan is a 35 year old adult presenting to the emergency department from urgent care with concerns for bilateral lower extremity edema, worse on the left than the right.  She was sent from urgent care due to an elevated dimer.  She reports she was treated for cellulitis in the lower extremities twice, the first on January 31 with doxycycline and then again March 13 with doxycycline.  She denies any improvement of her symptoms.  Over the last 2 days, she has noticed more pain and swelling to both legs, worse in the left leg.  Pain is described as a burning sensation to the skin.  Denies any fever, chills.  Denies any chest pain or shortness of breath.  Reports tingling to the tops of her feet and to the level of the left mid tibia. Denies any history of blood clot, OCP use, recent long travel or surgery.    Independent Historian   None    Review of External Notes   I reviewed patient's UC visit today.  I reviewed the labs obtained from .    Past Medical History     Medical History and Problem List   Past Medical History:   Diagnosis Date    Abdominal pain     Anxiety and depression     Asthma     Bipolar affective disorder (H)     Depressive disorder     Obese     Sleep apnea        Medications   albuterol (PROAIR HFA/PROVENTIL HFA/VENTOLIN HFA) 108 (90 Base) MCG/ACT inhaler  buPROPion (WELLBUTRIN SR) 100 MG 12 hr tablet  fluticasone (FLONASE) 50 MCG/ACT nasal spray  gabapentin (NEURONTIN) 300 MG capsule  lithium (ESKALITH CR/LITHOBID) 450 MG CR tablet  metaxalone (SKELAXIN) 800 MG tablet  OLANZapine (ZYPREXA) 2.5 MG tablet  rizatriptan (MAXALT) 5 MG tablet  semaglutide-weight management (WEGOVY) 0.25 MG/0.5ML pen        Surgical History   Past Surgical History:   Procedure Laterality Date    ABDOMEN SURGERY  10/10/2019    Gallbladder removal    ENT SURGERY      wisdom    EXAM UNDER ANESTHESIA RECTUM  "N/A 11/21/2018    Procedure: EXAM UNDER ANESTHESIA;  Surgeon: Chanel Parker MD;  Location:  [facility]    INCISION AND DRAINAGE RECTUM, COMBINED N/A 06/28/2018    Procedure: COMBINED INCISION AND DRAINAGE RECTUM;  Incision and drainage of left ischiorectal fossa abscess;  Surgeon: Dave Davis MD;  Location: [facility] OR    LAPAROSCOPIC CHOLECYSTECTOMY N/A 10/10/2019    Procedure: LAPAROSCOPIC CHOLECYSTECTOMY;  Surgeon: Olga Adam MD;  Location: [facility] OR    PLACEMENT OF SETON RECTUM N/A 11/21/2018    Procedure: PLACEMENT OF SETON RECTUM;  Surgeon: Chanel Parker MD;  Location:  [facility]    RECTUM LIFT PROCEDURE RECTAL APPROACH N/A 03/28/2019    Procedure: Ligation of intersphincteric fistula tract;  Surgeon: Chanel Parker MD;  Location: [facility]    SURGICAL HISTORY OF -   11/2024    rizotomy       Physical Exam     Patient Vitals for the past 24 hrs:   BP Temp Temp src Pulse Resp SpO2 Height Weight   03/29/25 2000 (!) 147/86 -- -- 82 -- 100 % -- --   03/29/25 1946 (!) 140/93 -- -- 90 -- 99 % -- --   03/29/25 1939 (!) 179/106 -- -- -- -- -- -- --   03/29/25 1938 -- (!) 96.4  F (35.8  C) Temporal 108 24 99 % 1.702 m (5' 7\") (!) 218.6 kg (481 lb 14.8 oz)     Physical Exam  General: Alert, no acute distress; well appearing.   HEENT:  Moist mucous membranes.  Conjunctiva normal.   CV:  RRR, no m/r/g, skin warm and well perfused  Pulm:  CTAB, no wheezes/ronchi/rales.  No acute distress, breathing comfortably  GI:  Soft, nontender, nondistended.  No rebound or guarding.    MSK:  Moving all extremities.  Trace bilateral lower edema; lower extremity compartments soft.  No significant erythema or warmth; capillary refill < 4 seconds to the toes bilaterally. SILT throughout BLE.    Diagnostics     Lab Results   Labs Ordered and Resulted from Time of ED Arrival to Time of ED Departure - No data to display    Imaging   US Lower Extremity Venous Duplex Bilateral   Final Result   IMPRESSION:   1.  No deep venous " thrombosis in the bilateral lower extremities.          EKG   None    Independent Interpretation   None    ED Course      Medications Administered   Medications - No data to display    Procedures   Procedures     Discussion of Management   None    ED Course        Additional Documentation  None    Medical Decision Making / Diagnosis     CMS Diagnoses: None    MIPS       None    MDM   Olga Sheehan is a 35 year old adult with history of obesity presenting to the emergency department for evaluation of bilateral lower leg swelling and pain, worse on the left.  See above for the details in HPI and exam.  She has been treated twice for presumed cellulitis at the end of January.  She was seen urgent care today due to 2 days of symptoms with swelling and concerns for possible infection.  Lab studies from urgent care reviewed showing mildly elevated D-dimer, normal white count.  Patient denies any fever.  On exam, patient has no significant erythema or warmth suggestive of cellulitis.  Lower leg compartments are soft and not consistent with compartment syndrome and she has brisk capillary refill to her toes.  No concern for acute ischemic limb or arterial insufficiency.  Given elevated dimer, we did obtain bilateral lower extremity ultrasound which is what she negative for DVT.  Overall I am suspicious for lymphedema.  She otherwise has no other clinical signs or symptoms suggestive of CHF.  She is safe to discharge home with recommendations for leg elevation and use of compression stockings.  I do not feel diuretics are indicated at this time.  We will refer to vascular medicine due to new diagnosis of lymphedema.  She is comfortable with outpatient follow-up.  Discussed return precautions for the emergency department.  All questions were answered by discharge.    Disposition   The patient was discharged.     Diagnosis     ICD-10-CM    1. Lymphedema  I89.0 Vascular Medicine Referral      2. Pain in both lower legs  M79.661  Vascular Medicine Referral    M79.662            Discharge Medications   New Prescriptions    No medications on file         MD Alayna Bernard, Gilberto Stack MD  03/29/25 2100

## 2025-03-30 NOTE — ED TRIAGE NOTES
Pt reports legs started swelling 1 month ago, pt was told it was cellulitis and was started on antibiotics. Pt seen in UC today and sent to the ER with elevated D-dimer. Pt reports swelling worse in the left and more painful.

## 2025-03-31 ENCOUNTER — TELEPHONE (OUTPATIENT)
Dept: VASCULAR SURGERY | Facility: CLINIC | Age: 35
End: 2025-03-31
Payer: COMMERCIAL

## 2025-03-31 NOTE — TELEPHONE ENCOUNTER
Referral received via [facility] on 3/29/26.    Referred by Gilberto Catalan MD for Lymphedema    Previous imaging completed (pertinent to referral):  - US lower venous to r/o DVT (negative)    Routing to scheduling to coordinate the following:  No imaging  NEW VASCULAR PATIENT consult with Vascular Medicine  Please schedule this at next available    Appt note:  New lymphedema      Donna Anand RN  [facility]  P: [phone]  F: [phone]

## 2025-04-03 NOTE — TELEPHONE ENCOUNTER
.LM for patient to call us back to schedule:    No imaging  NEW VASCULAR PATIENT consult with Vascular Medicine  Please schedule this at next available     Appt note:  New lymphedema    This is our 2nd and final attempt to schedule this appointment.

## (undated) DEVICE — GLOVE PROTEXIS W/NEU-THERA 7.5  2D73TE75

## (undated) DEVICE — BAG CLEAR TRASH 1.3M 39X33" P4040C

## (undated) DEVICE — PACK MINOR CUSTOM RIDGES SBA32RMRMA

## (undated) DEVICE — ENDO CLOSING KIT XLG CTXL

## (undated) DEVICE — PANTIES MESH LG/XLG 2PK 706M2

## (undated) DEVICE — ESU CORD MONOPOLAR 10'  E0510

## (undated) DEVICE — SYR BULB IRRIG 50ML LATEX FREE 0035280

## (undated) DEVICE — BLADE CLIPPER 3M 9670

## (undated) DEVICE — SUCTION CANISTER MEDIVAC LINER 3000ML W/LID 65651-530

## (undated) DEVICE — ESU GROUND PAD ADULT W/CORD E7507

## (undated) DEVICE — SOL NACL 0.9% IRRIG 3000ML BAG 2B7477

## (undated) DEVICE — GLOVE PROTEXIS BLUE W/NEU-THERA 6.5  2D73EB65

## (undated) DEVICE — ESU PENCIL W/HOLSTER E2350H

## (undated) DEVICE — PREP POVIDONE IODINE SOLUTION 10% 4OZ

## (undated) DEVICE — SUCTION IRR STRYKERFLOW II W/TIP 250-070-520

## (undated) DEVICE — SOL NACL 0.9% IRRIG 1000ML BOTTLE 2F7124

## (undated) DEVICE — SU VICRYL 3-0 SH 27" J316H

## (undated) DEVICE — GLOVE PROTEXIS W/NEU-THERA 6.5  2D73TE65

## (undated) DEVICE — PACK MINOR SBA15MIFSE

## (undated) DEVICE — ENDO POUCH UNIV RETRIEVAL SYSTEM INZII 10MM CD001

## (undated) DEVICE — DRAPE GYN/UROLOGY FLUID POUCH TUR 29455

## (undated) DEVICE — LINEN TOWEL PACK X10 5473

## (undated) DEVICE — ESU ELEC BLADE 2.75" COATED/INSULATED E1455

## (undated) DEVICE — DRAPE LEGGINGS 8421

## (undated) DEVICE — TUBING SUCTION 12"X1/4" N612

## (undated) DEVICE — NDL 22GA 1.5"

## (undated) DEVICE — SUCTION TIP YANKAUER W/O VENT K86

## (undated) DEVICE — NDL INSUFFLATION 13GA 150MM C2202

## (undated) DEVICE — ENDO TROCAR FIRST ENTRY KII FIOS Z-THRD 05X100MM CTF03

## (undated) DEVICE — SYR 30ML LL W/O NDL 302832

## (undated) DEVICE — LINEN HALF SHEET 5512

## (undated) DEVICE — LINEN POUCH DBL 5427

## (undated) DEVICE — GLOVE PROTEXIS BLUE W/NEU-THERA 7.5  2D73EB75

## (undated) DEVICE — SYR 10ML FINGER CONTROL W/O NDL 309695

## (undated) DEVICE — Device

## (undated) DEVICE — SU VICRYL 2-0 UR-6 27" J602H

## (undated) DEVICE — CLIP APPLIER ENDO 5MM M/L LIGAMAX EL5ML

## (undated) DEVICE — PREP SKIN SCRUB TRAY 4461A

## (undated) DEVICE — LINEN FULL SHEET 5511

## (undated) DEVICE — DRAPE LAP PEDS DISP 29492

## (undated) DEVICE — ENDO TROCAR FIRST ENTRY KII FIOS Z-THRD 12X150MM CTF71

## (undated) DEVICE — GLOVE PROTEXIS MICRO 6.5  2D73PM65

## (undated) DEVICE — SOL NACL 0.9% IRRIG 1000ML BOTTLE 07138-09

## (undated) DEVICE — GOWN IMPERVIOUS ZONED LG

## (undated) DEVICE — DRSG KERLIX FLUFFS X5

## (undated) DEVICE — SU VICRYL 0 UR-6 27" J603H

## (undated) DEVICE — DRAPE MINOR PROCEDURE LAP 29496

## (undated) DEVICE — LINEN TOWEL PACK X5 5464

## (undated) DEVICE — SU VICRYL 4-0 PS-2 18" UND J496H

## (undated) DEVICE — SPONGE BALL KERLIX ROUND XL W/O STRING LATEX 4935

## (undated) DEVICE — ENDO TROCAR SLEEVE KII Z-THREADED 05X100MM CTS02

## (undated) DEVICE — SU SILK 0 24" TIE SA76G

## (undated) RX ORDER — FENTANYL CITRATE 50 UG/ML
INJECTION, SOLUTION INTRAMUSCULAR; INTRAVENOUS
Status: DISPENSED
Start: 2018-11-21

## (undated) RX ORDER — LIDOCAINE HYDROCHLORIDE 20 MG/ML
INJECTION, SOLUTION EPIDURAL; INFILTRATION; INTRACAUDAL; PERINEURAL
Status: DISPENSED
Start: 2018-11-21

## (undated) RX ORDER — FENTANYL CITRATE 50 UG/ML
INJECTION, SOLUTION INTRAMUSCULAR; INTRAVENOUS
Status: DISPENSED
Start: 2019-10-10

## (undated) RX ORDER — BUPIVACAINE HYDROCHLORIDE 2.5 MG/ML
INJECTION, SOLUTION EPIDURAL; INFILTRATION; INTRACAUDAL
Status: DISPENSED
Start: 2019-03-28

## (undated) RX ORDER — DEXAMETHASONE SODIUM PHOSPHATE 4 MG/ML
INJECTION, SOLUTION INTRA-ARTICULAR; INTRALESIONAL; INTRAMUSCULAR; INTRAVENOUS; SOFT TISSUE
Status: DISPENSED
Start: 2018-11-21

## (undated) RX ORDER — ONDANSETRON 2 MG/ML
INJECTION INTRAMUSCULAR; INTRAVENOUS
Status: DISPENSED
Start: 2019-10-10

## (undated) RX ORDER — LIDOCAINE HYDROCHLORIDE 10 MG/ML
INJECTION, SOLUTION EPIDURAL; INFILTRATION; INTRACAUDAL; PERINEURAL
Status: DISPENSED
Start: 2018-06-28

## (undated) RX ORDER — BUPIVACAINE HYDROCHLORIDE 5 MG/ML
INJECTION, SOLUTION EPIDURAL; INTRACAUDAL
Status: DISPENSED
Start: 2019-10-10

## (undated) RX ORDER — FENTANYL CITRATE 50 UG/ML
INJECTION, SOLUTION INTRAMUSCULAR; INTRAVENOUS
Status: DISPENSED
Start: 2019-03-28

## (undated) RX ORDER — GLYCOPYRROLATE 0.2 MG/ML
INJECTION INTRAMUSCULAR; INTRAVENOUS
Status: DISPENSED
Start: 2018-06-28

## (undated) RX ORDER — DEXAMETHASONE SODIUM PHOSPHATE 4 MG/ML
INJECTION, SOLUTION INTRA-ARTICULAR; INTRALESIONAL; INTRAMUSCULAR; INTRAVENOUS; SOFT TISSUE
Status: DISPENSED
Start: 2018-06-28

## (undated) RX ORDER — GLYCOPYRROLATE 0.2 MG/ML
INJECTION INTRAMUSCULAR; INTRAVENOUS
Status: DISPENSED
Start: 2019-03-28

## (undated) RX ORDER — OXYCODONE HYDROCHLORIDE 5 MG/1
TABLET ORAL
Status: DISPENSED
Start: 2019-10-10

## (undated) RX ORDER — HYDROMORPHONE HYDROCHLORIDE 1 MG/ML
INJECTION, SOLUTION INTRAMUSCULAR; INTRAVENOUS; SUBCUTANEOUS
Status: DISPENSED
Start: 2019-03-28

## (undated) RX ORDER — FENTANYL CITRATE 50 UG/ML
INJECTION, SOLUTION INTRAMUSCULAR; INTRAVENOUS
Status: DISPENSED
Start: 2018-06-28

## (undated) RX ORDER — HYDROMORPHONE HYDROCHLORIDE 1 MG/ML
INJECTION, SOLUTION INTRAMUSCULAR; INTRAVENOUS; SUBCUTANEOUS
Status: DISPENSED
Start: 2018-06-28

## (undated) RX ORDER — ACETAMINOPHEN 325 MG/1
TABLET ORAL
Status: DISPENSED
Start: 2019-03-28

## (undated) RX ORDER — DEXAMETHASONE SODIUM PHOSPHATE 4 MG/ML
INJECTION, SOLUTION INTRA-ARTICULAR; INTRALESIONAL; INTRAMUSCULAR; INTRAVENOUS; SOFT TISSUE
Status: DISPENSED
Start: 2019-03-28

## (undated) RX ORDER — ONDANSETRON 2 MG/ML
INJECTION INTRAMUSCULAR; INTRAVENOUS
Status: DISPENSED
Start: 2018-11-21

## (undated) RX ORDER — HYDROMORPHONE HYDROCHLORIDE 1 MG/ML
INJECTION, SOLUTION INTRAMUSCULAR; INTRAVENOUS; SUBCUTANEOUS
Status: DISPENSED
Start: 2019-10-10

## (undated) RX ORDER — LIDOCAINE HYDROCHLORIDE 10 MG/ML
INJECTION, SOLUTION EPIDURAL; INFILTRATION; INTRACAUDAL; PERINEURAL
Status: DISPENSED
Start: 2019-03-28

## (undated) RX ORDER — OXYCODONE HYDROCHLORIDE 5 MG/1
TABLET ORAL
Status: DISPENSED
Start: 2019-03-28

## (undated) RX ORDER — ONDANSETRON 2 MG/ML
INJECTION INTRAMUSCULAR; INTRAVENOUS
Status: DISPENSED
Start: 2018-06-28

## (undated) RX ORDER — KETOROLAC TROMETHAMINE 30 MG/ML
INJECTION, SOLUTION INTRAMUSCULAR; INTRAVENOUS
Status: DISPENSED
Start: 2019-10-10

## (undated) RX ORDER — OXYCODONE HYDROCHLORIDE 5 MG/1
TABLET ORAL
Status: DISPENSED
Start: 2018-06-28

## (undated) RX ORDER — GLYCOPYRROLATE 0.2 MG/ML
INJECTION INTRAMUSCULAR; INTRAVENOUS
Status: DISPENSED
Start: 2019-10-10

## (undated) RX ORDER — NEOSTIGMINE METHYLSULFATE 1 MG/ML
VIAL (ML) INJECTION
Status: DISPENSED
Start: 2019-10-10

## (undated) RX ORDER — BUPIVACAINE HYDROCHLORIDE 5 MG/ML
INJECTION, SOLUTION EPIDURAL; INTRACAUDAL
Status: DISPENSED
Start: 2018-06-28

## (undated) RX ORDER — PROPOFOL 10 MG/ML
INJECTION, EMULSION INTRAVENOUS
Status: DISPENSED
Start: 2019-03-28

## (undated) RX ORDER — CELECOXIB 200 MG/1
CAPSULE ORAL
Status: DISPENSED
Start: 2018-11-21

## (undated) RX ORDER — DEXAMETHASONE SODIUM PHOSPHATE 4 MG/ML
INJECTION, SOLUTION INTRA-ARTICULAR; INTRALESIONAL; INTRAMUSCULAR; INTRAVENOUS; SOFT TISSUE
Status: DISPENSED
Start: 2019-10-10

## (undated) RX ORDER — PROPOFOL 10 MG/ML
INJECTION, EMULSION INTRAVENOUS
Status: DISPENSED
Start: 2018-06-28

## (undated) RX ORDER — ACETAMINOPHEN 325 MG/1
TABLET ORAL
Status: DISPENSED
Start: 2018-06-28

## (undated) RX ORDER — NEOSTIGMINE METHYLSULFATE 1 MG/ML
VIAL (ML) INJECTION
Status: DISPENSED
Start: 2019-03-28

## (undated) RX ORDER — ACETAMINOPHEN 325 MG/1
TABLET ORAL
Status: DISPENSED
Start: 2018-11-21

## (undated) RX ORDER — ONDANSETRON 2 MG/ML
INJECTION INTRAMUSCULAR; INTRAVENOUS
Status: DISPENSED
Start: 2019-03-28

## (undated) RX ORDER — KETOROLAC TROMETHAMINE 30 MG/ML
INJECTION, SOLUTION INTRAMUSCULAR; INTRAVENOUS
Status: DISPENSED
Start: 2019-03-28

## (undated) RX ORDER — PROPOFOL 10 MG/ML
INJECTION, EMULSION INTRAVENOUS
Status: DISPENSED
Start: 2019-10-10

## (undated) RX ORDER — LIDOCAINE HYDROCHLORIDE 10 MG/ML
INJECTION, SOLUTION EPIDURAL; INFILTRATION; INTRACAUDAL; PERINEURAL
Status: DISPENSED
Start: 2019-10-10

## (undated) RX ORDER — PROPOFOL 10 MG/ML
INJECTION, EMULSION INTRAVENOUS
Status: DISPENSED
Start: 2018-11-21